# Patient Record
Sex: FEMALE | Race: OTHER | Employment: UNEMPLOYED | URBAN - METROPOLITAN AREA
[De-identification: names, ages, dates, MRNs, and addresses within clinical notes are randomized per-mention and may not be internally consistent; named-entity substitution may affect disease eponyms.]

---

## 2017-05-15 ENCOUNTER — OFFICE VISIT (OUTPATIENT)
Dept: ENDOCRINOLOGY | Age: 37
End: 2017-05-15

## 2017-05-15 VITALS
HEIGHT: 63 IN | DIASTOLIC BLOOD PRESSURE: 71 MMHG | BODY MASS INDEX: 22.39 KG/M2 | SYSTOLIC BLOOD PRESSURE: 124 MMHG | HEART RATE: 82 BPM | WEIGHT: 126.4 LBS

## 2017-05-15 DIAGNOSIS — E04.2 MULTINODULAR GOITER: ICD-10-CM

## 2017-05-15 DIAGNOSIS — E05.90 HYPERTHYROIDISM: Primary | ICD-10-CM

## 2017-05-15 RX ORDER — METOPROLOL SUCCINATE 25 MG/1
TABLET, EXTENDED RELEASE ORAL
Refills: 5 | COMMUNITY
Start: 2017-04-13 | End: 2017-08-24 | Stop reason: ALTCHOICE

## 2017-05-15 NOTE — MR AVS SNAPSHOT
Visit Information Date & Time Provider Department Dept. Phone Encounter #  
 5/15/2017  1:30 PM Mariluz Walden MD [facility] [phone] Upcoming Health Maintenance Date Due DTaP/Tdap/Td series (1 - Tdap) 8/8/2000 PAP AKA CERVICAL CYTOLOGY 8/8/2000 INFLUENZA AGE 9 TO ADULT 8/1/2017 Allergies as of 5/15/2017  Review Complete On: 5/15/2017 By: Mariluz Walden MD  
 No Known Allergies Current Immunizations  Never Reviewed No immunizations on file. Not reviewed this visit You Were Diagnosed With   
  
 Codes Comments Hyperthyroidism    -  Primary ICD-10-CM: E05.90 ICD-9-CM: 242.90 Multinodular goiter     ICD-10-CM: E04.2 ICD-9-CM: 661. 1 Vitals BP Pulse Height(growth percentile) Weight(growth percentile) BMI Smoking Status 124/71 82 5' 3\" (1.6 m) 126 lb 6.4 oz (57.3 kg) 22.39 kg/m2 Never Smoker Vitals History BMI and BSA Data Body Mass Index Body Surface Area  
 22.39 kg/m 2 1.6 m 2 Preferred Pharmacy Pharmacy Name Phone [facility] [address] [place] [address]  [facility] [place] [phone] Your Updated Medication List  
  
   
This list is accurate as of: 5/15/17  2:13 PM.  Always use your most recent med list.  
  
  
  
  
 metoprolol succinate 25 mg XL tablet Commonly known as:  TOPROL-XL  
TM 1 TA DI We Performed the Following MI COLLECTION VENOUS BLOOD,VENIPUNCTURE [id number] CPT(R)] MI HANDLG&/OR CONVEY OF SPEC FOR TR OFFICE TO LAB [65574 CPT(R)]   
 T3 TOTAL [79014 CPT(R)] T4, FREE [id number] CPT(R)] THYROID ANTIBODY PANEL [51916 CPT(R)] TSH 3RD GENERATION [19989 CPT(R)] TSH RECEPTOR AB [34261 CPT(R)] To-Do List   
 05/15/2017 Imaging:  US GUIDE FINE NDL ASP W IMAGE Patient Instructions Biopsia tiroidea con aguja gillian: Antes del procedimiento - [ Jessica Elaine Thyroid Biopsy: Before Your Procedure ] Qué es buck biopsia tiroidea por punción? Tee buck biopsia tiroidea, el médico utiliza buck aguja delgada para extraer buck pequeña muestra de tejido de la glándula tiroidea. Es posible que le estén haciendo la biopsia para determinar la causa de un bulto o crecimiento en la glándula tiroidea. La biopsia causa muy poco dolor. Rebeca glass médico puede necesitar introducirle la aguja en la glándula tiroidea más de [place]. [place] se hace para asegurarse de obtener suficiente líquido y tejido para la prueba. El médico luego examina la [place] de tejido bajo un microscopio para detectar cáncer, infección u otros problemas tiroideos. La biopsia se hace en un hospital, buck clínica o el consultorio de glass médico. Tee la prueba, usted se acostará boca arriba con buck almohada bajo los hombros. Tendrá la sosa inclinada hacia atrás y el sean extendido. Esta posición empuja la glándula tiroidea hacia tez. [place] facilita hacer la biopsia. Es posible que se le administre un medicamento para ayudarle a relajarse. Glass médico puede usar buck ecografía para guiar la colocación de la aguja. Es importante permanecer muy quieto tee la biopsia. No tosa, hable ni trague cuando la aguja esté en glass lugar. En algunos casos, la cirugía tiroidea puede ser necesaria si buck biopsia con aguja no proporciona un resultado frank. [place] se haría en un momento diferente. En esta cirugía, el médico ezequiel [place] de tejido a través de un heather (incisión) en la piel. La atención de seguimiento es buck parte clave de glass tratamiento y seguridad. Asegúrese de hacer y acudir a todas las citas, y llame a glass médico si está teniendo problemas. También es buck buena idea saber los resultados de jayleen exámenes y mantener buck lista de los medicamentos que ezequiel. Emil Poor antes del procedimiento? Los procedimientos pueden ser estresantes.  Esta información le ayudará a entender qué puede esperar. Y le ayudará a prepararse de manera corral para glass procedimiento. No necesita hacer nada antes de glass biopsia. Estará despierto tee la biopsia. Cómo prepararse para el procedimiento · Entienda exactamente qué procedimiento está planificado, junto con los [place], los beneficios y las otras [address] · Infórmeles a jayleen médicos sobre [employer], las vitaminas, los suplementos y los jesus herbarios que ezequiel. Algunos de [employer] aumentar el riesgo de sangrado o interactuar con la anestesia. · Si ezequiel medicamentos que previenen la formación de coágulos de [place], kyle warfarina (Coumadin), clopidogrel (Plavix) o aspirina, asegúrese de hablar con glass médico. Le dirá si debe dejar de ted estos medicamentos antes del procedimiento. Asegúrese de entender exactamente lo que glass médico quiere que shyanne. · Glass médico le dirá qué medicamentos ted o dejar de ted antes del procedimiento. Es posible que deba dejar de ted ciertos medicamentos buck semana o más antes del procedimiento, así que hable con glass médico tan pronto kyle pueda. · Informe a glass médico si tiene instrucciones médicas por anticipado. Estas pueden incluir un testamento vital y un poder legal permanente para la atención médica. Lleve consigo buck copia cuando vaya al hospital. Si no las tiene, sería conveniente prepararlas. Jae Chang [place], [place] médico y seres queridos sabrán jayleen deseos sobre la [facility]. Los médicos recomiendan que todas las personas preparen estos documentos antes de cualquier tipo de [place] o procedimiento. Javier Friend el día del procedimiento? · Siga en forma precisa las instrucciones sobre cuándo debe dejar de comer y beber. Si no lo hace, el procedimiento podría ser cancelado. Si glass médico le dijo que tome jayleen medicamentos el día del procedimiento, tómelos con un solo sorbo de agua. · Báñese o dúchese antes de registrarse para el procedimiento.  No use lociones, perfumes, desodorantes ni esmalte de uñas. · [place] todas las joyas y los \"piercings\". Si lleva lentes de contacto, quíteselos también. En el hospital o centro quirúrgico 
· Lleve un documento de identidad con foto. · El procedimiento durará entre 5 y 8 minutos. El regreso a glsas hogar · Es posible que tenga que pasar la noche en el hospital. 
· Asegúrese de que alguien lo lleve a glass hogar. La anestesia y los analgésicos (medicamentos para el dolor) hacen que no sea seguro que ted Antonino Cerda. · Recibirá instrucciones más específicas acerca de la recuperación del procedimiento. Cubrirán [employer] alimentación, el cuidado de las heridas, la atención de seguimiento, el manejo de vehículos y la vuelta a glass rutina normal. 

Cuándo debe llamar a glass médico? 
· Tiene preguntas o inquietudes. · No entiende cómo debe prepararse para el procedimiento. · Se enferma antes del procedimiento (por ejemplo, tiene fiebre, un resfriado o gripe). · Necesita reprogramar el procedimiento o cambió de opinión acerca de hacerse el procedimiento. Dónde puede encontrar más información en inglés? Alexi landa [email or web address]-[email or web address] Escriba H979 en la búsqueda para aprender más acerca de \"Biopsia tiroidea con aguja gillian: Antes del procedimiento - [ Fine-Needle Thyroid Biopsy: Before Your Procedure ]. \" 
Revisado: 28 julio, 2016 Versión del contenido: 11.2 © 1009-8947 [employer], [employer] Alert. Las instrucciones de cuidado fueron adaptadas bajo licencia por Good Help Connections (which disclaims liability or warranty for this information). Si usted tiene [place] afección médica o sobre estas instrucciones, siempre pregunte a glass profesional de jdaen. [facility] niega toda garantía o responsabilidad por glass uso de esta información. Hipertiroidismo: Instrucciones de cuidado - [ Hyperthyroidism: Care Instructions ] Instrucciones de cuidado El hipertiroidismo ocurre cuando la glándula tiroides produce demasiada hormona tiroidea. Paton acelera glass metabolismo, o sea, la [employer] glass cuerpo [place] la energía. Esta afección puede hacer que usted esté muy [place], baje de [place], y que tenga problemas de [facility], [facility] ojos y [facility] frecuencia cardíaca rápida. También puede causar bocio. El bocio es el agrandamiento de la glándula tiroides que puede verse en la parte delantera del sean. La enfermedad de [place] suele causar hipertiroidismo. En la enfermedad de [place], el sistema de defensa (inmunitario) del cuerpo ataca la glándula tiroides. Glass médico podría recetarle un medicamento betabloqueante para hacer glass pulso más lento y calmarle. Rebeca esto no es un tratamiento para el hipertiroidismo. Se le administra para la frecuencia cardíaca rápida. Glass médico podría también darle un medicamento antitiroideo. Lala medicamento controla el exceso de la hormona tiroidea. En algunos casos, los médicos recomiendan yodo radioactivo o [place] para extraer la tiroides. Después de cualquiera de esos tratamientos, es posible que necesite ted medicamentos para reemplazar glass hormona tiroidea tee el cori de glass davey. La atención de seguimiento es buck parte clave de glass tratamiento y seguridad. Asegúrese de hacer y acudir a todas las citas, y llame a glass médico si está teniendo problemas. También es buck buena idea saber los resultados de los exámenes y mantener buck lista de los medicamentos que ezequiel. Cómo puede cuidarse en el hogar? · Ocotillo jayleen medicamentos exactamente kyle le fueron recetados. Necesita ted [employer] tiroideo a la misma hora todos los días. Llame a glass médico si danny estar teniendo problemas con glass medicamento. · La enfermedad de [place] puede causar dolor en los ojos. Use lágrimas artificiales, gotas para los ojos y anteojos de sol para proteger jayleen ojos de la resequedad, el viento y el sol.  Por la noche, levante glass sosa con almohadas para prevenir que jayleen ojos se hinchen. En algunos casos, tapar jayleen párpados con cinta adhesiva tee la noche evitará que jayleen ojos estén resecos a la mañana. · Asegúrese de consumir suficiente calcio. Los alimentos ricos en calcio incluyen la [place], el yogur, el queso y los vegetales de hojas de color august oscuro. · Si usted necesita subir de [place], pregúntele a glass médico sobre dietas especiales. · No coma algas. Low [place] son ricas en yodo, lo que puede empeorar el hipertiroidismo. Las algas se usan comúnmente en el sushi y otras comidas [place]. Usted puede usar sal yodada y comer san y [address]. Trate de seguir buck dieta balanceada. · No use cafeína ni otros estimulantes. Éstos pueden empeorar jayleen síntomas, kyle los latidos del corazón rápidos, el nerviosismo y los problemas para concentrarse. · No fume. Fumar puede empeorar glass afección y podría causar problemas más graves en los ojos. Si necesita ayuda para dejar de fumar, hable con glass médico [employer] y medicamentos para dejar de fumar. Éstos pueden aumentar jayleen probabilidades de dejar el hábito para siempre. · Reduzca el estrés. Aprenda a usar la biorretroalimentación, la imaginería guiada, la meditación u otros métodos de relajación. · Use cremas o pomadas para la piel irritada. Pregúntele a glass médico qué tipo debe usar. · Infórmele a todos jayleen médicos acerca de glass afección médica. Necesitan saberlo porque algunos medicamentos contienen yodo. Cuándo debe pedir ayuda? Llame a glass médico ahora mismo o busque atención médica inmediata si: · Tiene síntomas de un nivel de tiroides [place] alto y repentino (tormenta tiroidea o crisis tirotóxica). Éstos incluyen: 
[place], vómito y Radha Jatin. ¨ Sudoración excesiva. ¨ Inquietud y confusión extremas. ¨ Fiebre juan. ¨ Pulso (latidos del corazón) acelerado. · Tiene cambios repentinos en la visión o dolor en los ojos.  
· Tiene fiebre o mucho dolor de garganta, y ezequiel medicamentos antitiroideos, kyle PTU o metimazol. Preste especial atención a los cambios en glass jaden y asegúrese de comunicarse con glass médico si: · Tiene dolor de garganta o problemas para tragar. · Tiene los ojos hinchados, enrojecidos o con comezón, o jayleen otros síntomas en los ojos empeoran o tiene nuevos problemas en la visión. · Tiene señales de un nivel tiroideo bajo (hipotiroidismo). Es posible que se sienta muy cansado, confuso o débil. Dónde puede encontrar más información en inglés? Eusebio Rife a [email or web address]-[email or web address] Yarely Fabián X356 en la búsqueda para aprender más acerca de \"Hipertiroidismo: Instrucciones de cuidado - [ Hyperthyroidism: Care Instructions ]. \" 
Revisado: 28 julio, 2016 Versión del contenido: 11.2 © 4556-4373 Healthwise, Incorporated. Las instrucciones de cuidado fueron adaptadas bajo licencia por Good Help Connections (which disclaims liability or warranty for this information). Si usted tiene [place] afección médica o sobre estas instrucciones, siempre pregunte a glass profesional de jaden. Healthwise, Incorporated niega toda garantía o responsabilidad por glass uso de esta información. Introducing [facility]! Yesenia Mendozaer introduces [facility] patient portal. Now you can access parts of your medical record, email your doctor's office, and request medication refills online. 1. In your internet browser, go to [email or web address] 2. Click on the First Time User? Click Here link in the Sign In box. You will see the New Member Sign Up page. 3. Enter your [facility] Access Code exactly as it appears below. You will not need to use this code after youve completed the sign-up process. If you do not sign up before the expiration date, you must request a new code. · [facility] Access Code: [email or web address] Expires: 8/13/2017  2:13 PM 
 
4.  Enter the last four digits of your Social Security Number (xxxx) and Date of Birth (mm/dd/yyyy) as indicated and click Submit. You will be taken to the next sign-up page. 5. Create a [facility] ID. This will be your [facility] login ID and cannot be changed, so think of one that is secure and easy to remember. 6. Create a [facility] password. You can change your password at any time. 7. Enter your Password Reset Question and Answer. This can be used at a later time if you forget your password. 8. Enter your e-mail address. You will receive e-mail notification when new information is available in [address] 9. Click Sign Up. You can now view and download portions of your medical record. 10. Click the Download Summary menu link to download a portable copy of your medical information. If you have questions, please visit the Frequently Asked Questions section of the [facility] website. Remember, [facility] is NOT to be used for urgent needs. For medical emergencies, dial 911. Now available from your iPhone and Android! Please provide this summary of care documentation to your next provider. Your primary care clinician is listed as Ester Samayoa. If you have any questions after today's visit, please call [phone].

## 2017-05-15 NOTE — PATIENT INSTRUCTIONS
Biopsia tiroidea con aguja gillian: Antes del procedimiento - [ Fine-Needle Thyroid Biopsy: Before Your Procedure ]  ¿Qué es buck biopsia tiroidea por punción? Tee buck biopsia tiroidea, el médico utiliza buck aguja delgada para extraer buck pequeña muestra de tejido de la glándula tiroidea. Es posible que le estén haciendo la biopsia para determinar la causa de un bulto o crecimiento en la glándula tiroidea. La biopsia causa muy poco dolor. Rebeca glass médico puede necesitar introducirle la aguja en la glándula tiroidea más de [place]. [place] se hace para asegurarse de obtener suficiente líquido y tejido para la prueba. El médico luego examina la [place] de tejido bajo un microscopio para detectar cáncer, infección u otros problemas tiroideos. La biopsia se hace en un hospital, buck clínica o el consultorio de glass médico. Tee la prueba, usted se acostará boca arriba con buck almohada bajo los hombros. Tendrá la sosa inclinada hacia atrás y el sean extendido. Esta posición empuja la glándula tiroidea hacia tez. [place] facilita hacer la biopsia. Es posible que se le administre un medicamento para ayudarle a relajarse. Glass médico puede usar buck ecografía para guiar la colocación de la aguja. Es importante permanecer muy quieto tee la biopsia. No tosa, hable ni trague cuando la aguja esté en glass lugar. En algunos casos, la cirugía tiroidea puede ser necesaria si buck biopsia con aguja no proporciona un resultado frank. [place] se haría en un momento diferente. En esta cirugía, el médico ezequiel [place] de tejido a través de un heather (incisión) en la piel. La atención de seguimiento es buck parte clave de glass tratamiento y seguridad. Asegúrese de hacer y acudir a todas las citas, y llame a glass médico si está teniendo problemas. También es buck buena idea saber los resultados de jayleen exámenes y mantener buck lista de los medicamentos que ezequiel. ¿Qué ocurre antes del procedimiento?   Los procedimientos pueden ser estresantes. Esta información le ayudará a entender qué puede esperar. Y le ayudará a prepararse de manera corral para glass procedimiento. No necesita hacer nada antes de glass biopsia. Estará despierto tee la biopsia. Cómo prepararse para el procedimiento  · [place] exactamente qué procedimiento está planificado, junto con los [place], los beneficios y las otras alternativas. · Infórmeles a jayleen médicos sobre [employer], las vitaminas, los suplementos y los jesus herbarios que ezequiel. Algunos de [employer] aumentar el riesgo de sangrado o interactuar con la anestesia. · Si ezequiel medicamentos que previenen la formación de coágulos de [place], kyle warfarina (Coumadin), clopidogrel (Plavix) o aspirina, asegúrese de hablar con glass médico. Le dirá si debe dejar de ted estos medicamentos antes del procedimiento. Asegúrese de entender exactamente lo que glass médico quiere que shyanne. · Glass médico le dirá qué medicamentos ted o dejar de ted antes del procedimiento. Es posible que deba dejar de ted ciertos medicamentos buck semana o más antes del procedimiento, así que hable con glass médico tan pronto kyle pueda. · Informe a glass médico si tiene instrucciones médicas por anticipado. Estas pueden incluir un testamento vital y un poder legal permanente para la atención médica. Lleve consigo buck copia cuando vaya al hospital. Si no las tiene, sería conveniente prepararlas. Shama Choi [place], [place] médico y seres queridos sabrán jayleen deseos sobre la [facility]. Los médicos recomiendan que todas las personas preparen estos documentos antes de cualquier tipo de [place] o procedimiento. ¿Qué ocurre el día del procedimiento? · Siga en forma precisa las instrucciones sobre cuándo debe dejar de comer y beber. Si no lo hace, el procedimiento podría ser cancelado. Si glass médico le dijo que tome jayleen medicamentos el día del procedimiento, tómelos con un solo sorbo de agua.   · Báñese o dúchese antes de registrarse para el procedimiento. No use lociones, perfumes, desodorantes ni esmalte de uñas. · [place] todas las joyas y los \"piercings\". Si lleva lentes de contacto, quíteselos también. En el hospital o centro quirúrgico  · Lleve un documento de identidad con foto. · El procedimiento durará entre 5 y 8 minutos. El regreso a glass hogar  · Es posible que tenga que pasar la noche en el hospital.  · Asegúrese de que alguien lo lleve a glass hogar. La anestesia y los analgésicos (medicamentos para el dolor) hacen que no sea seguro que ted Antonino Cerda. · Recibirá instrucciones más específicas acerca de la recuperación del procedimiento. [place] [employer] alimentación, el cuidado de las heridas, la atención de seguimiento, el manejo de vehículos y la vuelta a glass rutina normal.  ¿Cuándo debe llamar a glass médico?  · Tiene preguntas o inquietudes. · No entiende cómo debe prepararse para el procedimiento. · Se enferma antes del procedimiento (por ejemplo, tiene fiebre, un resfriado o gripe). · Necesita reprogramar el procedimiento o cambió de opinión acerca de hacerse el procedimiento. ¿Dónde puede encontrar más información en inglés? Joe Duncan a [email or web address] Escriba X790 en la búsqueda para aprender más acerca de \"Biopsia tiroidea con aguja gillian: Antes del procedimiento - [ Fine-Needle Thyroid Biopsy: Before Your Procedure ]. \"  Revisado: 28 julio, 2016  Versión del contenido: 11.2  © 0518-1520 [employer]. Las instrucciones de cuidado fueron adaptadas bajo licencia por [employer] (which disclaims liability or warranty for this information). Si usted tiene [place] afección médica o sobre estas instrucciones, siempre pregunte a glass profesional de jaden. [facility] niega toda garantía o responsabilidad por glass uso de esta información.        Hipertiroidismo: Instrucciones de cuidado - [ Hyperthyroidism: Care Instructions ]  Instrucciones de [address] hipertiroidismo ocurre cuando la glándula tiroides produce demasiada hormona tiroidea. [place] acelera glass metabolismo, o sea, la [employer] glass cuerpo [place] la energía. Esta afección puede hacer que usted esté muy [place], baje de [place], y que tenga problemas de sueño, [employer] ojos y [place] frecuencia cardíaca rápida. También puede causar bocio. El bocio es el agrandamiento de la glándula tiroides que puede verse en la parte delantera del sean. La enfermedad de [place] suele causar hipertiroidismo. En la enfermedad de [place], el sistema de defensa (inmunitario) del cuerpo ataca la glándula tiroides. Glass médico podría recetarle un medicamento betabloqueante para hacer glass pulso más lento y calmarle. Rebeca esto no es un tratamiento para el hipertiroidismo. Se le administra para la frecuencia cardíaca rápida. Glass médico podría también darle un medicamento antitiroideo. Lala medicamento controla el exceso de la hormona tiroidea. En algunos casos, los médicos recomiendan yodo radioactivo o [place] para extraer la tiroides. Después de cualquiera de esos tratamientos, es posible que necesite ted medicamentos para reemplazar glass hormona tiroidea tee el cori de glass davey. La atención de seguimiento es buck parte clave de glass tratamiento y seguridad. Asegúrese de hacer y acudir a todas las citas, y llame a glass médico si está teniendo problemas. También es buck buena idea saber los resultados de los exámenes y mantener buck lista de los medicamentos que ezequiel. ¿Cómo puede cuidarse en el hogar? · [place] jayleen medicamentos exactamente kyle le fueron recetados. Necesita ted [employer] tiroideo a la misma hora todos los días. Llame a glass médico si danny estar teniendo problemas con glass medicamento. · La enfermedad de [place] puede causar dolor en los ojos. Use lágrimas artificiales, gotas para los ojos y anteojos de sol para proteger jayleen ojos de la resequedad, el viento y el sol.  Por la noche, levante glass sosa con almohadas para prevenir que jayleen ojos se hinchen. En algunos casos, tapar jayleen párpados con cinta adhesiva tee la noche evitará que jayleen ojos estén resecos a la mañana. · Asegúrese de consumir suficiente calcio. Los alimentos ricos en calcio incluyen la [place], el yogur, el queso y los vegetales de hojas de color august oscuro. · Si usted necesita subir de [place], pregúntele a glass médico sobre dietas especiales. · No coma algas. [place] son ricas en yodo, lo que puede empeorar el hipertiroidismo. Las algas se usan comúnmente en el sushi y otras comidas [place]. Usted puede usar sal yodada y comer pan y [place]. Trate de seguir buck dieta balanceada. · No use cafeína ni otros estimulantes. Éstos pueden empeorar jayleen síntomas, kyle los latidos del corazón rápidos, el nerviosismo y los problemas para concentrarse. · No fume. Fumar puede empeorar glass afección y podría causar problemas más graves en los ojos. Si necesita ayuda para dejar de fumar, hable con glass médico [employer] y medicamentos para dejar de fumar. Éstos pueden aumentar jayleen probabilidades de dejar el hábito para siempre. · Reduzca el estrés. Aprenda a usar la biorretroalimentación, la imaginería guiada, la meditación u otros métodos de relajación. · Use cremas o pomadas para la piel irritada. Pregúntele a glass médico qué tipo debe usar. · Infórmele a todos jayleen médicos acerca de glass afección médica. Necesitan saberlo porque algunos medicamentos contienen yodo. ¿Cuándo debe pedir ayuda? Llame a glass médico ahora mismo o busque atención médica inmediata si:  · Tiene síntomas de un nivel de tiroides [place] alto y repentino (tormenta tiroidea o crisis tirotóxica). Éstos incluyen:  [employer], vómito y [place]. ¨ Sudoración excesiva. ¨ Inquietud y confusión extremas. ¨ Fiebre juan. ¨ Pulso (latidos del corazón) acelerado. · Tiene cambios repentinos en la visión o dolor en los ojos.   · Tiene fiebre o mucho dolor de garganta, y ezequiel medicamentos antitiroideos, kyle PTU o metimazol. Preste especial atención a los cambios en glass jaden y asegúrese de comunicarse con glass médico si:  · Tiene dolor de garganta o problemas para tragar. · Tiene los ojos hinchados, enrojecidos o con comezón, o jayleen otros síntomas en los ojos empeoran o tiene nuevos problemas en la visión. · Tiene señales de un nivel tiroideo bajo (hipotiroidismo). Es posible que se sienta muy cansado, confuso o débil. ¿Dónde puede encontrar más información en inglés? Melita Sharma a [email or web address] Suzanna Farley V698 en la búsqueda para aprender más acerca de \"Hipertiroidismo: Instrucciones de cuidado - [ Hyperthyroidism: Care Instructions ]. \"  Revisado: 28 julio, 2016  Versión del contenido: 11.2  © 3999-0275 [employer]wise, Incorporated. Las instrucciones de cuidado fueron adaptadas bajo licencia por [employer] (which disclaims liability or warranty for this information). Si usted tiene [place] afección médica o sobre estas instrucciones, siempre pregunte a glass profesional de jaden. [employer]wise, Incorporated niega toda garantía o responsabilidad por glass uso de esta información.

## 2017-05-15 NOTE — PROGRESS NOTES
Chief Complaint   Patient presents with    Thyroid Problem     pcp and pharmacy verified   Records from PCP reviewed  History of Present Illness: Lindsey Fox is a 39 y.o. female, who I was asked to see in consult by Dr. Jennifer Alicea for evaluation of hyperthyroidism and MNG. Pt notes that in 2015 in [facility] she was diagnosed with \"a thyroid issue\" she had a biopsy but was never treated for her \"thyroid problems\". She was having issues of fatigue, tired all the time, low mood, GI upset and hair loss. \"I had blood tests and was told I had a thyroid issue\". She had an ultrasound and biopsy, but there was no evidence of cancer. She brought the thyroid FNA report from [facility], the Left thyroid nodule was read as \"comatible with nodular Lymphocytic Thyroidits\"  The Right thyroid was \"unsatisfactory sample\". She notes that she when she was initially diagnosed she was having some dysphagia, but that has resolved. She is still having the issues of hair loss, and fatigue, she notes that \"I feel dizzy and unbalanced\", she also notes palpitatons and \"my eyes are bothering me\". She notes the dizziness will come and go, she notes it occurs every couple of weeks and then will resolve and then reoccur. She also notes issues of HAs. Prior to 2015 she never had previous thyroid issues. She has cousins on her mother's side with thyroid issues. No personal history of cancer, she thinks that two people on her father's side had cancer, stomach and \"an unknown cancer\". Pt was also sent for Thyroid US in April 2017 at [facility], the report shows a right thyroid nodule 9x5x8 mm and Left hypoechoic nodule measuring 1.9x1. 1x1.7 cm with internal septation. Will request images of thyroid US from [facility].     Pt only speaks Korean, the history was taken with the help of Tony Gonzales #[id number]    Past Medical History:   Diagnosis Date    Hypertension     Palpitations     Thyroid nodule History reviewed. No pertinent surgical history. Current Outpatient Prescriptions   Medication Sig    metoprolol succinate (TOPROL-XL) 25 mg XL tablet TM 1 TA DI     No current facility-administered medications for this visit. No Known Allergies  Family History   Problem Relation Age of Onset    Kidney Disease Mother      MARCUS    Hypertension Father     Diabetes Father     Kidney Disease Maternal Grandmother     Hypertension Maternal Grandmother     Cancer Maternal Grandfather      sto,acj    Hypertension Maternal Grandfather     No Known Problems Paternal Grandmother     No Known Problems Paternal Grandfather     No Known Problems Sister     No Known Problems Brother     Thyroid Disease Cousin      x4    Thyroid Cancer Neg Hx      Social History     Social History    Marital status: SINGLE     Spouse name: N/A    Number of children: N/A    Years of education: N/A     Occupational History    Not on file. Social History Main Topics    Smoking status: Never Smoker    Smokeless tobacco: Never Used    Alcohol use No    Drug use: No    Sexual activity: Not on file     Other Topics Concern    Not on file     Social History Narrative    No narrative on file     Review of Systems:  - Constitutional Symptoms: no fevers, chills, weight loss  - Eyes: + blurry vision  - Cardiovascular: no chest pain or palpitations  - Respiratory: no cough or shortness of breath  - Gastrointestinal: no dysphagia or abdominal pain  - Musculoskeletal: + weakness  - Integumentary: no rashes  - Neurological: no numbness, tingling, or headaches  - Psychiatric: + depression  - Endocrine: no heat or cold intolerance, no polyuria or polydipsia    Physical Examination:  Blood pressure 124/71, pulse 82, height 5' 3\" (1.6 m), weight 126 lb 6.4 oz (57.3 kg).   - General: pleasant, no distress, good eye contact  - HEENT: no exopthalmos, no periorbital edema, no scleral/conjunctival injection, EOMI, no lid lag or stare  - Neck: supple, no thyromegaly, masses, lymph nodes, or carotid bruits, no supraclavicular or dorsocervical fat pads  - Cardiovascular: regular, normal rate, normal S1 and S2, no murmurs/rubs/gallops   - Respiratory: clear to auscultation bilaterally  - Gastrointestinal: soft, nontender, nondistended, no masses, no hepatosplenomegaly  - Musculoskeletal: no proximal muscle weakness in upper or lower extremities  - Integumentary: no acanthosis nigricans, no abdominal striae, no rashes, no edema  - Neurological: reflexes 2+ at biceps, no tremor  - Psychiatric: normal mood and affect    Data Reviewed:   - TSH 0.382 December 2016  - thyroid ultrasound report (April 2017)  Right nodule 9.4x5. 3x8.1mm  Left lower nodule 1.9x1. 1x1.7cm hypoechoic with internal septation. Assessment/Plan:   1. Hyperthyroidism    2. Multinodular goiter    1) Pt has symptoms comptible with hypothyroidism, but she had low TSH in December 2016 and is on daily Metoprolol. Will check TSH, FT4, TT3, Trab, and Thyroid Ab panel to evaluate her thyroid function. She has no exophthalmos, thyromegaly, or lid lag. The FNA from 2015 suggested \"Lymphocytic Thyroiditis\". Pt was told her platelet count has been high since she was diagnosed with thyroid issues. I recommended she follow up with her PCP for further evaluation of her blood counts. 2) Will order a thyroid FNA of the left dominate thyroid nodule and the right thyroid nodule    Pt voices understanding and agreement with the plan. RTC 3 months      Patient Instructions        Biopsia tiroidea con aguja gillian: Antes del procedimiento - [ Fine-Needle Thyroid Biopsy: Before Your Procedure ]  ¿Qué es buck biopsia tiroidea por punción? Keira buck biopsia tiroidea, el médico utiliza buck aguja delgada para extraer buck pequeña muestra de tejido de la glándula tiroidea.  Es posible que le estén haciendo la biopsia para determinar la causa de un bulto o crecimiento en la glándula tiroidea. La biopsia causa muy poco dolor. Rebeca glass médico puede necesitar introducirle la aguja en la glándula tiroidea más de [place]. Carencro se hace para asegurarse de obtener suficiente líquido y tejido para la prueba. El médico luego examina la Juanjo de tejido bajo un microscopio para detectar cáncer, infección u otros problemas tiroideos. La biopsia se hace en un hospital, buck clínica o el consultorio de glass médico. Tee la prueba, usted se acostará boca arriba con buck almohada bajo los hombros. Tendrá la sosa inclinada hacia atrás y el sean extendido. Esta posición empuja la glándula tiroidea hacia tez. Carencro facilita hacer la biopsia. Es posible que se le administre un medicamento para ayudarle a relajarse. Glsas médico puede usar buck ecografía para guiar la colocación de la aguja. Es importante permanecer muy quieto tee la biopsia. No tosa, hable ni trague cuando la aguja esté en glass lugar. En algunos casos, la cirugía tiroidea puede ser necesaria si buck biopsia con aguja no proporciona un resultado frank. Carencro se haría en un momento diferente. En esta cirugía, el médico ezequiel [place] de tejido a través de un heather (incisión) en la piel. La atención de seguimiento es buck parte clave de glass tratamiento y seguridad. Asegúrese de hacer y acudir a todas las citas, y llame a glass médico si está teniendo problemas. También es buck buena idea saber los resultados de jayleen exámenes y mantener buck lista de los medicamentos que ezequiel. ¿Qué ocurre antes del procedimiento? Los procedimientos pueden ser estresantes. Esta información le ayudará a entender qué puede esperar. Y le ayudará a prepararse de manera corral para glass procedimiento. No necesita hacer nada antes de galss biopsia. Estará despierto tee la biopsia. Cómo prepararse para el procedimiento  · [place] exactamente qué procedimiento está planificado, junto con los [place], los beneficios y las otras alternativas.   · Infórmeles a jayleen médicos sobre [employer], las vitaminas, los suplementos y los jesus herbarios que ezequiel. Algunos de [employer] aumentar el riesgo de sangrado o interactuar con la anestesia. · Si ezequiel medicamentos que previenen la formación de coágulos de [place], kyle warfarina (Coumadin), clopidogrel (Plavix) o aspirina, asegúrese de hablar con glass médico. Le dirá si debe dejar de ted estos medicamentos antes del procedimiento. Asegúrese de entender exactamente lo que glass médico quiere que shyanne. · Glass médico le dirá qué medicamentos ted o dejar de ted antes del procedimiento. Es posible que deba dejar de ted ciertos medicamentos buck semana o más antes del procedimiento, así que hable con glass médico tan pronto kyle pueda. · Informe a glass médico si tiene instrucciones médicas por anticipado. Estas pueden incluir un testamento vital y un poder legal permanente para la atención médica. Lleve consigo buck copia cuando vaya al hospital. Si no las tiene, sería conveniente prepararlas. Nestor Strauss [place], [place] médico y seres queridos sabrán jayleen deseos sobre la 0 [facility]. Los médicos recomiendan que todas las personas preparen estos documentos antes de cualquier tipo de [place] o procedimiento. ¿Qué ocurre el día del procedimiento? · Siga en forma precisa las instrucciones sobre cuándo debe dejar de comer y beber. Si no lo hace, el procedimiento podría ser cancelado. Si glass médico le dijo que tome jayleen medicamentos el día del procedimiento, tómelos con un solo sorbo de agua. · Báñese o dúchese antes de registrarse para el procedimiento. No use lociones, perfumes, desodorantes ni esmalte de uñas. · [place] todas las joyas y los \"piercings\". Si lleva lentes de contacto, quíteselos también. En el hospital o centro quirúrgico  · Lleve un documento de identidad con foto. · El procedimiento durará entre 5 y 8 minutos.   El regreso a glass hogar  · Es posible que tenga que pasar la noche en el hospital.  · Asegúrese de que alguien lo lleve a glass hogar. La anestesia y los analgésicos (medicamentos para el dolor) hacen que no sea seguro que usted Antoinno Zaida. · Recibirá instrucciones más específicas acerca de la recuperación del procedimiento. [employer] alimentación, el cuidado de las heridas, la atención de seguimiento, el manejo de vehículos y la vuelta a glass rutina normal.  ¿Cuándo debe llamar a glass médico?  · Tiene preguntas o inquietudes. · No entiende cómo debe prepararse para el procedimiento. · Se enferma antes del procedimiento (por ejemplo, tiene fiebre, un resfriado o gripe). · Necesita reprogramar el procedimiento o cambió de opinión acerca de hacerse el procedimiento. ¿Dónde puede encontrar más información en inglés? Joe Duncan a http://jareth-femi.info/. Escriba E005 en la búsqueda para aprender más acerca de \"Biopsia tiroidea con aguja gillian: Antes del procedimiento - [ Fine-Needle Thyroid Biopsy: Before Your Procedure ]. \"  Revisado: 28 julio, 2016  Versión del contenido: 11.2  © 3741-8367 Healthwise, Incorporated. Las instrucciones de cuidado fueron adaptadas bajo licencia por [employer] (which disclaims liability or warranty for this information). Si usted tiene [facility] afección médica o sobre estas instrucciones, siempre pregunte a glass profesional de jaden. Healthwise, Incorporated niega toda garantía o responsabilidad por glass uso de esta información. Hipertiroidismo: Instrucciones de cuidado - [ Hyperthyroidism: Care Instructions ]  Instrucciones de cuidado  El hipertiroidismo ocurre cuando la glándula tiroides produce demasiada hormona tiroidea. [place] acelera glass metabolismo, o sea, la [employer] glass cuerpo [place] la energía. Esta afección puede hacer que usted esté muy [place], baje de [place], y que tenga problemas de sueño, [employer] ojos y [place] Eth frecuencia cardíaca rápida. También puede causar bocio.  El bocio es el agrandamiento de la glándula tiroides que puede verse en la parte delantera del sean. La enfermedad de [place] suele causar hipertiroidismo. En la enfermedad de [place], el sistema de defensa (inmunitario) del cuerpo ataca la glándula tiroides. Glass médico podría recetarle un medicamento betabloqueante para hacer glass pulso más lento y calmarle. Rebeca esto no es un tratamiento para el hipertiroidismo. Se le administra para la frecuencia cardíaca rápida. Glass médico podría también darle un medicamento antitiroideo. Lala medicamento controla el exceso de la hormona tiroidea. En algunos casos, los médicos recomiendan yodo radioactivo o [place] para extraer la tiroides. Después de cualquiera de esos tratamientos, es posible que necesite ted medicamentos para reemplazar glass hormona tiroidea tee el cori de glass davey. La atención de seguimiento es buck parte clave de glass tratamiento y seguridad. Asegúrese de hacer y acudir a todas las citas, y llame a glass médico si está teniendo problemas. También es buck buena idea saber los resultados de los exámenes y mantener buck lista de los medicamentos que ezequiel. ¿Cómo puede cuidarse en el hogar? · Ricardo jayleen medicamentos exactamente kyle le fueron recetados. Necesita ted [employer] tiroideo a la misma hora todos los días. Llame a glass médico si danny estar teniendo problemas con glass medicamento. · La enfermedad de [place] puede causar dolor en los ojos. Use lágrimas artificiales, gotas para los ojos y anteojos de sol para proteger jayleen ojos de la resequedad, el viento y el sol. Por la noche, levante glass sosa con almohadas para prevenir que jayleen ojos se hinchen. En algunos casos, tapar jayleen párpados con cinta adhesiva tee la noche evitará que jayleen ojos estén resecos a la mañana. · Asegúrese de consumir suficiente calcio. Los alimentos ricos en calcio incluyen la [place], el yogur, el queso y los vegetales de hojas de color august oscuro. · Si usted necesita subir de [place], pregúntele a glass médico sobre dietas especiales. · No coma algas.  Girish Montoya algas son ricas en yodo, lo que puede empeorar el hipertiroidismo. Las algas se usan comúnmente en el sushi y otras comidas [place]. Usted puede usar sal yodada y comer pan y [place]. Trate de seguir buck dieta balanceada. · No use cafeína ni otros estimulantes. Éstos pueden empeorar jayleen síntomas, kyle los latidos del corazón rápidos, el nerviosismo y los problemas para concentrarse. · No fume. Fumar puede empeorar glass afección y podría causar problemas más graves en los ojos. Si necesita ayuda para dejar de fumar, hable con glass médico [employer] y medicamentos para dejar de fumar. Éstos pueden aumentar jayleen probabilidades de dejar el hábito para siempre. · Reduzca el estrés. Aprenda a usar la biorretroalimentación, la imaginería guiada, la meditación u otros métodos de relajación. · Use cremas o pomadas para la piel irritada. Pregúntele a glass médico qué tipo debe usar. · Infórmele a todos jayleen médicos acerca de glass afección médica. Necesitan saberlo porque algunos medicamentos contienen yodo. ¿Cuándo debe pedir ayuda? Llame a glass médico ahora mismo o busque atención médica inmediata si:  · Tiene síntomas de un nivel de tiroides [place] alto y repentino (tormenta tiroidea o crisis tirotóxica). Éstos incluyen:  Og [place], vómito y Radha Jatin. ¨ Sudoración excesiva. ¨ Inquietud y confusión extremas. ¨ Fiebre juan. ¨ Pulso (latidos del corazón) acelerado. · Tiene cambios repentinos en la visión o dolor en los ojos. · Tiene fiebre o mucho dolor de garganta, y ezequiel medicamentos antitiroideos, kyle PTU o metimazol. Preste especial atención a los cambios en glass jaden y asegúrese de comunicarse con glass médico si:  · Tiene dolor de garganta o problemas para tragar. · Tiene los ojos hinchados, enrojecidos o con comezón, o jayleen otros síntomas en los ojos empeoran o tiene nuevos problemas en la visión. · Tiene señales de un nivel tiroideo bajo (hipotiroidismo).  Es posible que se sienta muy cansado, confuso o débil.  ¿Dónde puede encontrar más información en inglés? Juani Strauss a http://jareth-femi.info/. Herring Speaks C694 en la búsqueda para aprender más acerca de \"Hipertiroidismo: Instrucciones de cuidado - [ Hyperthyroidism: Care Instructions ]. \"  Revisado: 28 julio, 2016  Versión del contenido: 11.2  © 2606-5310 Healthwise, Incorporated. Las instrucciones de cuidado fueron adaptadas bajo licencia por Good [employer] (which disclaims liability or warranty for this information). Si usted tiene [place] afección médica o sobre estas instrucciones, siempre pregunte a glass profesional de jaden. Healthwise, Incorporated niega toda garantía o responsabilidad por glass uso de esta información. Follow-up Disposition:  Return in about 3 months (around 8/15/2017).     Copy sent to:  Dr. Vianney Soria

## 2017-05-16 LAB
T3 SERPL-MCNC: 129 NG/DL (ref 71–180)
T4 FREE SERPL-MCNC: 1.18 NG/DL (ref 0.82–1.77)
THYROGLOB AB SERPL-ACNC: <1 IU/ML (ref 0–0.9)
THYROPEROXIDASE AB SERPL-ACNC: 12 IU/ML (ref 0–34)
TSH RECEP AB SER-ACNC: 0.71 IU/L (ref 0–1.75)
TSH SERPL DL<=0.005 MIU/L-ACNC: 0.4 UIU/ML (ref 0.45–4.5)

## 2017-05-22 ENCOUNTER — TELEPHONE (OUTPATIENT)
Dept: ENDOCRINOLOGY | Age: 37
End: 2017-05-22

## 2017-05-22 NOTE — TELEPHONE ENCOUNTER
Using  #[id number], Ms Molina Shanta, I attempted to call Ms Cassandra Christy, but there was no answer and we left a message on her voice mail.

## 2017-05-23 LAB
THYROGLOB AB SERPL-ACNC: <1 IU/ML (ref 0–0.9)
THYROPEROXIDASE AB SERPL-ACNC: 7 IU/ML (ref 0–34)

## 2017-05-31 ENCOUNTER — HOSPITAL ENCOUNTER (OUTPATIENT)
Dept: ULTRASOUND IMAGING | Age: 37
Discharge: HOME | End: 2017-05-31
Attending: INTERNAL MEDICINE
Payer: MEDICAID

## 2017-05-31 DIAGNOSIS — E04.2 MULTINODULAR GOITER: ICD-10-CM

## 2017-05-31 PROCEDURE — 88173 CYTOPATH EVAL FNA REPORT: CPT | Performed by: INTERNAL MEDICINE

## 2017-05-31 PROCEDURE — 74011000250 HC RX REV CODE- 250: Performed by: RADIOLOGY

## 2017-05-31 PROCEDURE — 88172 CYTP DX EVAL FNA 1ST EA SITE: CPT | Performed by: INTERNAL MEDICINE

## 2017-05-31 PROCEDURE — 10022 US GUIDE FINE NDL ASP W IMAGE: CPT

## 2017-05-31 RX ORDER — LIDOCAINE HYDROCHLORIDE 10 MG/ML
10 INJECTION INFILTRATION; PERINEURAL
Status: ACTIVE | OUTPATIENT
Start: 2017-05-31 | End: 2017-06-01

## 2017-05-31 RX ADMIN — SODIUM BICARBONATE 1 ML: 0.2 INJECTION, SOLUTION INTRAVENOUS at 12:39

## 2017-06-02 ENCOUNTER — TELEPHONE (OUTPATIENT)
Dept: ENDOCRINOLOGY | Age: 37
End: 2017-06-02

## 2017-06-02 NOTE — TELEPHONE ENCOUNTER
Attempted to call pt to discuss lab and biopsy results, using the  services of Kali Rogers ([id number]). There was no answer and he left a message requesting a call back. Since there have been multiple attempts to contact her I will send a letter with the results of the labs and biopsy.

## 2017-08-03 ENCOUNTER — OFFICE VISIT (OUTPATIENT)
Dept: NEUROLOGY | Age: 37
End: 2017-08-03

## 2017-08-03 VITALS
DIASTOLIC BLOOD PRESSURE: 80 MMHG | RESPIRATION RATE: 16 BRPM | HEART RATE: 65 BPM | SYSTOLIC BLOOD PRESSURE: 124 MMHG | OXYGEN SATURATION: 99 %

## 2017-08-03 DIAGNOSIS — R55 NEAR SYNCOPE: ICD-10-CM

## 2017-08-03 DIAGNOSIS — R42 VERTIGO: Primary | ICD-10-CM

## 2017-08-03 RX ORDER — MECLIZINE HYDROCHLORIDE 25 MG/1
TABLET ORAL
COMMUNITY
End: 2017-08-31

## 2017-08-03 NOTE — LETTER
8/3/2017 Patient:  Katelynn Corbett YOB: 1980 Date of Visit: 8/3/2017 Dear Debbie Vang MD 
[address] [place] [id number] VIA Facsimile: [phone] 
 : I was requested by Debbie Vang MD to evaluate Ms. Katelynn Corbett  for Chief Complaint Patient presents with  Dizziness Arbutus Ring I am recommending the following:  
 
Diagnoses and all orders for this visit: 1. Vertigo -     MRI BRAIN W WO CONT; Future 2. Near syncope -     MRI BRAIN W WO CONT; Future 
-     REFERRAL TO CARDIOLOGY 
 
 
 
---------------------------------------------------------------------------------------------------------------------- Below is my encounter: Chief Complaint Patient presents with  Dizziness Referred by: Dr. Marina HARRIS Tommie Quinonez is a 79-year-old woman with a history of palpitations and thyroiditis here for various symptoms. She tells me in 2016 progressing to now she has been suffering from intermittent imbalance and vertiginous symptoms and near syncopal events. She tells me that when she stands sometimes she has palpitations she feels like she may pass out. The dizziness is present constantly but worse on movement. She is being evaluated by the ENT currently and will see them in follow-up soon. She denies any slurred speech or double vision. No unusual weakness or paresthesias. Occasional mild headaches but non-migrainous. Occasional nausea in the morning but not persistent.  [id number] utilized. Review of Systems Cardiovascular: Positive for palpitations. Gastrointestinal: Positive for nausea. Neurological: Positive for dizziness and headaches. Near syncope All other systems reviewed and are negative. Past Medical History:  
Diagnosis Date  Hypertension  Palpitations  Thyroid nodule Family History Problem Relation Age of Onset  Kidney Disease Mother MARCUS  Hypertension Father  Diabetes Father  Kidney Disease Maternal Grandmother  Hypertension Maternal Grandmother  Cancer Maternal Grandfather   
  sto,acj  Hypertension Maternal Grandfather  No Known Problems Paternal Grandmother  No Known Problems Paternal Grandfather  No Known Problems Sister  No Known Problems Brother  Thyroid Disease Cousin x4  Thyroid Cancer Neg Hx Social History Social History  Marital status: SINGLE Spouse name: N/A  
 Number of children: N/A  
 Years of education: N/A Occupational History  Not on file. Social History Main Topics  Smoking status: Never Smoker  Smokeless tobacco: Never Used  Alcohol use No  
 Drug use: No  
 Sexual activity: Not on file Other Topics Concern  Not on file Social History Narrative Current Outpatient Prescriptions Medication Sig  
 meclizine (ANTIVERT) 25 mg tablet Take  by mouth three (3) times daily as needed.  metoprolol succinate (TOPROL-XL) 25 mg XL tablet TM 1 TA DI No current facility-administered medications for this visit. No Known Allergies Neurologic Exam  
 
Mental Status Oriented to person, place, and time. Attention: normal.  
Speech: speech is normal  
Level of consciousness: alert Cranial Nerves Cranial nerves II through XII intact. Motor Exam  
Muscle bulk: normal 
Overall muscle tone: normal 
 
Strength Strength 5/5 throughout. Sensory Exam  
Light touch normal.  
 
Gait, Coordination, and Reflexes Gait Gait: normal 
 
Coordination Finger to nose coordination: normal 
 
Tremor Resting tremor: absent Physical Exam  
Constitutional: She is oriented to person, place, and time. She appears well-developed and well-nourished. Cardiovascular: Normal rate.    
Pulmonary/Chest: Effort normal.  
 Neurological: She is oriented to person, place, and time. She has normal strength. She has a normal Finger-Nose-Finger Test. Gait normal.  
Skin: Skin is warm and dry. Psychiatric: She has a normal mood and affect. Her speech is normal and behavior is normal.  
Vitals reviewed. Visit Vitals  /80  Pulse 65  Resp 16  SpO2 99% No labs to review Assessment and Plan Diagnoses and all orders for this visit: 1. Vertigo -     MRI BRAIN W WO CONT; Future 2. Near syncope -     MRI BRAIN W WO CONT; Future 
-     REFERRAL TO CARDIOLOGY 79-year-old woman presenting with vertiginous symptoms that seem to have both central and peripheral features. No clear peripheral findings on exam.  Agree with plan for MRI brain with and without contrast.  I believe primary care is also ordered an MRA which is appropriate. She will see ENT in follow-up. I am also referring her to cardiology given the palpitations and near syncopal events. Difficult to clearly identify a neurologic etiology at this time. Could be orthostatic in origin, arrhythmia, migraine, peripheral vertigo, etc. 
I would like to see her after the imaging is done. Thank you for giving me the opportunity to assist in the care of Ms. Nathaniel Pathak. If you have questions, please do not hesitate to contact me. Sincerely, [address] [facility], DO Neurologist 
Diplomate AGGIE

## 2017-08-03 NOTE — MR AVS SNAPSHOT
Visit Information Date & Time Provider Department Dept. Phone Encounter #  
 8/3/2017 10:30 AM Kimo Bryant, [facility] at [address] [id number] Follow-up Instructions Return call after MRI done. Your Appointments 8/23/2017  2:30 PM  
Follow Up with MD [facility] Appt Note: 3 month f/u   [facility] [phone] [id number] [address] [facility] Upcoming Health Maintenance Date Due DTaP/Tdap/Td series (1 - Tdap) 8/8/2000 PAP AKA CERVICAL CYTOLOGY 8/8/2000 INFLUENZA AGE 9 TO ADULT 8/1/2017 Allergies as of 8/3/2017  Review Complete On: 8/3/2017 By: Cirilo Mejia,  No Known Allergies Current Immunizations  Never Reviewed No immunizations on file. Not reviewed this visit You Were Diagnosed With   
  
 Codes Comments Vertigo    -  Primary ICD-10-CM: A36 ICD-9-CM: 780.4 Near syncope     ICD-10-CM: R55 
ICD-9-CM: 780. 2 Vitals BP Pulse Resp SpO2 Smoking Status 124/80 65 16 99% Never Smoker Preferred Pharmacy Pharmacy Name Phone [facility] [address] [facility]  [place] [phone] Your Updated Medication List  
  
   
This list is accurate as of: 8/3/17 10:34 AM.  Always use your most recent med list.  
  
  
  
  
 meclizine 25 mg tablet Commonly known as:  ANTIVERT Take  by mouth three (3) times daily as needed. metoprolol succinate 25 mg XL tablet Commonly known as:  TOPROL-XL  
TM 1 TA DI We Performed the Following REFERRAL TO CARDIOLOGY [OEC85 Custom] Comments:  
 History of palpitations and near syncopal events persisting currently Follow-up Instructions Return call after MRI done. To-Do List   
 08/03/2017 Imaging:  MRI BRAIN W WO CONT Referral Information Referral ID Referred By Referred To  
  
 [id number] Vip[address] SHIELDS [address] [facility], [address] Phone: [phone] Fax: [phone] Visits Status Start Date End Date 1 New Request 8/3/17 8/3/18 If your referral has a status of pending review or denied, additional information will be sent to support the outcome of this decision. Patient Instructions PRESCRIPTION REFILL POLICY [facility] Statement to Patients April 1, 2014 In an effort to ensure the large volume of patient prescription refills is processed in the most efficient and expeditious manner, we are asking our patients to assist us by calling your Pharmacy for all prescription refills, this will include also your  Mail Order Pharmacy. The pharmacy will contact our office electronically to continue the refill process. Please do not wait until the last minute to call your pharmacy. We need at least 48 hours (2days) to fill prescriptions. We also encourage you to call your pharmacy before going to  your prescription to make sure it is ready. With regard to controlled substance prescription refill requests (narcotic refills) that need to be picked up at our office, we ask your cooperation by providing us with at least 72 hours (3days) notice that you will need a refill. We will not refill narcotic prescription refill requests after 4:00pm on any weekday, Monday through Thursday, or after 2:00pm on Fridays, or on the weekends. We encourage everyone to explore another way of getting your prescription refill request processed using [employer], our patient web portal through our electronic medical record system.  [employer] is an efficient and effective way to communicate your medication request directly to the office and  downloadable as an sara on your smart phone . [employer] also features a review functionality that allows you to view your medication list as well as leave messages for your physician. Are you ready to get connected? If so please review the attatched instructions or speak to any of our staff to get you set up right away! Thank you so much for your cooperation. Should you have any questions please contact our Practice Administrator. The Physicians and Staff,  [facility] Thank you for choosing [facility] and [facility] for your  
 
care. You may receive a survey about your visit. We appreciate you taking time  
 
to complete this survey as we use your feedback to improve our services. We  
 
realize we are not perfect, but we strive to provide excellent care. MRI of the Head: About This Test 
What is it? MRI (magnetic resonance imaging) is a test that uses a magnetic field and pulses of radio wave energy to make pictures of the organs and structures inside the body. An MRI of the head can give your doctor information about your brain, eyes, ears, and nerves. When you have an MRI, you lie on a table and the table moves into the MRI machine. Why is this test done? An MRI of the head can help find problems such as tumors and infection. It also can check symptoms of a head injury or of diseases such as multiple sclerosis (MS) and stroke. How can you prepare for the test? 
Talk to your doctor about all your health conditions before the test. For example, tell your doctor if: 
· You are allergic to any medicines. · You are or might be pregnant. · You have a pacemaker, an artificial limb, any metal pins or metal parts in your body, metal heart valves, metal clips in your brain, metal implants in your ears, or any other implanted or prosthetic medical device. · You have an intrauterine device (IUD) in place. · You get nervous in confined spaces. You may need medicine to help you relax. · You wear a patch that contains medicine. · You have kidney disease. What happens before the test? 
· You will remove all metal objects, such as hearing aids, dentures, jewelry, watches, and hairpins. · You may need to take off some of your clothes. You will be given a gown to wear during the test. If you do leave some clothes on, make sure you take everything out of your pockets. · You may have contrast material (dye) put into your arm through a tube called an IV. Contrast material helps doctors see specific organs, blood vessels, and most tumors. What happens during the test? 
· You will lie on your back on a table that is part of the MRI scanner. Your head, chest, and arms may be held with straps to help you lie still. · The table will slide into the space that contains the magnet. A device called a coil may be placed over or wrapped around your head. · Inside the scanner you will hear a fan and feel air moving. You may hear tapping, thumping, or snapping noises. You may be given earplugs or headphones to reduce the noise. · You will be asked to hold still during the scan. You may be asked to hold your breath for short periods. · You may be alone in the scanning room, but a technologist will be watching you through a window and talking with you during the test. 
What else should you know about the test? 
· An MRI does not hurt. · If a dye is used, you may feel a quick sting or pinch and some coolness when the IV is started. The dye may give you a metallic taste in your mouth. Some people feel sick to their stomach or get a headache. · If you breastfeed and are concerned about whether the dye used in this test is safe, talk to your doctor. Most experts believe that very little dye passes into breast milk and even less is passed on to the baby.  But if you prefer, you can store some of your breast milk ahead of time and use it for a day or two after the test. 
· You may feel warmth in the area being examined. This is normal. 
How long does the test take? · The test usually takes 30 to 60 minutes but can take as long as 2 hours. What happens after the test? 
· You will probably be able to go home right away, depending on the reason for the test. 
· You can go back to your usual activities right away. Follow-up care is a key part of your treatment and safety. Be sure to make and go to all appointments, and call your doctor if you are having problems. It's also a good idea to keep a list of the medicines you take. Ask your doctor when you can expect to have your test results. Where can you learn more? Go to http://jareth-femi.info/. Enter B140 in the search box to learn more about \"MRI of the Head: About This Test.\" Current as of: October 14, 2016 Content Version: 11.3 © 3867-5091 Healthwise, Inc[employer]. Care instructions adapted under license by [employer] (which disclaims liability or warranty for this information). If you have questions about a medical condition or this instruction, always ask your healthcare professional. [address] any warranty or liability for your use of this information. Introducing [facility]! [facility] introduces [employer] patient portal. Now you can access parts of your medical record, email your doctor's office, and request medication refills online. 1. In your internet browser, go to [email or web address] 2. Click on the First Time User? Click Here link in the Sign In box. You will see the New Member Sign Up page. 3. Enter your [employer] Access Code exactly as it appears below. You will not need to use this code after youve completed the sign-up process. If you do not sign up before the expiration date, you must request a new code. · Pops Access Code: [email or web address] Expires: 8/13/2017  2:13 PM 
 
4. Enter the last four digits of your Social Security Number (xxxx) and Date of Birth (mm/dd/yyyy) as indicated and click Submit. You will be taken to the next sign-up page. 5. Create a Pops ID. This will be your Pops login ID and cannot be changed, so think of one that is secure and easy to remember. 6. Create a Pops password. You can change your password at any time. 7. Enter your Password Reset Question and Answer. This can be used at a later time if you forget your password. 8. Enter your e-mail address. You will receive e-mail notification when new information is available in [address] 9. Click Sign Up. You can now view and download portions of your medical record. 10. Click the Download Summary menu link to download a portable copy of your medical information. If you have questions, please visit the Frequently Asked Questions section of the Pops website. Remember, Pops is NOT to be used for urgent needs. For medical emergencies, dial 911. Now available from your iPhone and Android! Please provide this summary of care documentation to your next provider. Your primary care clinician is listed as Abigail Don. If you have any questions after today's visit, please call [phone].

## 2017-08-03 NOTE — PATIENT INSTRUCTIONS
[facility]   Statement to Patients  April 1, 2014      In an effort to ensure the large volume of patient prescription refills is processed in the most efficient and expeditious manner, we are asking our patients to assist us by calling your Pharmacy for all prescription refills, this will include also your  Mail Order Pharmacy. The pharmacy will contact our office electronically to continue the refill process. Please do not wait until the last minute to call your pharmacy. We need at least 48 hours (2days) to fill prescriptions. We also encourage you to call your pharmacy before going to  your prescription to make sure it is ready. With regard to controlled substance prescription refill requests (narcotic refills) that need to be picked up at our office, we ask your cooperation by providing us with at least 72 hours (3days) notice that you will need a refill. We will not refill narcotic prescription refill requests after 4:00pm on any weekday, Monday through Thursday, or after 2:00pm on Fridays, or on the weekends. We encourage everyone to explore another way of getting your prescription refill request processed using "[employer] our patient web portal through our electronic medical record system. "[employer] is an efficient and effective way to communicate your medication request directly to the office and  downloadable as an sara on your smart phone . "[employer] also features a review functionality that allows you to view your medication list as well as leave messages for your physician. Are you ready to get connected? If so please review the attatched instructions or speak to any of our staff to get you set up right away! Thank you so much for your cooperation. Should you have any questions please contact our Practice Administrator.     The Physicians and Staff,  [facility]     Thank you for choosing [facility] for your     care. You may receive a survey about your visit. We appreciate you taking time     to complete this survey as we use your feedback to improve our services. We     realize we are not perfect, but we strive to provide excellent care. MRI of the Head: About This Test  What is it? MRI (magnetic resonance imaging) is a test that uses a magnetic field and pulses of radio wave energy to make pictures of the organs and structures inside the body. An MRI of the head can give your doctor information about your brain, eyes, ears, and nerves. When you have an MRI, you lie on a table and the table moves into the MRI machine. Why is this test done? An MRI of the head can help find problems such as tumors and infection. It also can check symptoms of a head injury or of diseases such as multiple sclerosis (MS) and stroke. How can you prepare for the test?  Talk to your doctor about all your health conditions before the test. For example, tell your doctor if:  · You are allergic to any medicines. · You are or might be pregnant. · You have a pacemaker, an artificial limb, any metal pins or metal parts in your body, metal heart valves, metal clips in your brain, metal implants in your ears, or any other implanted or prosthetic medical device. · You have an intrauterine device (IUD) in place. · You get nervous in confined spaces. You may need medicine to help you relax. · You wear a patch that contains medicine. · You have kidney disease. What happens before the test?  · You will remove all metal objects, such as hearing aids, dentures, jewelry, watches, and hairpins. · You may need to take off some of your clothes. You will be given a gown to wear during the test. If you do leave some clothes on, make sure you take everything out of your pockets. · You may have contrast material (dye) put into your arm through a tube called an IV.  Contrast material helps doctors see specific organs, blood vessels, and most tumors. What happens during the test?  · You will lie on your back on a table that is part of the MRI scanner. Your head, chest, and arms may be held with straps to help you lie still. · The table will slide into the space that contains the magnet. A device called a coil may be placed over or wrapped around your head. · Inside the scanner you will hear a fan and feel air moving. You may hear tapping, thumping, or snapping noises. You may be given earplugs or headphones to reduce the noise. · You will be asked to hold still during the scan. You may be asked to hold your breath for short periods. · You may be alone in the scanning room, but a technologist will be watching you through a window and talking with you during the test.  What else should you know about the test?  · An MRI does not hurt. · If a dye is used, you may feel a quick sting or pinch and some coolness when the IV is started. The dye may give you a metallic taste in your mouth. Some people feel sick to their stomach or get a headache. · If you breastfeed and are concerned about whether the dye used in this test is safe, talk to your doctor. Most experts believe that very little dye passes into breast milk and even less is passed on to the baby. But if you prefer, you can store some of your breast milk ahead of time and use it for a day or two after the test.  · You may feel warmth in the area being examined. This is normal.  How long does the test take? · The test usually takes 30 to 60 minutes but can take as long as 2 hours. What happens after the test?  · You will probably be able to go home right away, depending on the reason for the test.  · You can go back to your usual activities right away. Follow-up care is a key part of your treatment and safety. Be sure to make and go to all appointments, and call your doctor if you are having problems. It's also a good idea to keep a list of the medicines you take.  Ask your doctor when you can expect to have your test results. Where can you learn more? Go to http://jareth-femi.info/. Enter C102 in the search box to learn more about \"MRI of the Head: About This Test.\"  Current as of: October 14, 2016  Content Version: 11.3  © 0271-4185 [employer]. Care instructions adapted under license by [employer] (which disclaims liability or warranty for this information). If you have questions about a medical condition or this instruction, always ask your healthcare professional. [address] any warranty or liability for your use of this information.

## 2017-08-03 NOTE — COMMUNICATION BODY
Chief Complaint   Patient presents with    Dizziness       Referred by: Dr. Jef HARRIS    Tala Lowe is a 34-year-old woman with a history of palpitations and thyroiditis here for various symptoms. She tells me in 2016 progressing to now she has been suffering from intermittent imbalance and vertiginous symptoms and near syncopal events. She tells me that when she stands sometimes she has palpitations she feels like she may pass out. The dizziness is present constantly but worse on movement. She is being evaluated by the ENT currently and will see them in follow-up soon. She denies any slurred speech or double vision. No unusual weakness or paresthesias. Occasional mild headaches but non-migrainous. Occasional nausea in the morning but not persistent.  [id number] utilized. Review of Systems   Cardiovascular: Positive for palpitations. Gastrointestinal: Positive for nausea. Neurological: Positive for dizziness and headaches. Near syncope   All other systems reviewed and are negative. Past Medical History:   Diagnosis Date    Hypertension     Palpitations     Thyroid nodule      Family History   Problem Relation Age of Onset    Kidney Disease Mother      MARCUS    Hypertension Father     Diabetes Father     Kidney Disease Maternal Grandmother     Hypertension Maternal Grandmother     Cancer Maternal Grandfather      sto,acj    Hypertension Maternal Grandfather     No Known Problems Paternal Grandmother     No Known Problems Paternal Grandfather     No Known Problems Sister     No Known Problems Brother     Thyroid Disease Cousin      x4    Thyroid Cancer Neg Hx      Social History     Social History    Marital status: SINGLE     Spouse name: N/A    Number of children: N/A    Years of education: N/A     Occupational History    Not on file.      Social History Main Topics    Smoking status: Never Smoker    Smokeless tobacco: Never Used   [facility] Alcohol use No    Drug use: No    Sexual activity: Not on file     Other Topics Concern    Not on file     Social History Narrative     Current Outpatient Prescriptions   Medication Sig    meclizine (ANTIVERT) 25 mg tablet Take  by mouth three (3) times daily as needed.  metoprolol succinate (TOPROL-XL) 25 mg XL tablet TM 1 TA DI     No current facility-administered medications for this visit. No Known Allergies      Neurologic Exam     Mental Status   Oriented to person, place, and time. Attention: normal.   Speech: speech is normal   Level of consciousness: alert    Cranial Nerves   Cranial nerves II through XII intact. Motor Exam   Muscle bulk: normal  Overall muscle tone: normal    Strength   Strength 5/5 throughout. Sensory Exam   Light touch normal.     Gait, Coordination, and Reflexes     Gait  Gait: normal    Coordination   Finger to nose coordination: normal    Tremor   Resting tremor: absent    Physical Exam   Constitutional: She is oriented to person, place, and time. She appears well-developed and well-nourished. Cardiovascular: Normal rate. Pulmonary/Chest: Effort normal.   Neurological: She is oriented to person, place, and time. She has normal strength. She has a normal Finger-Nose-Finger Test. Gait normal.   Skin: Skin is warm and dry. Psychiatric: She has a normal mood and affect. Her speech is normal and behavior is normal.   Vitals reviewed. Visit Vitals    /80    Pulse 65    Resp 16    SpO2 99%     No labs to review      Assessment and Plan Diagnoses and all orders for this visit:    1. Vertigo  -     MRI BRAIN W WO CONT; Future    2. Near syncope  -     MRI BRAIN W WO CONT; Future  -     REFERRAL TO CARDIOLOGY      58-year-old woman presenting with vertiginous symptoms that seem to have both central and peripheral features.   No clear peripheral findings on exam.  Agree with plan for MRI brain with and without contrast.  I believe primary care is also ordered an MRA which is appropriate. She will see ENT in follow-up. I am also referring her to cardiology given the palpitations and near syncopal events. Difficult to clearly identify a neurologic etiology at this time. Could be orthostatic in origin, arrhythmia, migraine, peripheral vertigo, etc.  I would like to see her after the imaging is done. I reviewed and decided to continue the current medications. A notice of this visit/encounter being completed has been sent electronically to the patient's PCP and/or referring provider.      [address] [place] [address], [facility] [address]  Diplomate ABPN

## 2017-08-03 NOTE — PROGRESS NOTES
Chief Complaint   Patient presents with    Dizziness       Referred by: Dr. Rupal HARRIS    Cherelle Ennis is a 42-year-old woman with a history of palpitations and thyroiditis here for various symptoms. She tells me in 2016 progressing to now she has been suffering from intermittent imbalance and vertiginous symptoms and near syncopal events. She tells me that when she stands sometimes she has palpitations she feels like she may pass out. The dizziness is present constantly but worse on movement. She is being evaluated by the ENT currently and will see them in follow-up soon. She denies any slurred speech or double vision. No unusual weakness or paresthesias. Occasional mild headaches but non-migrainous. Occasional nausea in the morning but not persistent.  [id number] utilized. Review of Systems   Cardiovascular: Positive for palpitations. Gastrointestinal: Positive for nausea. Neurological: Positive for dizziness and headaches. Near syncope   All other systems reviewed and are negative. Past Medical History:   Diagnosis Date    Hypertension     Palpitations     Thyroid nodule      Family History   Problem Relation Age of Onset    Kidney Disease Mother      MARCUS    Hypertension Father     Diabetes Father     Kidney Disease Maternal Grandmother     Hypertension Maternal Grandmother     Cancer Maternal Grandfather      sto,acj    Hypertension Maternal Grandfather     No Known Problems Paternal Grandmother     No Known Problems Paternal Grandfather     No Known Problems Sister     No Known Problems Brother     Thyroid Disease Cousin      x4    Thyroid Cancer Neg Hx      Social History     Social History    Marital status: SINGLE     Spouse name: N/A    Number of children: N/A    Years of education: N/A     Occupational History    Not on file.      Social History Main Topics    Smoking status: Never Smoker    Smokeless tobacco: Never Used   Ann Marie Telles Alcohol use No    Drug use: No    Sexual activity: Not on file     Other Topics Concern    Not on file     Social History Narrative     Current Outpatient Prescriptions   Medication Sig    meclizine (ANTIVERT) 25 mg tablet Take  by mouth three (3) times daily as needed.  metoprolol succinate (TOPROL-XL) 25 mg XL tablet TM 1 TA DI     No current facility-administered medications for this visit. No Known Allergies      Neurologic Exam     Mental Status   Oriented to person, place, and time. Attention: normal.   Speech: speech is normal   Level of consciousness: alert    Cranial Nerves   Cranial nerves II through XII intact. Motor Exam   Muscle bulk: normal  Overall muscle tone: normal    Strength   Strength 5/5 throughout. Sensory Exam   Light touch normal.     Gait, Coordination, and Reflexes     Gait  Gait: normal    Coordination   Finger to nose coordination: normal    Tremor   Resting tremor: absent    Physical Exam   Constitutional: She is oriented to person, place, and time. She appears well-developed and well-nourished. Cardiovascular: Normal rate. Pulmonary/Chest: Effort normal.   Neurological: She is oriented to person, place, and time. She has normal strength. She has a normal Finger-Nose-Finger Test. Gait normal.   Skin: Skin is warm and dry. Psychiatric: She has a normal mood and affect. Her speech is normal and behavior is normal.   Vitals reviewed. Visit Vitals    /80    Pulse 65    Resp 16    SpO2 99%     No labs to review      Assessment and Plan Diagnoses and all orders for this visit:    1. Vertigo  -     MRI BRAIN W WO CONT; Future    2. Near syncope  -     MRI BRAIN W WO CONT; Future  -     REFERRAL TO CARDIOLOGY      54-year-old woman presenting with vertiginous symptoms that seem to have both central and peripheral features.   No clear peripheral findings on exam.  Agree with plan for MRI brain with and without contrast.  I believe primary care is also ordered an MRA which is appropriate. She will see ENT in follow-up. I am also referring her to cardiology given the palpitations and near syncopal events. Difficult to clearly identify a neurologic etiology at this time. Could be orthostatic in origin, arrhythmia, migraine, peripheral vertigo, etc.  I would like to see her after the imaging is done. I reviewed and decided to continue the current medications. A notice of this visit/encounter being completed has been sent electronically to the patient's PCP and/or referring provider.      [address] [place] [address], [facility] [address]  Diplomate ABPN

## 2017-08-19 ENCOUNTER — HOSPITAL ENCOUNTER (OUTPATIENT)
Dept: MRI IMAGING | Age: 37
Discharge: HOME | End: 2017-08-19
Attending: INTERNAL MEDICINE
Payer: MEDICAID

## 2017-08-19 DIAGNOSIS — R55 NEAR SYNCOPE: ICD-10-CM

## 2017-08-19 DIAGNOSIS — R42 VERTIGO: ICD-10-CM

## 2017-08-19 DIAGNOSIS — R42 DIZZINESS AND GIDDINESS: ICD-10-CM

## 2017-08-19 PROCEDURE — 70547 MR ANGIOGRAPHY NECK W/O DYE: CPT

## 2017-08-19 PROCEDURE — 72141 MRI NECK SPINE W/O DYE: CPT

## 2017-08-19 PROCEDURE — 70544 MR ANGIOGRAPHY HEAD W/O DYE: CPT

## 2017-08-19 PROCEDURE — 70551 MRI BRAIN STEM W/O DYE: CPT

## 2017-08-21 ENCOUNTER — HOSPITAL ENCOUNTER (EMERGENCY)
Age: 37
Discharge: HOME | End: 2017-08-21
Attending: STUDENT IN AN ORGANIZED HEALTH CARE EDUCATION/TRAINING PROGRAM
Payer: MEDICAID

## 2017-08-21 VITALS
OXYGEN SATURATION: 100 % | TEMPERATURE: 98.1 F | BODY MASS INDEX: 22.72 KG/M2 | HEART RATE: 70 BPM | DIASTOLIC BLOOD PRESSURE: 57 MMHG | SYSTOLIC BLOOD PRESSURE: 116 MMHG | WEIGHT: 128.25 LBS | HEIGHT: 63 IN | RESPIRATION RATE: 19 BRPM

## 2017-08-21 DIAGNOSIS — R42 DIZZINESS: Primary | ICD-10-CM

## 2017-08-21 LAB
ALBUMIN SERPL-MCNC: 4 G/DL (ref 3.5–5)
ALBUMIN/GLOB SERPL: 1 {RATIO} (ref 1.1–2.2)
ALP SERPL-CCNC: 58 U/L (ref 45–117)
ALT SERPL-CCNC: 15 U/L (ref 12–78)
ANION GAP SERPL CALC-SCNC: 7 MMOL/L (ref 5–15)
AST SERPL-CCNC: 13 U/L (ref 15–37)
BASOPHILS # BLD: 0 K/UL (ref 0–0.1)
BASOPHILS NFR BLD: 0 % (ref 0–1)
BILIRUB SERPL-MCNC: 0.2 MG/DL (ref 0.2–1)
BUN SERPL-MCNC: 14 MG/DL (ref 6–20)
BUN/CREAT SERPL: 20 (ref 12–20)
CALCIUM SERPL-MCNC: 9.3 MG/DL (ref 8.5–10.1)
CHLORIDE SERPL-SCNC: 103 MMOL/L (ref 97–108)
CO2 SERPL-SCNC: 30 MMOL/L (ref 21–32)
CREAT SERPL-MCNC: 0.7 MG/DL (ref 0.55–1.02)
EOSINOPHIL # BLD: 0.3 K/UL (ref 0–0.4)
EOSINOPHIL NFR BLD: 4 % (ref 0–7)
ERYTHROCYTE [DISTWIDTH] IN BLOOD BY AUTOMATED COUNT: 13.2 % (ref 11.5–14.5)
GLOBULIN SER CALC-MCNC: 4.2 G/DL (ref 2–4)
GLUCOSE SERPL-MCNC: 94 MG/DL (ref 65–100)
HCT VFR BLD AUTO: 40.8 % (ref 35–47)
HGB BLD-MCNC: 14.1 G/DL (ref 11.5–16)
LYMPHOCYTES # BLD: 2.9 K/UL (ref 0.8–3.5)
LYMPHOCYTES NFR BLD: 42 % (ref 12–49)
MCH RBC QN AUTO: 29.6 PG (ref 26–34)
MCHC RBC AUTO-ENTMCNC: 34.6 G/DL (ref 30–36.5)
MCV RBC AUTO: 85.5 FL (ref 80–99)
MONOCYTES # BLD: 0.5 K/UL (ref 0–1)
MONOCYTES NFR BLD: 8 % (ref 5–13)
NEUTS SEG # BLD: 3.2 K/UL (ref 1.8–8)
NEUTS SEG NFR BLD: 46 % (ref 32–75)
PLATELET # BLD AUTO: 419 K/UL (ref 150–400)
POTASSIUM SERPL-SCNC: 3.5 MMOL/L (ref 3.5–5.1)
PROT SERPL-MCNC: 8.2 G/DL (ref 6.4–8.2)
RBC # BLD AUTO: 4.77 M/UL (ref 3.8–5.2)
SODIUM SERPL-SCNC: 140 MMOL/L (ref 136–145)
WBC # BLD AUTO: 6.9 K/UL (ref 3.6–11)

## 2017-08-21 PROCEDURE — 36415 COLL VENOUS BLD VENIPUNCTURE: CPT | Performed by: STUDENT IN AN ORGANIZED HEALTH CARE EDUCATION/TRAINING PROGRAM

## 2017-08-21 PROCEDURE — 99285 EMERGENCY DEPT VISIT HI MDM: CPT

## 2017-08-21 PROCEDURE — 93005 ELECTROCARDIOGRAM TRACING: CPT

## 2017-08-21 PROCEDURE — 80053 COMPREHEN METABOLIC PANEL: CPT | Performed by: STUDENT IN AN ORGANIZED HEALTH CARE EDUCATION/TRAINING PROGRAM

## 2017-08-21 PROCEDURE — 85025 COMPLETE CBC W/AUTO DIFF WBC: CPT | Performed by: STUDENT IN AN ORGANIZED HEALTH CARE EDUCATION/TRAINING PROGRAM

## 2017-08-22 LAB
ATRIAL RATE: 70 BPM
CALCULATED P AXIS, ECG09: 42 DEGREES
CALCULATED R AXIS, ECG10: 32 DEGREES
CALCULATED T AXIS, ECG11: 26 DEGREES
DIAGNOSIS, 93000: NORMAL
P-R INTERVAL, ECG05: 154 MS
Q-T INTERVAL, ECG07: 378 MS
QRS DURATION, ECG06: 82 MS
QTC CALCULATION (BEZET), ECG08: 408 MS
VENTRICULAR RATE, ECG03: 70 BPM

## 2017-08-22 NOTE — DISCHARGE INSTRUCTIONS
We hope that we have addressed all of your medical concerns. The examination and treatment you received in the Emergency Department were for an emergent problem and were not intended as complete care. It is important that you follow up with your healthcare provider(s) for ongoing care. If your symptoms worsen or do not improve as expected, and you are unable to reach your usual health care provider(s), you should return to the Emergency Department. Today's healthcare is undergoing tremendous change, and patient satisfaction surveys are one of the many tools to assess the quality of medical care. You may receive a survey from the [employer] regarding your experience in the Emergency Department. I hope that your experience has been completely positive, particularly the medical care that I provided. As such, please participate in the survey; anything less than excellent does not meet my expectations or intentions. [facility] and [address] [place] [address] participate in nationally recognized quality of care measures. If your blood pressure is greater than 120/80, as reported below, we urge that you seek medical care to address the potential of high blood pressure, commonly known as hypertension. Hypertension can be hereditary or can be caused by certain medical conditions, pain, stress, or \"white coat syndrome. \"       Please make an appointment with your health care provider(s) for follow up of your Emergency Department visit. VITALS:   Patient Vitals for the past 8 hrs:   Temp Pulse Resp BP SpO2   08/21/17 1919 - 70 14 - -   08/21/17 1918 - - - 115/66 -   08/21/17 1832 98.5 °F (36.9 °C) 79 16 130/85 100 %          Thank you for allowing us to provide you with medical care today. We realize that you have many choices for your emergency care needs. Please choose us in the future for any continued health care needs. Lisa Manuel Trevin Garcia, [address]   Office: [phone]            Recent Results (from the past 24 hour(s))   EKG, 12 LEAD, INITIAL    Collection Time: 08/21/17  7:15 PM   Result Value Ref Range    Ventricular Rate 70 BPM    Atrial Rate 70 BPM    P-R Interval 154 ms    QRS Duration 82 ms    Q-T Interval 378 ms    QTC Calculation (Bezet) 408 ms    Calculated P Axis 42 degrees    Calculated R Axis 32 degrees    Calculated T Axis 26 degrees    Diagnosis Normal sinus rhythm  No previous ECGs available      CBC WITH AUTOMATED DIFF    Collection Time: 08/21/17  7:18 PM   Result Value Ref Range    WBC 6.9 3.6 - 11.0 K/uL    RBC 4.77 3.80 - 5.20 M/uL    HGB 14.1 11.5 - 16.0 g/dL    HCT 40.8 35.0 - 47.0 %    MCV 85.5 80.0 - 99.0 FL    MCH 29.6 26.0 - 34.0 PG    MCHC 34.6 30.0 - 36.5 g/dL    RDW 13.2 11.5 - 14.5 %    PLATELET 184 (H) 389 - 400 K/uL    NEUTROPHILS 46 32 - 75 %    LYMPHOCYTES 42 12 - 49 %    MONOCYTES 8 5 - 13 %    EOSINOPHILS 4 0 - 7 %    BASOPHILS 0 0 - 1 %    ABS. NEUTROPHILS 3.2 1.8 - 8.0 K/UL    ABS. LYMPHOCYTES 2.9 0.8 - 3.5 K/UL    ABS. MONOCYTES 0.5 0.0 - 1.0 K/UL    ABS. EOSINOPHILS 0.3 0.0 - 0.4 K/UL    ABS. BASOPHILS 0.0 0.0 - 0.1 K/UL   METABOLIC PANEL, COMPREHENSIVE    Collection Time: 08/21/17  7:18 PM   Result Value Ref Range    Sodium 140 136 - 145 mmol/L    Potassium 3.5 3.5 - 5.1 mmol/L    Chloride 103 97 - 108 mmol/L    CO2 30 21 - 32 mmol/L    Anion gap 7 5 - 15 mmol/L    Glucose 94 65 - 100 mg/dL    BUN 14 6 - 20 MG/DL    Creatinine 0.70 0.55 - 1.02 MG/DL    BUN/Creatinine ratio 20 12 - 20      GFR est AA >60 >60 ml/min/1.73m2    GFR est non-AA >60 >60 ml/min/1.73m2    Calcium 9.3 8.5 - 10.1 MG/DL    Bilirubin, total 0.2 0.2 - 1.0 MG/DL    ALT (SGPT) 15 12 - 78 U/L    AST (SGOT) 13 (L) 15 - 37 U/L    Alk.  phosphatase 58 45 - 117 U/L    Protein, total 8.2 6.4 - 8.2 g/dL    Albumin 4.0 3.5 - 5.0 g/dL    Globulin 4.2 (H) 2.0 - 4.0 g/dL    A-G Ratio 1.0 (L) 1.1 - 2.2 No results found. Mareos: Instrucciones de cuidado - [ Dizziness: Care Instructions ]  Instrucciones de cuidado  Los mareos son [place] sensación de inestabilidad o confusión en la sosa. Son distintos al vértigo, buck sensación de que la habitación gira o de que usted se mueve o . También es distinto del aturdimiento, que es la sensación de que está a punto de desmayarse. Puede resultar difícil conocer la causa de los [place]. Algunas personas se sienten mareadas cuando tienen migrañas. A veces, los episodios gripales pueden hacer que se sienta mareado. Algunas afecciones médicas, kyle los problemas cardíacos o la presión arterial juan, pueden hacer que se sienta mareado. Muchos medicamentos pueden causar mareos, kyle los que se usan para la presión arterial juan, el dolor o la ansiedad. Si es un medicamento el que está causando los síntomas, glass médico podría recomendarle que lo cambie o deje de tomarlo. Si es un problema cardíaco, podría necesitar medicamentos para ayudar a que glass corazón funcione mejor. Si no hay razón aparente para los síntomas, glass médico podría sugerir vigilar y esperar tee un tiempo para marija si los mareos desaparecen por sí solos. La atención de seguimiento es buck parte clave de glass tratamiento y seguridad. Asegúrese de hacer y acudir a todas las citas, y llame a glass médico si está teniendo problemas. También es buck buena idea saber los resultados de jayleen exámenes y mantener buck lista de los medicamentos que ezequiel. ¿Cómo puede cuidarse en el hogar? · Si glass médico le recomienda o receta medicamentos, tómelos exactamente según las indicaciones. Llame a glass médico si danny estar teniendo un problema con glass medicamento. · No conduzca mientras se sienta mareado. · Trate de prevenir las caídas. Algunas medidas que puede ted son:  Sameera Blake Usar tapetes antideslizantes, agregar agarraderas cerca de la kasey y usar luces nocturnas.   ¨ Ordenar glass casa de diamond manera que en los senderos no haya nada con lo que se pueda tropezar. ¨ Avisarles a familiares y [facility] que se ha estado sintiendo [place] les servirá para saber cómo ayudarle. ¿Cuándo debe pedir ayuda? Llame al 911 en cualquier momento que considere que necesita atención de [place]. Por ejemplo, llame si:  · Se desmayó (perdió el conocimiento). · Tiene mareos junto con síntomas de un ataque cardíaco. Estos pueden incluir:  ¨ Dolor de pecho, o presión o buck sensación extraña en el pecho. ¨ Sudoración. ¨ Falta de aire. ¨ Náuseas o vómito. ¨ Dolor, presión, o buck sensación extraña en la espalda, el sean, la mandíbula o el abdomen superior, o en all o ambos hombros o brazos. ¨ Aturdimiento o debilidad repentina. ¨ Un latido cardíaco rápido o irregular. · Tiene síntomas de un ataque cerebral. Estos pueden incluir:  ¨ Entumecimiento, hormigueo, debilidad o parálisis repentinos en la chris, el brazo o la pierna, sobre todo si ocurre en un solo lado del cuerpo. ¨ Cambios súbitos en la vista. ¨ Problemas repentinos para hablar. ¨ Confusión súbita o dificultad repentina para comprender frases sencillas. ¨ Problemas repentinos para caminar o mantener el equilibrio. ¨ Un dolor de sosa intenso y repentino, distinto a los sahara de sosa anteriores. Llame a glass médico ahora mismo o busque atención médica inmediata si:  · Se siente mareado y [place], dolor de [place] o zumbido [place] oídos. · Tiene nuevas náuseas y vómito o [address] · Loli mareos no desaparecen o regresan. Preste especial atención a los cambios en glass jaden y asegúrese de comunicarse con glass médico si:  · No mejora kyle se esperaba. ¿Dónde puede encontrar más información en inglés? Jose Luis Sinks a http://jareth-femi.info/. Yisel Glez [id number] en la búsqueda para aprender más acerca de \"Mareos: Instrucciones de cuidado - [ Dizziness: Care Instructions ]. \"  Revisado: 20 marzo, 2017  Versión del contenido: 11.3  © 6603-3378 [employer].  Dony Villegas instrucciones de cuidado fueron adaptadas bajo licencia por [employer] (which disclaims liability or warranty for this information). Si usted tiene [place] afección médica o sobre estas instrucciones, siempre pregunte a glass profesional de jaden. [facility], [employer]orporated niega toda garantía o responsabilidad por glass uso de esta información. Aturdimiento o desmayo: Instrucciones de cuidado - [ Lang Dykes or Faintness: Care Instructions ]  Instrucciones de cuidado  El aturdimiento es la sensación de que está a punto de desmayarse o de \"perder el conocimiento\". No se siente kyle si usted o lo que le rodea se [place]. Es distinto del vértigo, que es la sensación de que usted o las cosas que le rodean dan vueltas o se inclinan. El aturdimiento suele desaparecer o mejorar cuando se acuesta. Si el aturdimiento empeora, esto puede conducir a un desvanecimiento. Es común sentirse aturdido de [employer]. Por lo general, el aturdimiento no se debe a un problema grave. A menudo es causado por buck disminución de corta duración de la presión arterial y el flujo de charles hacia la sosa que se produce al ponerse de pie con demasiada rapidez cuando está acostado o sentado. La atención de seguimiento es buck parte clave de glass tratamiento y seguridad. Asegúrese de hacer y acudir a todas las citas, y llame a glass médico si está teniendo problemas. También es buck buena idea saber los resultados de jayleen exámenes y mantener buck lista de los medicamentos que ezequiel. ¿Cómo puede cuidarse en el hogar? · Acuéstese tee 1 o 2 minutos cuando se sienta aturdido. Después de acostarse, siéntese lentamente y permanezca sentado de 1 a 2 minutos antes de ponerse de pie lentamente. · Evite los movimientos, las posturas o las actividades que le hayan producido aturdimiento en el pasado. · Descanse mucho, en especial si está resfriado o tiene gripe, ya que estas pueden causar aturdimiento.   · Asegúrese de beber abundante líquido, en especial si tiene fiebre o si ha estado sudando. · No conduzca ni se ponga a sí mismo o ponga a otros en peligro mientras se sienta aturdido. ¿Cuándo debe pedir ayuda? Llame al 911 en cualquier momento que considere que necesita atención de [place]. Por ejemplo, llame si:  · Tiene síntomas de un ataque cerebral. Estos pueden incluir:  ¨ Entumecimiento, hormigueo, debilidad o parálisis repentinos en la chris, el brazo o la pierna, sobre todo si ocurre en un solo lado del cuerpo. ¨ Cambios súbitos en la vista. ¨ Problemas repentinos para hablar. ¨ Confusión súbita o dificultad repentina para comprender frases sencillas. ¨ Problemas repentinos para caminar o mantener el equilibrio. ¨ Un dolor de sosa intenso y repentino, distinto a los sahara de sosa anteriores. · Tiene síntomas de un ataque al corazón. Estos podrían incluir:  ¨ Dolor o presión en el pecho, o buck sensación extraña en el pecho. ¨ Sudoración. ¨ Falta de aire. ¨ Náuseas o vómito. ¨ Dolor, presión o buck sensación extraña en la espalda, el sean, la mandíbula, la parte superior del abdomen, o en all o ambos hombros o brazos. ¨ Aturdimiento o debilidad repentina. ¨ Latidos cardíacos rápidos o irregulares. Cuando llame al 911, es posible que le digan que mastique 1 aspirina para adultos o 2 a 4 aspirinas de dosis baja. Espere a la ambulancia. No trate de conducir usted mismo un automóvil. Preste especial atención a los cambios en glass jaden y asegúrese de comunicarse con glass médico si:  · El aturdimiento [place] o no mejora con los cuidados en el hogar. ¿Dónde puede encontrar más información en inglés? Gregor Corral a http://jareth-femi.info/. [place] N145 en la búsqueda para aprender más acerca de \"Aturdimiento o desmayo: Instrucciones de cuidado - [ Donice Hire or Faintness: Care Instructions ]. \"  Revisado: 20 marzo, 2017  Versión del contenido: 11.3  © 0173-1688 [employer].  Sedrick Sampson instrucciones de cuidado fueron adaptadas bajo licencia por [facility] [employer] (which disclaims liability or warranty for this information). Si usted tiene [place] afección médica o sobre estas instrucciones, siempre pregunte a glass profesional de jaden. [facility], [employer]orporated niega toda garantía o responsabilidad por glass uso de esta información.

## 2017-08-22 NOTE — ED NOTES
Patient given discharge instructions and verbalized understanding; PIV discontinued. Patient ambulatory out of department with steady gait accompanied by male .

## 2017-08-22 NOTE — ED PROVIDER NOTES
HPI Comments: 44 y.o. female with past medical history significant for HTN and multiple thyroid nodules who presents to the ED with chief complaint of dizziness. Patient reports episode of dizziness and near syncope with onset ~1 month ago; reports her episodes of symptoms became so frequent that she had to quit her job. Patient also reports SOB with recent onset. Patient reports being seen by a neurologist, Dr. Stanley Orellana DO, for her symptoms; reports undergoing an MRI of the brain ~2-3 days ago and being referred to a cardiologist. Patient reports going on a ~2-3 hour car ride recently; denies going on an airplane trip recently. Patient reports a history of HTN, depression, and thyroid disease. Patient reports being on Metoprolol regularly. Patient denies chest pain, leg pain, and leg swelling. There are no other acute medical concerns at this time. PCP: Romina Thrasher MD  Neurologist: Stanley Orellana DO    Note written by Courtney Gilliam, as dictated by Chica Ellis MD 8:01 PM     The history is provided by the patient. The history is limited by a language barrier. A  was used. Past Medical History:   Diagnosis Date    Hypertension     Multiple thyroid nodules        History reviewed. No pertinent surgical history. History reviewed. No pertinent family history. Social History     Social History    Marital status: SINGLE     Spouse name: N/A    Number of children: N/A    Years of education: N/A     Occupational History    Not on file. Social History Main Topics    Smoking status: Never Smoker    Smokeless tobacco: Never Used    Alcohol use No    Drug use: Not on file    Sexual activity: Not on file     Other Topics Concern    Not on file     Social History Narrative    No narrative on file         ALLERGIES: Review of patient's allergies indicates no known allergies.     Review of Systems   Constitutional: Negative for activity change, diaphoresis, fatigue and fever. HENT: Negative for congestion and sore throat. Eyes: Negative for photophobia and visual disturbance. Respiratory: Positive for shortness of breath. Negative for chest tightness. Cardiovascular: Negative for chest pain, palpitations and leg swelling. Gastrointestinal: Negative for abdominal pain, blood in stool, constipation, diarrhea, nausea and vomiting. Genitourinary: Negative for difficulty urinating, dysuria, flank pain, frequency and hematuria. Musculoskeletal: Negative for back pain. Neurological: Positive for dizziness. Negative for syncope, numbness and headaches. All other systems reviewed and are negative. Vitals:    08/21/17 1832 08/21/17 1918 08/21/17 1919   BP: 130/85 115/66    Pulse: 79  70   Resp: 16  14   Temp: 98.5 °F (36.9 °C)     SpO2: 100%     Weight: 58.2 kg (128 lb 4 oz)     Height: 5' 3\" (1.6 m)              Physical Exam   Constitutional: She is oriented to person, place, and time. She appears well-developed and well-nourished. No distress. HENT:   Head: Normocephalic and atraumatic. Nose: Nose normal.   Mouth/Throat: Oropharynx is clear and moist. No oropharyngeal exudate. Eyes: Conjunctivae and EOM are normal. Right eye exhibits no discharge. Left eye exhibits no discharge. No scleral icterus. Neck: Normal range of motion. Neck supple. No JVD present. No tracheal deviation present. No thyromegaly present. Cardiovascular: Normal rate, regular rhythm, normal heart sounds and intact distal pulses. Exam reveals no gallop and no friction rub. No murmur heard. Pulmonary/Chest: Effort normal and breath sounds normal. No stridor. No respiratory distress. She has no wheezes. She has no rales. She exhibits no tenderness. Abdominal: Bowel sounds are normal. She exhibits no distension and no mass. There is no tenderness. There is no rebound. Musculoskeletal: Normal range of motion. She exhibits no edema or tenderness. Lymphadenopathy:     She has no cervical adenopathy. Neurological: She is alert and oriented to person, place, and time. No cranial nerve deficit. Coordination normal.   Skin: Skin is warm and dry. No rash noted. She is not diaphoretic. No erythema. No pallor. Psychiatric: She has a normal mood and affect. Her behavior is normal. Judgment and thought content normal.   Note written by Courtney Lawton, as dictated by Daina Luis MD 8:09 PM      MDM  Number of Diagnoses or Management Options  Dizziness:      Amount and/or Complexity of Data Reviewed  Clinical lab tests: ordered and reviewed  Review and summarize past medical records: yes    Risk of Complications, Morbidity, and/or Mortality  Presenting problems: moderate  Diagnostic procedures: moderate  Management options: moderate    Patient Progress  Patient progress: stable    ED Course       Procedures    PROGRESS NOTE:  9:38 PM  Discussed lab results with the patient; they were negative. Will discharge home. 8:27 AM  The patient has been reevaluated. The patient is ready for discharge. The patient's signs, symptoms, diagnosis, and discharge instructions have been discussed and the patient/ family has conveyed their understanding. The patient is to follow up as recommended or return to the ED should their symptoms worsen. Plan has been discussed and the patient is in agreement. LABORATORY TESTS:  No results found for this or any previous visit (from the past 12 hour(s)). IMAGING RESULTS:  No orders to display     No results found. MEDICATIONS GIVEN:  Medications - No data to display    IMPRESSION:  1. Dizziness        PLAN:  1. There are no discharge medications for this patient.     2.   Follow-up Information     Follow up With Details Comments Dexter Osborne MD  If symptoms worsen [address] [address]  [phone]      [facility] EMERGENCY DEP  If symptoms worsen [address] [facility]  [phone]            Return to ED for new or worsening symptoms       Denton Bobby MD

## 2017-08-22 NOTE — ED NOTES
Assumed care of patient from [facility]. Patient resting on stretcher, NAD noted at this time. Dell Enrique MD at bedside to evaluate patient at this time. Bed low and locked, call bell in reach, family at the bedside. Will continue to monitor.

## 2017-08-23 ENCOUNTER — OFFICE VISIT (OUTPATIENT)
Dept: ENDOCRINOLOGY | Age: 37
End: 2017-08-23

## 2017-08-23 VITALS
BODY MASS INDEX: 22.29 KG/M2 | DIASTOLIC BLOOD PRESSURE: 63 MMHG | HEART RATE: 70 BPM | HEIGHT: 63 IN | WEIGHT: 125.8 LBS | SYSTOLIC BLOOD PRESSURE: 114 MMHG

## 2017-08-23 DIAGNOSIS — E05.90 SUBCLINICAL HYPERTHYROIDISM: Primary | ICD-10-CM

## 2017-08-23 DIAGNOSIS — E04.2 MULTINODULAR GOITER: ICD-10-CM

## 2017-08-23 DIAGNOSIS — R42 DIZZINESS: ICD-10-CM

## 2017-08-23 DIAGNOSIS — R13.10 DYSPHAGIA, UNSPECIFIED TYPE: ICD-10-CM

## 2017-08-23 RX ORDER — BIOTIN 1000 MCG
TABLET,CHEWABLE ORAL
COMMUNITY
End: 2019-12-22

## 2017-08-23 RX ORDER — GLUCOSAMINE SULFATE 1500 MG
POWDER IN PACKET (EA) ORAL DAILY
Status: ON HOLD | COMMUNITY
End: 2017-10-27

## 2017-08-23 NOTE — PROGRESS NOTES
Chief Complaint   Patient presents with    Thyroid Problem     pcp and pharmacy verified   Records since last visit reviewed. History of Present Illness: Tati Young is a 44 y.o. female here for follow up of hyperthyroidism and MNG. Pt notes that in 2015 in [facility] she was diagnosed with \"a thyroid issue\" she had a biopsy but was never treated for her \"thyroid problems\". She was having issues of fatigue, tired all the time, low mood, GI upset and hair loss. \"I had blood tests and was told I had a thyroid issue\". She had an ultrasound and biopsy, but there was no evidence of cancer. At our initial visit in May 2017 I sent pt for repeat FNA of dominate nodule in Left thyroid lobe and it was read as benign. I checked her TFTs and her TSH was 0.398 with FT4 of 1.18, TT3 129, there TRAb, TPO and TgAb were not elevated. Since her T3/T4 were normal and she had no other evidence of hyperthyroidism, her low TSH technically fits with subclinical hyperthyroidism, which it was felt did not meet criteria for anti-thyroid therapy. Since our last visit she saw her PCP for dizziness. She had MRI/MRA off of which was normal.  She notes she continues to have issues of dizziness \"constantly, I feel like I am nauseous and will pass out\". She was referred to a cardiologist and neurologist. She has a follow up with the neurologist pending soon and has the appointment with cardiology tomorrow. She notes that she will sometimes still have problems \"making the food go down\". She has never seen GI or had a swallow study. She will occasionally have palpitations, which occur when she has the dizziness. She denies CP, SOB. Pt was also sent for Thyroid US in April 2017 at [facility], the report shows a right thyroid nodule 9x5x8 mm and Left hypoechoic nodule measuring 1.9x1. 1x1.7 cm with internal septation.     Pt only speaks Sao Tomean, the history was taken with the help of [facility] #[id number]. Current Outpatient Prescriptions   Medication Sig    biotin 1,000 mcg chew Take  by mouth.  cholecalciferol (VITAMIN D3) 1,000 unit cap Take  by mouth daily.  metoprolol succinate (TOPROL-XL) 25 mg XL tablet TM 1 TA DI    meclizine (ANTIVERT) 25 mg tablet Take  by mouth three (3) times daily as needed. No current facility-administered medications for this visit. No Known Allergies  Review of Systems:  - Cardiovascular: no chest pain  - Neurological: no tremors  - Integumentary: skin is normal    Physical Examination:  Blood pressure 114/63, pulse 70, height 5' 3\" (1.6 m), weight 125 lb 12.8 oz (57.1 kg). - General: pleasant, no distress, good eye contact   - Neck: no thyromegaly or thyroid bruits  - Cardiovascular: regular, normal rate, nl s1 and s2, no m/r/g   - Integumentary: skin is normal, no edema  - Neurological: reflexes 2+ at biceps, no tremors  - Psychiatric: normal mood and affect    Data Reviewed:   - none new for review    Assessment/Plan:   1) Subclinical Hyperthyroidism > Pt is clinically euthyroid, will check her TFTs today to see if she has developed lillian hyperthyroidism. 2) Multinodular Goiter > The FNA of the dominate left thyroid nodule was benign. We can repeat the US in 6-12 months to look for evidence of change. 3) Dizziness > Will check metanephrine levels to see if that could be contributing to her dizziness    4) Dysphagia > Will order a modified barrium swallow to evaluate her dysphagia. Pt voices understanding and agreement with the plan. RTC 6 months      Follow-up Disposition:  Return in about 6 months (around 2/23/2018).     Copy sent to:  Dr. Dre Rockwell

## 2017-08-24 ENCOUNTER — OFFICE VISIT (OUTPATIENT)
Dept: CARDIOLOGY CLINIC | Age: 37
End: 2017-08-24

## 2017-08-24 VITALS
WEIGHT: 128 LBS | SYSTOLIC BLOOD PRESSURE: 120 MMHG | DIASTOLIC BLOOD PRESSURE: 70 MMHG | RESPIRATION RATE: 16 BRPM | OXYGEN SATURATION: 97 % | BODY MASS INDEX: 22.68 KG/M2 | HEIGHT: 63 IN | HEART RATE: 70 BPM

## 2017-08-24 DIAGNOSIS — R00.2 HEART PALPITATIONS: ICD-10-CM

## 2017-08-24 DIAGNOSIS — R42 DIZZINESS: ICD-10-CM

## 2017-08-24 DIAGNOSIS — R03.0 ELEVATED BP WITHOUT DIAGNOSIS OF HYPERTENSION: ICD-10-CM

## 2017-08-24 DIAGNOSIS — I49.9 IRREGULAR HEART RATE: Primary | ICD-10-CM

## 2017-08-24 DIAGNOSIS — R11.0 NAUSEA: ICD-10-CM

## 2017-08-24 NOTE — MR AVS SNAPSHOT
Visit Information Marcelinajourdan Briceño Personal Médico Departamento Teléfono del Dep. Número de visita 8/24/2017  3:40 PM Eliana Montero MD [facility] [address] [facility] [phone] [id number] Your Appointments 10/3/2017  3:20 PM  
[facility] Appt Note: f/u 8/24/2017 CW  
 [address] [address]  
[address]  
  
    
 2/14/2018 11:50 AM  
Follow Up with Tello Hammonds MD  
[facility]) Appt Note: f/u      thyroid           6 month  
 [address] [facility] [address] [phone] [facility] Upcoming Health Maintenance Date Due DTaP/Tdap/Td series (1 - Tdap) 8/8/2000 PAP AKA CERVICAL CYTOLOGY 8/8/2000 INFLUENZA AGE 9 TO ADULT 8/1/2017 [place]  Review Complete El: 8/24/2017 Por: Audrene Squibb, LPN A partir del:  8/24/2017 No Known Allergies Vacunas actuales Rollo Pupa No hay ninguna vacuna archivada. No revisadas esta visita You Were Diagnosed With   
  
 Wolcott Cable Irregular heart rate    -  Primary ICD-10-CM: I49.9 ICD-9-CM: 427.9 Partes vitales PS Pulso Resp [place] ( percentil de crecimiento) Peso (percentil de crecimiento) LMP (última vishal) 120/70 (BP 1 Location: Right arm, BP Patient Position: Standing) 70 16 5' 3\" (1.6 m) 128 lb (58.1 kg) 07/24/2017 SpO2 BMI (IMC) Estado obstétrico Estatus de tabaquísmo 97% 22.67 kg/m2 Having regular periods Never Smoker Historial de signos vitales BMI and BSA Data Body Mass Index Body Surface Area  
 22.67 kg/m 2 1.61 m 2 Preferred Pharmacy Pharmacy Name Phone [facility] [address] [facility] [address]  [place] [phone] Aggarwal lista de medicamentos actualizada Lista actualizada el: 8/24/17  4:25 PM.  Benjamín Cisneros use aggarwal lista de medicamentos más reciente. biotin 1,000 mcg Chew Take  by mouth.  
  
 meclizine 25 mg tablet También conocido kyle:  ANTIVERT Take  by mouth three (3) times daily as needed. VITAMIN D3 1,000 unit Cap Medicamento genérico:  cholecalciferol Take  by mouth daily. Hicimos lo siguiente AMB POC EKG ROUTINE W/ 12 LEADS, INTER & REP [03089 CPT(R)] Instrucciones para el Paciente Stop Toprol BP check in 1 week Introducing [facility] & HEALTH SERVICES! Abhiadean Saint introduces [employer] patient portal. Now you can access parts of your medical record, email your doctor's office, and request medication refills online. 1. In your internet browser, go to [email or web address] 2. Click on the First Time User? Click Here link in the Sign In box. You will see the New Member Sign Up page. 3. Enter your [employer] Access Code exactly as it appears below. You will not need to use this code after youve completed the sign-up process. If you do not sign up before the expiration date, you must request a new code. · [employer] Access Code: [email or web address] Expires: 11/12/2017 12:32 PM 
 
4. Enter the last four digits of your Social Security Number (xxxx) and Date of Birth (mm/dd/yyyy) as indicated and click Submit. You will be taken to the next sign-up page. 5. Create a The DelFin Projectt ID. This will be your [employer] login ID and cannot be changed, so think of one that is secure and easy to remember. 6. Create a [employer] password. You can change your password at any time. 7. Enter your Password Reset Question and Answer. This can be used at a later time if you forget your password. 8. Enter your e-mail address. You will receive e-mail notification when new information is available in [address] 9. Click Sign Up. You can now view and download portions of your medical record. 10. Click the Download Summary menu link to download a portable copy of your medical information. If you have questions, please visit the Frequently Asked Questions section of the [employer] website. Remember, [employer] is NOT to be used for urgent needs. For medical emergencies, dial 911. Now available from your iPhone and Android! Please provide this summary of care documentation to your next provider. Your primary care clinician is listed as Delmer Bundy. If you have any questions after today's visit, please call [phone].

## 2017-08-24 NOTE — PROGRESS NOTES
Chief Complaint   Patient presents with    Blurred Vision    Dizziness    Syncope    Shortness of Breath     Pt c/o the above symptoms X1-2 months however increasing within the last 2 weeks.

## 2017-08-24 NOTE — PROGRESS NOTES
Favian Crum     1980       Mattie Shields MD, [facility]  Date of Visit-8/24/2017   PCP is Judy Mak MD   [facility] [place]  [facility]  HPI:  Favian Crum is a 44 y.o. female     Referred from Neurology noting palpitations and vertigo. She reports persistent dizziness with accompanying nausea and intermittent palpitations for the last 2 months. She is on metoprolol for palpitations and elevated BP for the last year. She notes symptoms increase with standing. She also reports having difficulty sleeping and occasional bilateral hand numbness. She denies smoking. She is originally from [facility]. She does note history of asthma. She had normal echo back in [place]. Denies chest pain, edema, syncope or shortness of breath at rest, has no tachycardia, or sense of arrhythmia. Interpretor used #[id number]     EKG: sinus rhythm within normal limits    Assessment/Plan:       Symptoms of dizziness  -suspect may relate to betablocker  -may not really need this medication or any BP pill  -this is the first option  -I will stop metoprolol and in 1 week will return for BP check with nurse  -if symptoms resolve and BP okay then nothing further needs to be done    -if she remains symptomatic will get echo and 24 hour Holter monitor although I think she has had these before in [facility]  -if the BP remains up will pick a different BP pill    Future Appointments  Date Time Provider Danielle Anderson   9/6/2017 8:30 AM [facility] XR OP 2 [facility]. RAN'S    10/3/2017 3:20 PM [address] [place] [address]/ [place] [address]   2/14/2018 11:50 AM Douglas Bailey MD RDE [address]       Impression:   1. Irregular heart rate    2. Heart palpitations    3. Dizziness    4. Nausea    5. Elevated BP without diagnosis of hypertension         ROS-except as noted above. . A complete cardiac and respiratory are reviewed and negative except as above ; Resp-denies wheezing  or productive cough,. Const- No unusual weight loss or fever; Neuro-no recent seizure or CVA ; GI- No BRBPR, abdom pain, bloating ; - no  hematuria ROS- complete multisystem 11 ROS done and reviewed as pertinent     Past Medical History:   Diagnosis Date    Hypertension     Palpitations     Thyroid nodule       Social Hx= reports that she has never smoked. She has never used smokeless tobacco. She reports that she does not drink alcohol or use illicit drugs. family history includes Cancer in her maternal grandfather; Diabetes in her father; Hypertension in her father, maternal grandfather, and maternal grandmother; Kidney Disease in her maternal grandmother and mother; No Known Problems in her brother, paternal grandfather, paternal grandmother, and sister; Thyroid Disease in her cousin. There is no history of Thyroid Cancer. Exam and Labs:    Visit Vitals    /70 (BP 1 Location: Right arm, BP Patient Position: Standing)    Pulse 70    Resp 16    Ht 5' 3\" (1.6 m)    Wt 128 lb (58.1 kg)    LMP 07/24/2017    SpO2 97%    BMI 22.67 kg/m2      Constitutional:  NAD, comfortable  Head: NC,AT. Eyes: No scleral icterus. Neck:  Neck supple. No JVD present. Throat: moist mucous membranes. Chest: Effort normal & normal respiratory excursion . Neurological: alert, conversant and oriented . Skin: Skin is not cold. No obvious systemic rash noted. Not diaphoretic. No erythema. Psychiatric:  Grossly normal mood and affect. Behavior appears normal. Extremities:  no clubbing or cyanosis. Abdomen: non distended    Lungs:breath sounds normal. No stridor. distress, wheezes or  Rales. Heart:    normal rate, regular rhythm, normal S1, S2, no murmurs, rubs, clicks or gallops , PMI non displaced. Edema: Edema is none.      Wt Readings from Last 3 Encounters:   08/24/17 128 lb (58.1 kg)   08/23/17 125 lb 12.8 oz (57.1 kg)   05/15/17 126 lb 6.4 oz (57.3 kg)      BP Readings from Last 3 Encounters: 08/24/17 120/70   08/23/17 114/63   08/03/17 124/80        Current Outpatient Prescriptions   Medication Sig    biotin 1,000 mcg chew Take  by mouth.  cholecalciferol (VITAMIN D3) 1,000 unit cap Take  by mouth daily.  meclizine (ANTIVERT) 25 mg tablet Take  by mouth three (3) times daily as needed. No current facility-administered medications for this visit. Impression see above.     Written by Katharine Thayer, as dictated by Author MD Riley.

## 2017-08-25 LAB
METANEPH FREE SERPL-MCNC: 10 PG/ML (ref 0–62)
NORMETANEPHRINE SERPL-MCNC: 37 PG/ML (ref 0–145)
T3 SERPL-MCNC: 111 NG/DL (ref 71–180)
T4 FREE SERPL-MCNC: 1.18 NG/DL (ref 0.82–1.77)
TSH SERPL DL<=0.005 MIU/L-ACNC: 0.43 UIU/ML (ref 0.45–4.5)

## 2017-08-28 NOTE — PROGRESS NOTES
Her TFTs look ok and metanephrine levels were not elevated. No endocrine cause for her lightheadedness found. Pt mailed letter explaining the results.

## 2017-08-30 ENCOUNTER — OFFICE VISIT (OUTPATIENT)
Dept: CARDIOLOGY CLINIC | Age: 37
End: 2017-08-30

## 2017-08-30 VITALS — SYSTOLIC BLOOD PRESSURE: 110 MMHG | HEART RATE: 64 BPM | DIASTOLIC BLOOD PRESSURE: 80 MMHG

## 2017-08-30 DIAGNOSIS — G90.A POTS (POSTURAL ORTHOSTATIC TACHYCARDIA SYNDROME): Primary | ICD-10-CM

## 2017-08-30 NOTE — MR AVS SNAPSHOT
Visit Information Date & Time Provider Department Dept. Phone Encounter #  
 8/30/2017 10:40 AM Pauline Lobo MD [facility] [phone] [id number] Your Appointments 8/30/2017 10:40 AM  
ESTABLISHED PATIENT with Pauline Lobo MD  
[facility] ([address]) Appt Note: 1 Week follow up [address] [facility] [address] [place] [address]  
[address] [facility] [address]  
  
    
 10/3/2017  3:20 PM  
Follow Up with DO Abby De [facility] [address] [facility]) Appt Note: f/u 8/24/2017 CW  
 [address] [address]  
[address]  
  
    
 2/14/2018 11:50 AM  
Follow Up with Leyda Mackenzie MD  
[facility] [address] [place] [address]) Appt Note: f/u      thyroid           6 month  
 [address] [facility] [address] [phone] [address] [facility] Upcoming Health Maintenance Date Due DTaP/Tdap/Td series (1 - Tdap) 8/8/2000 PAP AKA CERVICAL CYTOLOGY 8/8/2000 INFLUENZA AGE 9 TO ADULT 8/1/2017 Allergies as of 8/30/2017  Review Complete On: 8/24/2017 By: Guillermo Hawkins LPN No Known Allergies Current Immunizations  Never Reviewed No immunizations on file. Not reviewed this visit Vitals LMP OB Status Smoking Status 07/24/2017 Having regular periods Never Smoker Your Updated Medication List  
  
   
This list is accurate as of: 8/30/17  7:13 AM.  Always use your most recent med list.  
  
  
  
  
 biotin 1,000 mcg Chew Take  by mouth.  
  
 meclizine 25 mg tablet Commonly known as:  ANTIVERT Take  by mouth three (3) times daily as needed. VITAMIN D3 1,000 unit Cap Generic drug:  cholecalciferol Take  by mouth daily. To-Do List   
 2017 8:30 AM  
  Appointment with Opal Zurita MD; SPEECH THERAPY RESOURCE [facility]; [facility] XR OP 2 at [address] ([phone]) Patient needs to register 30 minutes prior to exam.  1.  Adults: For Adults NPO is NOT Required 2.   to 6 months:  Nothing to eat or drink for 3 hours prior to the study. 3.  6 months to 1 year:  Nothing to eat or drink for 4 hours prior to the study. 4.  1 year to 4 years:  Nothing to eat or drink after midnight except for routine medications, if early morning study.  Otherwise, nothing to eat or drink 4 hours prior to study. 5.  5 years to 12 years:  Nothing to eat or drink after midnight except for routine medications, if early morning study.  Otherwise, nothing to eat or drink 6 hours prior to study.  6.  13 years to 18 years:  Nothing to eat or drink after midnight except for routing medications, if early morning study.  Otherwise, nothing to eat or drink 8 hours prior to study.  7.  You must bring a LIST or BAG of all current medication you are taking with you to your appointment. Introducing [facility] & HEALTH SERVICES! Uriah Angeles introduces [employer] patient portal. Now you can access parts of your medical record, email your doctor's office, and request medication refills online. 1. In your internet browser, go to [email or web address] 2. Click on the First Time User? Click Here link in the Sign In box. You will see the New Member Sign Up page. 3. Enter your [employer] Access Code exactly as it appears below. You will not need to use this code after youve completed the sign-up process. If you do not sign up before the expiration date, you must request a new code. · [employer] Access Code: [email or web address] Expires: 2017 12:32 PM 
 
4.  Enter the last four digits of your Social Security Number (xxxx) and Date of Birth (mm/dd/yyyy) as indicated and click Submit. You will be taken to the next sign-up page. 5. Create a [employer] ID. This will be your [employer] login ID and cannot be changed, so think of one that is secure and easy to remember. 6. Create a [employer] password. You can change your password at any time. 7. Enter your Password Reset Question and Answer. This can be used at a later time if you forget your password. 8. Enter your e-mail address. You will receive e-mail notification when new information is available in [address] 9. Click Sign Up. You can now view and download portions of your medical record. 10. Click the Download Summary menu link to download a portable copy of your medical information. If you have questions, please visit the Frequently Asked Questions section of the [employer] website. Remember, [employer] is NOT to be used for urgent needs. For medical emergencies, dial 911. Now available from your iPhone and Android! Please provide this summary of care documentation to your next provider. Your primary care clinician is listed as July Guo. If you have any questions after today's visit, please call [phone].

## 2017-08-30 NOTE — PROGRESS NOTES
Patient complain  of shortness of breath,palpitation and dizziness. Insist to see Jose Miguel Tolentino tomorrow.

## 2017-08-31 ENCOUNTER — OFFICE VISIT (OUTPATIENT)
Dept: CARDIOLOGY CLINIC | Age: 37
End: 2017-08-31

## 2017-08-31 VITALS
DIASTOLIC BLOOD PRESSURE: 80 MMHG | BODY MASS INDEX: 22.86 KG/M2 | HEIGHT: 63 IN | OXYGEN SATURATION: 98 % | WEIGHT: 129 LBS | HEART RATE: 70 BPM | SYSTOLIC BLOOD PRESSURE: 120 MMHG | RESPIRATION RATE: 16 BRPM

## 2017-08-31 DIAGNOSIS — R53.83 FATIGUE, UNSPECIFIED TYPE: ICD-10-CM

## 2017-08-31 DIAGNOSIS — Z78.9 BETA-BLOCKER INTOLERANCE: ICD-10-CM

## 2017-08-31 DIAGNOSIS — R00.2 PALPITATIONS: Primary | ICD-10-CM

## 2017-08-31 DIAGNOSIS — E05.90 SUBCLINICAL HYPERTHYROIDISM: ICD-10-CM

## 2017-08-31 RX ORDER — FLECAINIDE ACETATE 50 MG/1
50 TABLET ORAL 2 TIMES DAILY
Qty: 60 TAB | Refills: 6 | Status: SHIPPED | OUTPATIENT
Start: 2017-08-31 | End: 2017-10-02

## 2017-08-31 NOTE — PROGRESS NOTES
Tati Young     1980       Mattie Buck MD, [facility]  Date of Visit-8/31/2017   PCP is Brenda Polanco MD   [facility] [place]  [facility]  HPI:  Tati Young is a 44 y.o. female     Seen 1 week ago noting fatigue. We suggested she stop her betablocker. She was to get a nurse BP check today but called with symptoms and asked to be seen. Her vitals including BP are normal today. She states that her dizziness from before has improved significantly but is still mildly present. She now complains of intermittent palpitations that have increased in frequency and seem to occur when she rises from standing position or does activities such as cleaning or walking upstairs. She states her thyroid numbers have been normal. She also notes occasional SOB. Denies chest pain, edema, syncope   Interpretor used: #[id number] [employer]    EKG: normal sinus rhythm normal axis and intervals   Assessment/Plan:       Palpitations   -related to exertion  -I wonder if they may be related to thyroid  -it seemed only a little dose of metoprolol controlled this but led to dizziness  -I explained that we would try to find medication that would control palpitations but not give her that sense of being dizzy  -she is young and unlikely to have any CAD so will start flecainide 50 mg BID  -will check echo in 2 weeks and get 48 hour Holter today       Future Appointments  Date Time Provider Danielle Andersno   9/19/2017 1:00 PM [facility] XR IP 1 [facility]   9/22/2017 11:50 AM Nu Landis MD RDE [place] [facility]   10/3/2017 3:20 PM Griselda Maria, DO SENA GOODMAN SCHED       Impression:   1. Palpitations    2. Subclinical hyperthyroidism    3. Fatigue, unspecified type    4. Beta-blocker intolerance         ROS-except as noted above. . A complete cardiac and respiratory are reviewed and negative except as above ; Resp-denies wheezing  or productive cough,. Const- No unusual weight loss or fever; Neuro-no recent seizure or CVA ; GI- No BRBPR, abdom pain, bloating ; - no  hematuria   see supplement sheet, initialed and to be scanned by staff  Past Medical History:   Diagnosis Date    Hypertension     Palpitations     Thyroid nodule       Social Hx= reports that she has never smoked. She has never used smokeless tobacco. She reports that she does not drink alcohol or use illicit drugs. Exam and Labs:    Visit Vitals    /80 (BP 1 Location: Left arm, BP Patient Position: Sitting)    Pulse 70    Resp 16    Ht 5' 3\" (1.6 m)    Wt 129 lb (58.5 kg)    LMP 07/24/2017    SpO2 98%    BMI 22.85 kg/m2      Constitutional:  NAD, comfortable  Head: NC,AT. Eyes: No scleral icterus. Neck:  Neck supple. No JVD present. Throat: moist mucous membranes. Chest: Effort normal & normal respiratory excursion . Neurological: alert, conversant and oriented . Skin: Skin is not cold. No obvious systemic rash noted. Not diaphoretic. No erythema. Psychiatric:  Grossly normal mood and affect. Behavior appears normal. Extremities:  no clubbing or cyanosis. Abdomen: non distended    Lungs:breath sounds normal. No stridor. distress, wheezes or  Rales. Heart:    normal rate, regular rhythm, normal S1, S2, no murmurs, rubs, clicks or gallops , PMI non displaced. Edema: Edema is none. Wt Readings from Last 3 Encounters:   08/31/17 129 lb (58.5 kg)   08/24/17 128 lb (58.1 kg)   08/23/17 125 lb 12.8 oz (57.1 kg)      BP Readings from Last 3 Encounters:   08/31/17 120/80   08/30/17 110/80   08/24/17 120/70        Current Outpatient Prescriptions   Medication Sig    biotin 1,000 mcg chew Take  by mouth.  cholecalciferol (VITAMIN D3) 1,000 unit cap Take  by mouth daily. No current facility-administered medications for this visit. Impression see above.     Written by Yaquelin Ibarra, as dictated by Magy Lyons MD.

## 2017-08-31 NOTE — MR AVS SNAPSHOT
Visit Information Date & Time Provider Department Dept. Phone Encounter #  
 8/31/2017  8:40 AM Joanna Tijerina MD [facility] Kassie Matta [phone] [id number] Your Appointments 10/3/2017  3:20 PM  
Follow Up with DO Sarah Bonilla [facility] at [facility] [address] [facility]) Appt Note: f/u 8/24/2017 CW  
 [address] [facility] 207 [address] [facility] [address]  
[address]  
  
    
 2/14/2018 11:50 AM  
Follow Up with Christopher London MD  
[address] [facility] Diabetes and Endocrinology [address] [facility]) Appt Note: f/u      thyroid           6 month  
 [address] [facility] [address] [phone] 570 [facility] Upcoming Health Maintenance Date Due DTaP/Tdap/Td series (1 - Tdap) 8/8/2000 PAP AKA CERVICAL CYTOLOGY 8/8/2000 INFLUENZA AGE 9 TO ADULT 8/1/2017 Allergies as of 8/31/2017  Review Complete On: 8/31/2017 By: Sierra Walter No Known Allergies Current Immunizations  Never Reviewed No immunizations on file. Not reviewed this visit You Were Diagnosed With   
  
 Codes Comments Palpitations    -  Primary ICD-10-CM: R00.2 ICD-9-CM: 785.1 Vitals BP Pulse Resp Height(growth percentile) Weight(growth percentile) LMP  
 120/80 (BP 1 Location: Left arm, BP Patient Position: Sitting) 70 16 5' 3\" (1.6 m) 129 lb (58.5 kg) 07/24/2017 SpO2 BMI OB Status Smoking Status 98% 22.85 kg/m2 Having regular periods Never Smoker Vitals History BMI and BSA Data Body Mass Index Body Surface Area  
 22.85 kg/m 2 1.61 m 2 Preferred Pharmacy Pharmacy Name Phone Jammie Bocan[facility] [address] [employer] [facility] S[facility]  [place] [phone] Your Updated Medication List  
  
 This list is accurate as of: 17  9:22 AM.  Always use your most recent med list.  
  
  
  
  
 biotin 1,000 mcg Estuardo Most Take  by mouth. VITAMIN D3 1,000 unit Cap Generic drug:  cholecalciferol Take  by mouth daily. We Performed the Following AMB POC EKG ROUTINE  LEADS, INTER & REP [32983 CPT(R)] To-Do List   
 2017 8:30 AM  
  Appointment with Dawna Ortega MD; SPEECH THERAPY RESOURCE [facility]; [facility] XR OP 2 at [address] ([phone]) Patient needs to register 30 minutes prior to exam.  1.  Adults: For Adults NPO is NOT Required 2.   to 6 months:  Nothing to eat or drink for 3 hours prior to the study. 3.  6 months to 1 year:  Nothing to eat or drink for 4 hours prior to the study. 4.  1 year to 4 years:  Nothing to eat or drink after midnight except for routine medications, if early morning study.  Otherwise, nothing to eat or drink 4 hours prior to study. 5.  5 years to 12 years:  Nothing to eat or drink after midnight except for routine medications, if early morning study.  Otherwise, nothing to eat or drink 6 hours prior to study.  6.  13 years to 18 years:  Nothing to eat or drink after midnight except for routing medications, if early morning study.  Otherwise, nothing to eat or drink 8 hours prior to study.  7.  You must bring a LIST or BAG of all current medication you are taking with you to your appointment. Introducing [facility] SERVICES! [facility] introduces KSKT patient portal. Now you can access parts of your medical record, email your doctor's office, and request medication refills online. 1. In your internet browser, go to [email or web address] 2. Click on the First Time User? Click Here link in the Sign In box. You will see the New Member Sign Up page. 3. Enter your KSKT Access Code exactly as it appears below.  You will not need to use this code after youve completed the sign-up process. If you do not sign up before the expiration date, you must request a new code. · Metaboli Access Code: [email or web address] Expires: 11/12/2017 12:32 PM 
 
4. Enter the last four digits of your Social Security Number (xxxx) and Date of Birth (mm/dd/yyyy) as indicated and click Submit. You will be taken to the next sign-up page. 5. Create a Metaboli ID. This will be your Metaboli login ID and cannot be changed, so think of one that is secure and easy to remember. 6. Create a Metaboli password. You can change your password at any time. 7. Enter your Password Reset Question and Answer. This can be used at a later time if you forget your password. 8. Enter your e-mail address. You will receive e-mail notification when new information is available in [address] 9. Click Sign Up. You can now view and download portions of your medical record. 10. Click the Download Summary menu link to download a portable copy of your medical information. If you have questions, please visit the Frequently Asked Questions section of the Metaboli website. Remember, Metaboli is NOT to be used for urgent needs. For medical emergencies, dial 911. Now available from your iPhone and Android! Please provide this summary of care documentation to your next provider. Your primary care clinician is listed as Torrie Serrano. If you have any questions after today's visit, please call [phone].

## 2017-09-13 ENCOUNTER — CLINICAL SUPPORT (OUTPATIENT)
Dept: CARDIOLOGY CLINIC | Age: 37
End: 2017-09-13

## 2017-09-13 DIAGNOSIS — R00.2 PALPITATIONS: Primary | ICD-10-CM

## 2017-09-13 DIAGNOSIS — G43.109 MIGRAINE WITH VERTIGO: ICD-10-CM

## 2017-09-13 DIAGNOSIS — R42 DIZZY: ICD-10-CM

## 2017-09-17 PROBLEM — Z78.9 BETA-BLOCKER INTOLERANCE: Status: ACTIVE | Noted: 2017-09-17

## 2017-09-21 NOTE — PROGRESS NOTES
Echo normal  Keep fu with loop results to be called once available  How is she doing on the flecainide  If she is taking it then needs EKG and OV in 2-3 weeks

## 2017-09-22 ENCOUNTER — TELEPHONE (OUTPATIENT)
Dept: CARDIOLOGY CLINIC | Age: 37
End: 2017-09-22

## 2017-09-22 ENCOUNTER — OFFICE VISIT (OUTPATIENT)
Dept: ENDOCRINOLOGY | Age: 37
End: 2017-09-22

## 2017-09-22 VITALS
HEART RATE: 77 BPM | HEIGHT: 63 IN | DIASTOLIC BLOOD PRESSURE: 72 MMHG | SYSTOLIC BLOOD PRESSURE: 125 MMHG | WEIGHT: 128.8 LBS | BODY MASS INDEX: 22.82 KG/M2

## 2017-09-22 DIAGNOSIS — R13.10 DYSPHAGIA, UNSPECIFIED TYPE: ICD-10-CM

## 2017-09-22 DIAGNOSIS — E05.90 SUBCLINICAL HYPERTHYROIDISM: Primary | ICD-10-CM

## 2017-09-22 DIAGNOSIS — E04.2 MULTINODULAR GOITER: ICD-10-CM

## 2017-09-22 NOTE — MR AVS SNAPSHOT
Visit Information Date & Time Provider Department Dept. Phone Encounter #  
 9/22/2017 11:50 AM Karen Ernst Williamortiz 346 Diabetes and Endocrinology [id number] Follow-up Instructions Return in about 6 months (around 3/22/2018). Your Appointments 10/3/2017  3:20 PM  
Follow Up with [address] [place] [address] Neurology Clinic at [place] [address] [facility]) Appt Note: f/u 8/24/2017 [address] [address]  
[phone]  
  
   
 [address]  [address] Upcoming Health Maintenance Date Due DTaP/Tdap/Td series (1 - Tdap) 8/8/2000 PAP AKA CERVICAL CYTOLOGY 8/8/2000 INFLUENZA AGE 9 TO ADULT 8/1/2017 Allergies as of 9/22/2017  Review Complete On: 9/22/2017 By: Hugo Morales LPN No Known Allergies Current Immunizations  Never Reviewed No immunizations on file. Not reviewed this visit You Were Diagnosed With   
  
 Codes Comments Subclinical hyperthyroidism    -  Primary ICD-10-CM: E05.90 ICD-9-CM: 242.90 Multinodular goiter     ICD-10-CM: E04.2 ICD-9-CM: 002. 1 Dysphagia, unspecified type     ICD-10-CM: R13.10 ICD-9-CM: 787.20 Vitals BP Pulse Height(growth percentile) Weight(growth percentile) LMP BMI  
 125/72 77 5' 3\" (1.6 m) 128 lb 12.8 oz (58.4 kg) 07/24/2017 22.82 kg/m2 OB Status Smoking Status Having regular periods Never Smoker BMI and BSA Data Body Mass Index Body Surface Area  
 22.82 kg/m 2 1.61 m 2 Preferred Pharmacy Pharmacy Name Phone Jammie [address] [employer] [address] Mancia  [place] [phone] Your Updated Medication List  
  
   
This list is accurate as of: 9/22/17 12:22 PM.  Always use your most recent med list.  
  
  
  
  
 biotin 1,000 mcg Estuardo Most Take  by mouth. flecainide 50 mg tablet Commonly known as:  TAMBOCOR Take 1 Tab by mouth two (2) times a day. VITAMIN D3 1,000 unit Cap Generic drug:  cholecalciferol Take  by mouth daily. Follow-up Instructions Return in about 6 months (around 3/22/2018). To-Do List   
 03/12/2018 Imaging:  US THYROID/PARATHYROID/SOFT TISS Introducing [facility] & HEALTH SERVICES! [employer] introduces Vriti Inf[employer] patient portal. Now you can access parts of your medical record, email your doctor's office, and request medication refills online. 1. In your internet browser, go to [email or web address] Core [email or web address]/[employer] 2. Click on the First Time User? Click Here link in the Sign In box. You will see the New Member Sign Up page. 3. Enter your Vriti [employer] Access Code exactly as it appears below. You will not need to use this code after youve completed the sign-up process. If you do not sign up before the expiration date, you must request a new code. · Vriti [employer]ocom Access Code: [email or web address] Expires: 10/30/2017  2:30 PM 
 
4. Enter the last four digits of your Social Security Number (xxxx) and Date of Birth (mm/dd/yyyy) as indicated and click Submit. You will be taken to the next sign-up page. 5. Create a Vriti Infocom ID. This will be your Vriti Infocom login ID and cannot be changed, so think of one that is secure and easy to remember. 6. Create a Vriti Infocom password. You can change your password at any time. 7. Enter your Password Reset Question and Answer. This can be used at a later time if you forget your password. 8. Enter your e-mail address. You will receive e-mail notification when new information is available in [address] 9. Click Sign Up. You can now view and download portions of your medical record. 10. Click the Download Summary menu link to download a portable copy of your medical information.  
 
If you have questions, please visit the Frequently Asked Questions section of the [employer]. Remember, SocialWirehart is NOT to be used for urgent needs. For medical emergencies, dial 911. Now available from your iPhone and Android! Please provide this summary of care documentation to your next provider. Your primary care clinician is listed as David Chase. If you have any questions after today's visit, please call [phone].

## 2017-09-22 NOTE — TELEPHONE ENCOUNTER
----- Message from Guera Moon MD sent at 9/21/2017  7:25 PM EDT -----  Echo normal  Keep fu with loop results to be called once available  How is she doing on the flecainide  If she is taking it then needs EKG and OV in 2-3 weeks      Patient states she will call back later with a . She does not speak English very well.    2 pt identifiers used

## 2017-09-22 NOTE — PROGRESS NOTES
Chief Complaint   Patient presents with    Thyroid Problem     pcp and pharmacy verified   Records since last visit reviewed. History of Present Illness: Silvana Chapman is a 44 y.o. female here for follow up of Subclinical hyperthyroidism and MNG. Pt notes that in 2015 in [facility] she was diagnosed with \"a thyroid issue\" she had a biopsy but was never treated for her \"thyroid problems\". She was having issues of fatigue, tired all the time, low mood, GI upset and hair loss. \"I had blood tests and was told I had a thyroid issue\". She had an ultrasound and biopsy, but there was no evidence of cancer. At our initial visit in May 2017 I sent pt for repeat FNA of dominate nodule in Left thyroid lobe and it was read as benign. I checked her TFTs and her TSH was 0.398 with FT4 of 1.18, TT3 129, there TRAb, TPO and TgAb were not elevated. Since her T3/T4 were normal and she had no other evidence of hyperthyroidism, her low TSH technically fits with subclinical hyperthyroidism, which it was felt did not meet criteria for anti-thyroid therapy. At our last visit in August 2017 she was reporting issues of dizziness. She saw her PCP for dizziness who ordered an MRI/MRA which was normal.  She was referred to neurology and cardiology. She saw Dr. Louise Loco of Cardiology in September. He checked an Echo, which was normal. He is planning for a Holter monitor, and based on the results he plans to start Flecaninde. Pt notes the dizziness is less frequent and less severe. She notes that when she stopped the Metoprolol the dizziness improved. She saw Dr. Justin Barkley of neurology and repeat MRI did not show evidence of MS and was normal. She has follow up on 10/3/17. She is scheduled for the swallow study next week to evaluate her dysphagia. I rechecked her TFTs her TSH was 0.433 with FT4 of 1.18 and TT3 of 111.  I checked metanephrine levels as well, which were normal.    She will occasionally have palpitations, if she stands up too quickly or if she is very active. She denies CP, SOB. Pt was also sent for Thyroid US in April 2017 at [facility], the report shows a right thyroid nodule 9x5x8 mm and Left hypoechoic nodule measuring 1.9x1. 1x1.7 cm with internal septation. In May 2017 I sent her for FNA which came back benign. Pt only speaks Amharic, the history was taken with the help of Renard Bone #[id number]. Current Outpatient Prescriptions   Medication Sig    biotin 1,000 mcg chew Take  by mouth.  cholecalciferol (VITAMIN D3) 1,000 unit cap Take  by mouth daily.  flecainide (TAMBOCOR) 50 mg tablet Take 1 Tab by mouth two (2) times a day. No current facility-administered medications for this visit. No Known Allergies  Review of Systems:  - Cardiovascular: no chest pain  - Neurological: no tremors  - Integumentary: skin is normal    Physical Examination:  Blood pressure 125/72, pulse 77, height 5' 3\" (1.6 m), weight 128 lb 12.8 oz (58.4 kg), last menstrual period 07/24/2017.  - General: pleasant, no distress, good eye contact   - Neck: no thyromegaly or thyroid bruits  - Cardiovascular: regular, normal rate, nl s1 and s2, no m/r/g   - Integumentary: skin is normal, no edema  - Neurological: reflexes 2+ at biceps, no tremors  - Psychiatric: normal mood and affect    Data Reviewed:   - none new for review    Assessment/Plan:   1) Subclinical Hyperthyroidism > Pt is clinically euthyroid, her TFTs were ok in August 2017. 2) Multinodular Goiter > The FNA of the dominate left thyroid nodule was benign. Will repeat the thyroid US before our next visit. 3) Dysphagia > Will order a modified barrium swallow to evaluate her dysphagia. Pt voices understanding and agreement with the plan. RTC 6 months    We spent 40 minutes of face to face time together and > 50% of the time was spent in counseling on the above issues.          Follow-up Disposition:  Return in about 6 months (around 3/22/2018).     Copy sent to:  Dr. Amy Tavares

## 2017-09-25 NOTE — TELEPHONE ENCOUNTER
Verified patient with two types of identifiers. Contacted patient with [facility]  and notified of results. She is not currently taking the flecainide and has not felt any different from being off of the metoprolol and does not think this was the cause of her issues. She wants to know what to do at this point.   Will check with MD and contact back with  per her request.

## 2017-09-26 ENCOUNTER — TELEPHONE (OUTPATIENT)
Dept: CARDIOLOGY CLINIC | Age: 37
End: 2017-09-26

## 2017-09-26 NOTE — TELEPHONE ENCOUNTER
2 identifiers. I spoke to patient. She is complaining of dizziness,palpitations and SOB when she stands up. This started 1 week ago. Some time ago all her medications were stopped. She felt fine being off the meds until 1 week ago. Made an appointment for patient to be seen on Monday. She states that she has not fainted/passed out. Recommended she go to the ER if her symptoms get worse or if she passes out. She voiced understanding. It was very hard to communicate with the patient because her [facility] is very poor.

## 2017-10-02 ENCOUNTER — OFFICE VISIT (OUTPATIENT)
Dept: CARDIOLOGY CLINIC | Age: 37
End: 2017-10-02

## 2017-10-02 VITALS
DIASTOLIC BLOOD PRESSURE: 70 MMHG | HEIGHT: 63 IN | WEIGHT: 129.4 LBS | BODY MASS INDEX: 22.93 KG/M2 | SYSTOLIC BLOOD PRESSURE: 118 MMHG | HEART RATE: 82 BPM

## 2017-10-02 DIAGNOSIS — R06.09 DOE (DYSPNEA ON EXERTION): ICD-10-CM

## 2017-10-02 DIAGNOSIS — Z78.9 BETA-BLOCKER INTOLERANCE: ICD-10-CM

## 2017-10-02 DIAGNOSIS — E05.90 SUBCLINICAL HYPERTHYROIDISM: ICD-10-CM

## 2017-10-02 DIAGNOSIS — R00.0 TACHYCARDIA: Primary | ICD-10-CM

## 2017-10-02 RX ORDER — DILTIAZEM HYDROCHLORIDE 120 MG/1
120 CAPSULE, COATED, EXTENDED RELEASE ORAL DAILY
Qty: 30 CAP | Refills: 6 | Status: SHIPPED | OUTPATIENT
Start: 2017-10-02 | End: 2017-10-09

## 2017-10-02 NOTE — PROGRESS NOTES
Per Dr Loreta Shaw discontinued all medications not taken. Gave copies of echo and holter to patient per her request.     Faxed order to sleep center at fax number [phone]. Fax confirmation received.

## 2017-10-02 NOTE — MR AVS SNAPSHOT
Visit Information Date & Time Provider Department Dept. Phone Encounter #  
 10/2/2017  9:00 AM Emily Floyd MD [facility] Judy Kelley [phone] [id number] Your Appointments 10/3/2017  3:20 PM  
Follow Up with 812 m DO Mary Anne Elliott Neurology Clinic at [address] [place] [address]) Appt Note: f/u 8/24/2017 CW  
 620 [facility] 207 [facility] [address] [place] [address]  
[address]  
  
    
 3/23/2018 11:50 AM  
Follow Up with Kole Gauthier MD  
[facility] Diabetes and Endocrinology [address] [place] [address]) Appt Note: 6 month f/u  
 305 [facility] [address] [phone] 570 [facility] Upcoming Health Maintenance Date Due DTaP/Tdap/Td series (1 - Tdap) 8/8/2000 PAP AKA CERVICAL CYTOLOGY 8/8/2000 INFLUENZA AGE 9 TO ADULT 8/1/2017 Allergies as of 10/2/2017  Review Complete On: 10/2/2017 By: Johanna Moore RN No Known Allergies Current Immunizations  Never Reviewed No immunizations on file. Not reviewed this visit You Were Diagnosed With   
  
 Codes Comments Tachycardia    -  Primary ICD-10-CM: R00.0 ICD-9-CM: 461. 0 Vitals BP Pulse Height(growth percentile) Weight(growth percentile) BMI OB Status 118/70 (BP 1 Location: Left arm, BP Patient Position: Sitting) 82 5' 3\" (1.6 m) 129 lb 6.4 oz (58.7 kg) 22.92 kg/m2 Having regular periods Smoking Status Never Smoker Vitals History BMI and BSA Data Body Mass Index Body Surface Area  
 22.92 kg/m 2 1.62 m 2 Preferred Pharmacy Pharmacy Name Phone Jammie Bocanegra [address] [employer]  [place] [phone] Your Updated Medication List  
  
   
 This list is accurate as of: 10/2/17  9:37 AM.  Always use your most recent med list.  
  
  
  
  
 biotin 1,000 mcg Henrique Fix Take  by mouth. dilTIAZem  mg ER capsule Commonly known as:  CARDIZEM CD Take 1 Cap by mouth daily. VITAMIN D3 1,000 unit Cap Generic drug:  cholecalciferol Take  by mouth daily. Prescriptions Sent to Pharmacy Refills  
 dilTIAZem CD (CARDIZEM CD) 120 mg ER capsule 6 Sig: Take 1 Cap by mouth daily. Class: Normal  
 Pharmacy: [facility] [address] #: [phone] Route: Oral  
  
We Performed the Following REFERRAL TO SLEEP STUDIES [REF99 Custom] Referral Information Referral ID Referred By Referred To  
  
 [id number] Delmi Leblanc Not Available Visits Status Start Date End Date 1 New Request 10/2/17 10/2/18 If your referral has a status of pending review or denied, additional information will be sent to support the outcome of this decision. Patient Instructions Start Diltiazem 120mg every day You will be referred for a sleep study. You will need to follow up in clinic with Dr. Ana Jacob in 4 months. Introducing [facility] & HEALTH SERVICES! Mert Bailey introduces [employer] patient portal. Now you can access parts of your medical record, email your doctor's office, and request medication refills online. 1. In your internet browser, go to [email or web address]://[email or web address]. Filip [email or web address]/[employer] 2. Click on the First Time User? Click Here link in the Sign In box. You will see the New Member Sign Up page. 3. Enter your [employer] Access Code exactly as it appears below. You will not need to use this code after youve completed the sign-up process. If you do not sign up before the expiration date, you must request a new code. · [employer] Access Code: [email or web address] Expires: 10/30/2017  2:30 PM 
 
 4. Enter the last four digits of your Social Security Number (xxxx) and Date of Birth (mm/dd/yyyy) as indicated and click Submit. You will be taken to the next sign-up page. 5. Create a CTERA Networks ID. This will be your [employer] login ID and cannot be changed, so think of one that is secure and easy to remember. 6. Create a [employer] password. You can change your password at any time. 7. Enter your Password Reset Question and Answer. This can be used at a later time if you forget your password. 8. Enter your e-mail address. You will receive e-mail notification when new information is available in [address] 9. Click Sign Up. You can now view and download portions of your medical record. 10. Click the Download Summary menu link to download a portable copy of your medical information. If you have questions, please visit the Frequently Asked Questions section of the [employer] website. Remember, [employer] is NOT to be used for urgent needs. For medical emergencies, dial 911. Now available from your iPhone and Android! Please provide this summary of care documentation to your next provider. Your primary care clinician is listed as Jaime Mtz. If you have any questions after today's visit, please call [phone].

## 2017-10-02 NOTE — PATIENT INSTRUCTIONS
Start Diltiazem 120mg every day     You will be referred for a sleep study. You will need to follow up in clinic with Dr. Arora  in 4 months.

## 2017-10-06 ENCOUNTER — TELEPHONE (OUTPATIENT)
Dept: CARDIOLOGY CLINIC | Age: 37
End: 2017-10-06

## 2017-10-06 NOTE — TELEPHONE ENCOUNTER
Please call patient regarding diltiazem. She stated that she is experiencing headaches and her bp is low. She would like to discuss dosage, she can be reached at [phone].  Thank you

## 2017-10-09 NOTE — TELEPHONE ENCOUNTER
Verified patient with two types of identifiers. Called to see how she is doing and her BP 90/69. She feels a little better. Told to keep appt for tomorrow to see Dr Rosy Davis. Patient verbalizes understanding. And will call with any questions or concerns.

## 2017-10-10 ENCOUNTER — OFFICE VISIT (OUTPATIENT)
Dept: CARDIOLOGY CLINIC | Age: 37
End: 2017-10-10

## 2017-10-10 VITALS
BODY MASS INDEX: 22.75 KG/M2 | DIASTOLIC BLOOD PRESSURE: 78 MMHG | SYSTOLIC BLOOD PRESSURE: 110 MMHG | HEART RATE: 74 BPM | WEIGHT: 128.4 LBS | OXYGEN SATURATION: 99 %

## 2017-10-10 DIAGNOSIS — R42 DIZZY: Primary | ICD-10-CM

## 2017-10-10 DIAGNOSIS — Z78.9 BETA-BLOCKER INTOLERANCE: ICD-10-CM

## 2017-10-10 DIAGNOSIS — R00.0 RAPID RESTING HEART RATE: ICD-10-CM

## 2017-10-10 DIAGNOSIS — Z01.812 PRE-PROCEDURE LAB EXAM: ICD-10-CM

## 2017-10-10 NOTE — MR AVS SNAPSHOT
Visit Information Yeimy Tran y Hortencia Personal Médico Departamento Teléfono del Dep. Número de visita 10/10/2017  4:40 PM Hassel Collet, MD [facility] [phone] Your Appointments 10/31/2017  3:20 PM  
Follow Up with [address] [place], [place] [address]) Appt Note: f/u 8/24/2017 CW; f/u 8/24/2017 CW r/s $0CP yd 10/03/17 [address] [place] [id number]  
[phone]  
  
   
 [address] [address]  
  
    
 11/30/2017  8:20 AM  
ESTABLISHED PATIENT with Hassel Collet, MD  
[facility] ([facility]) Appt Note: 2 week f/u tilt table [address] [place] [address]  
[address] [facility] [address]  
  
    
 12/26/2017 11:40 AM  
New Patient with Khloe Fish MD  
3240 [facility] ([facility]) Appt Note: NP refd by Dr. Helen Schuler for tachycardia_ [facility] [address] PHONE NEEDED)  
 217 [facility] 25 [facility] [address] [id number] [phone]  
  
   
 [address] [place] [address] [address] [place] [phone]  
  
    
 2/5/2018 11:20 AM  
ESTABLISHED PATIENT with Helen Schuler MD  
[facility] ([facility] SCHEDULING) Appt Note: 4 month f/u  
 [address] [facility] [address]  
[address] 1000 [facility]  
  
    
 3/23/2018 11:50 AM  
Follow Up with Yvon Burch MD  
[facility]) Appt Note: 6 month f/u  
 [address] [address] 570 [facility] Upcoming Health Maintenance Date Due DTaP/Tdap/Td series (1 - Tdap) 8/8/2000 PAP AKA CERVICAL CYTOLOGY 8/8/2000 INFLUENZA AGE 9 TO ADULT 8/1/2017 Alergias  Review Complete El: 10/10/2017 Por: Jaimee Contreras MD  
 [facility] del:  10/10/2017 No Known Allergies Vacunas actuales [place] Jailene No hay ninguna vacuna archivada. No revisadas esta visita You Were Diagnosed With   
  
 Alexa Galan Dizzy    -  Primary ICD-10-CM: S79 ICD-9-CM: 780.4 Beta-blocker intolerance     ICD-10-CM: Z78.9 ICD-9-CM: 995.27 Rapid resting heart rate     ICD-10-CM: R00.0 ICD-9-CM: 785.0 Pre-procedure lab exam     ICD-10-CM: X50.931 ICD-9-CM: V72.63 Partes vitales PS Pulso Peso (percentil de crecimiento) SpO2 BMI ([facility]) Estado obstétrico  
 110/78 (BP 1 Location: Right arm, BP Patient Position: Sitting) 74 128 lb 6.4 oz (58.2 kg) 99% 22.75 kg/m2 Having regular periods Estatus de tabaquísmo Never Smoker Historial de signos vitales BMI and BSA Data Body Mass Index Body Surface Area 22.75 kg/m 2 1.61 m 2 [facility] Name Phone [address] [facility] [address] [place]  [place] [phone] Aggarwal lista de medicamentos actualizada Lista actualizada el: 10/10/17  5:01 PM.  Steven Nguyen use aggarwal lista de medicamentos más reciente. biotin 1,000 mcg Chew Take  by mouth. VITAMIN D3 1,000 unit Cap Medicamento genérico:  cholecalciferol Take  by mouth daily. Hicimos lo siguiente CBC WITH AUTOMATED DIFF [80701 CPT(R)] METABOLIC PANEL, BASIC [62967 CPT(R)] Instrucciones para el Paciente Your Tilt Table procedure has been scheduled for 11/17/17 at 11:00 am, at [facility]. 
 
Please report to Admitting Department by 9:00 am, or 2 hours prior to your scheduled procedure. Please bring a list of your current medications and medication bottles, if able, to the hospital on this day.   You will be unable to drive after your procedure so please make sure to bring someone with you to your procedure. You will need to have nothing to eat or drink after midnight, the night prior to your procedure. You may have small sips of water, if needed, to take with your medication. You will need labs drawn prior to your procedure. Please go to [facility] to have this done no sooner than 10/17/17 and no later than 11/10/17. You should not stop your medication. After your procedure, you will need to follow up with Dr. Cornelious Felty. Your follow-up appointment has been scheduled for 11/30/17 at 8:20 am.  
 
 
 
 
 
 
 
  
Introducing [facility] HE[facility] SERVICES! Rajendra Harrison introduce portal paciente MyChart . Ahora se puede acceder a partes de glass expediente médico, enviar por correo electrónico la oficina de glass médico y solicitar renovaciones de medicamentos en línea. En glass navegador de Internet , Magan Due a [email or web address] Shyanne clic en el usuario por Deepak Baijoyce? Paul Kroner clic aquí en la sesión Pamalee Halo. Verá la página de registro [place]. Ingrese glass código de [employer] diamond y kyle aparece a continuación. Usted no tendrá que [employer] código después de jackie completado el proceso de registro . Si usted no se inscribe antes de la fecha de caducidad , debe solicitar un nuevo código. · MyChart Código de acceso : [id number] Expires: 10/30/2017  2:30 PM 
 
Ingresa los últimos cuatro dígitos de glass Número de Seguro Social ( xxxx ) y fecha de nacimiento ( dd / mm / aaaa ) kyle se indica y shyanne clic en Enviar. Usted será llevado a la siguiente página de registro . Crear un ID MyChart . Esta será glass ID de inicio de sesión de MyChart y no puede ser [place] , por lo que pensar en buck que es Chente Sari y fácil de recordar . Crear buck contraseña MyChart . Usted puede cambiar glass contraseña en cualquier momento . Ingrese glass Password Reset de preguntas y Espino .  Rapids se puede utilizar en un momento posterior si usted olvida glass contraseña. Introduzca glass dirección de correo electrónico . Dock [place] recibirá buck notificación por correo electrónico cuando la nueva información está disponible en MyChart . Cassie de guzmanic en Registrarse. Irl Vale marija y descargar porciones de glass expediente médico. 
Alisson clic en el enlace de descarga del menú Resumen para descargar buck copia portátil de glass información médica . Si tiene [employer] , por favor visite la sección de preguntas frecuentes del sitio web MyChart . Recuerde, VTX Technologyhart NO es que se utilizará para las necesidades urgentes. Para emergencias médicas , llame al 911 . Ahora disponible en glass iPhone y Android ! Por favor proporcione omari resumen de la documentación de cuidado a glass próximo proveedor. Your primary care clinician is listed as Benito Torres. If you have any questions after today's visit, please call [phone].

## 2017-10-10 NOTE — PATIENT INSTRUCTIONS
Your Tilt Table procedure has been scheduled for 11/17/17 at 11:00 am, at [facility].    Please report to Admitting Department by 9:00 am, or 2 hours prior to your scheduled procedure. Please bring a list of your current medications and medication bottles, if able, to the hospital on this day. You will be unable to drive after your procedure so please make sure to bring someone with you to your procedure. You will need to have nothing to eat or drink after midnight, the night prior to your procedure. You may have small sips of water, if needed, to take with your medication. You will need labs drawn prior to your procedure. Please go to [facility] to have this done no sooner than 10/17/17 and no later than 11/10/17. You should not stop your medication. After your procedure, you will need to follow up with Dr. Minor Gonzales.  Your follow-up appointment has been scheduled for 11/30/17 at 8:20 am.

## 2017-10-10 NOTE — PROGRESS NOTES
Cardiac Electrophysiology Office Consultation Note     Subjective:      Silvia Kelley is a 44 y.o. patient who is seen for evaluation of Palpitations, dizziness when she stands up  She gets shortness of breaths with this  She could not take beta blocker due to fatigue  cardizem bottomed out BP  She has stopped it  Echo was normal.  ECG normal  holter sinus tach at times to 143 bpm, PVCs and PAC       Patient Active Problem List   Diagnosis Code    Subclinical hyperthyroidism E05.90    Multinodular goiter E04.2    Beta-blocker intolerance Z78.9     Current Outpatient Prescriptions   Medication Sig Dispense Refill    biotin 1,000 mcg chew Take  by mouth.  cholecalciferol (VITAMIN D3) 1,000 unit cap Take  by mouth daily. No Known Allergies  Past Medical History:   Diagnosis Date    Hypertension     Multiple thyroid nodules     Palpitations     Thyroid nodule      No past surgical history   Family History   Problem Relation Age of Onset    Kidney Disease Mother      MARCUS    Hypertension Father     Diabetes Father     Kidney Disease Maternal Grandmother     Hypertension Maternal Grandmother     Cancer Maternal Grandfather      sto,acj    Hypertension Maternal Grandfather     No Known Problems Paternal Grandmother     No Known Problems Paternal Grandfather     No Known Problems Sister     No Known Problems Brother     Thyroid Disease Cousin      x4    Thyroid Cancer Neg Hx      Social History   Substance Use Topics    Smoking status: Never Smoker    Smokeless tobacco: Never Used    Alcohol use No        Review of Systems:   Constitutional: Negative for fever, chills, weight loss, malaise/fatigue. HEENT: Negative for nosebleeds, vision changes. Respiratory: Negative for cough, hemoptysis  Cardiovascular: Negative for chest pain, + palpitations, no orthopnea, claudication, leg swelling, syncope, and PND.    Gastrointestinal: Negative for nausea, vomiting, diarrhea, blood in stool and melena. Genitourinary: Negative for dysuria, and hematuria. Musculoskeletal: Negative for myalgias, arthralgia. Skin: Negative for rash. Heme: Does not bleed or bruise easily. Neurological: Negative for speech change and focal weakness     Objective:     Visit Vitals    /78 (BP 1 Location: Right arm, BP Patient Position: Sitting)    Pulse 74    Wt 128 lb 6.4 oz (58.2 kg)    SpO2 99%    BMI 22.75 kg/m2      Physical Exam:   Constitutional: well-developed and well-nourished. No respiratory distress. Head: Normocephalic and atraumatic. Eyes: Pupils are equal, round  ENT: hearing normal  Neck: supple. No JVD present. Cardiovascular: Normal rate, regular rhythm. Exam reveals no gallop and no friction rub. No murmur heard. Pulmonary/Chest: Effort normal and breath sounds normal. No wheezes. Abdominal: Soft, no tenderness. Musculoskeletal: no edema. Neurological: alert,oriented. Skin: Skin is warm and dry  Psychiatric: normal mood and affect. Behavior is normal. Judgment and thought content normal.         Assessment/Plan:       ICD-10-CM ICD-9-CM    1. Dizzy R42 780.4    2. Beta-blocker intolerance Z78.9 995.27    3. Rapid resting heart rate R00.0 785.0      reviewed diet, exercise and weight control  reviewed medications and side effects in detail   We discussed tilt table test and based on this BP and HR change and rhythm I will then discuss with her management  I have used 's service and she agrees to proceed    Thank you for involving me in this patient's care and please call with further concerns or questions. Frank Castillo M.D.   Electrophysiology/Cardiology  [facility] [place]  [address] , [address]  [address]  [phone]

## 2017-10-20 ENCOUNTER — TELEPHONE (OUTPATIENT)
Dept: CARDIOLOGY CLINIC | Age: 37
End: 2017-10-20

## 2017-10-20 RX ORDER — SODIUM CHLORIDE 0.9 % (FLUSH) 0.9 %
5-10 SYRINGE (ML) INJECTION AS NEEDED
Status: CANCELLED | OUTPATIENT
Start: 2017-10-20

## 2017-10-20 RX ORDER — SODIUM CHLORIDE 0.9 % (FLUSH) 0.9 %
5-10 SYRINGE (ML) INJECTION EVERY 8 HOURS
Status: CANCELLED | OUTPATIENT
Start: 2017-10-20

## 2017-10-20 NOTE — TELEPHONE ENCOUNTER
Verified patient with two types of identifiers. Moved patient's procedure up to next Friday 10/27/17 at 2:30 pm. Notified patient to arrive at the hospital at 12:00pm. Patient verbalized understanding and will call with any other questions.

## 2017-10-20 NOTE — TELEPHONE ENCOUNTER
Patient is having tilt table test in November and stated she is having increasing dizziness and feels \"very out if it\" Please call her at [phone].  Thank you

## 2017-10-20 NOTE — TELEPHONE ENCOUNTER
Verified patient with two types of identifiers. Patient states that her symptoms are getting worse. Requesting a sooner date for tilt table. Will notify MD/NP and look for a sooner date for procedure.

## 2017-10-25 ENCOUNTER — TELEPHONE (OUTPATIENT)
Dept: CARDIOLOGY CLINIC | Age: 37
End: 2017-10-25

## 2017-10-25 NOTE — TELEPHONE ENCOUNTER
Verified patient with two types of identifiers. Patient calling to clarify time of her procedure on Friday. Notified patient procedure is scheduled for 2:30 pm but patient needs to arrive at hospital at 12:30 pm. Patient verbalized understanding and will call with any other questions.

## 2017-10-27 ENCOUNTER — HOSPITAL ENCOUNTER (OUTPATIENT)
Dept: CARDIAC CATH/INVASIVE PROCEDURES | Age: 37
Discharge: HOME | End: 2017-10-27
Attending: INTERNAL MEDICINE | Admitting: INTERNAL MEDICINE
Payer: MEDICAID

## 2017-10-27 VITALS
DIASTOLIC BLOOD PRESSURE: 41 MMHG | WEIGHT: 128 LBS | OXYGEN SATURATION: 100 % | TEMPERATURE: 98.5 F | HEIGHT: 63 IN | RESPIRATION RATE: 19 BRPM | BODY MASS INDEX: 22.68 KG/M2 | SYSTOLIC BLOOD PRESSURE: 129 MMHG | HEART RATE: 93 BPM

## 2017-10-27 PROBLEM — R42 DIZZINESS: Status: ACTIVE | Noted: 2017-10-27

## 2017-10-27 PROBLEM — G90.A POTS (POSTURAL ORTHOSTATIC TACHYCARDIA SYNDROME): Status: ACTIVE | Noted: 2017-10-27

## 2017-10-27 PROBLEM — R00.2 HEART PALPITATIONS: Status: ACTIVE | Noted: 2017-10-27

## 2017-10-27 PROBLEM — Z78.9 BETA-BLOCKER INTOLERANCE: Status: RESOLVED | Noted: 2017-09-17 | Resolved: 2017-10-27

## 2017-10-27 LAB
BASOPHILS # BLD AUTO: 0 X10E3/UL (ref 0–0.2)
BASOPHILS NFR BLD AUTO: 0 %
BUN SERPL-MCNC: 11 MG/DL (ref 6–20)
BUN/CREAT SERPL: 17 (ref 9–23)
CALCIUM SERPL-MCNC: 9.9 MG/DL (ref 8.7–10.2)
CHLORIDE SERPL-SCNC: 102 MMOL/L (ref 96–106)
CO2 SERPL-SCNC: 27 MMOL/L (ref 18–29)
CREAT SERPL-MCNC: 0.64 MG/DL (ref 0.57–1)
EOSINOPHIL # BLD AUTO: 0.2 X10E3/UL (ref 0–0.4)
EOSINOPHIL NFR BLD AUTO: 3 %
ERYTHROCYTE [DISTWIDTH] IN BLOOD BY AUTOMATED COUNT: 14 % (ref 12.3–15.4)
GFR SERPLBLD CREATININE-BSD FMLA CKD-EPI: 114 ML/MIN/1.73
GFR SERPLBLD CREATININE-BSD FMLA CKD-EPI: 131 ML/MIN/1.73
GLUCOSE SERPL-MCNC: 79 MG/DL (ref 65–99)
HCG UR QL: NEGATIVE
HCT VFR BLD AUTO: 42 % (ref 34–46.6)
HGB BLD-MCNC: 14.1 G/DL (ref 11.1–15.9)
IMM GRANULOCYTES # BLD: 0 X10E3/UL (ref 0–0.1)
IMM GRANULOCYTES NFR BLD: 0 %
LYMPHOCYTES # BLD AUTO: 2.3 X10E3/UL (ref 0.7–3.1)
LYMPHOCYTES NFR BLD AUTO: 35 %
MCH RBC QN AUTO: 29 PG (ref 26.6–33)
MCHC RBC AUTO-ENTMCNC: 33.6 G/DL (ref 31.5–35.7)
MCV RBC AUTO: 86 FL (ref 79–97)
MONOCYTES # BLD AUTO: 0.4 X10E3/UL (ref 0.1–0.9)
MONOCYTES NFR BLD AUTO: 6 %
NEUTROPHILS # BLD AUTO: 3.5 X10E3/UL (ref 1.4–7)
NEUTROPHILS NFR BLD AUTO: 56 %
PLATELET # BLD AUTO: 472 X10E3/UL (ref 150–379)
POTASSIUM SERPL-SCNC: 4.2 MMOL/L (ref 3.5–5.2)
RBC # BLD AUTO: 4.86 X10E6/UL (ref 3.77–5.28)
SODIUM SERPL-SCNC: 143 MMOL/L (ref 134–144)
WBC # BLD AUTO: 6.4 X10E3/UL (ref 3.4–10.8)

## 2017-10-27 PROCEDURE — 93660 TILT TABLE EVALUATION: CPT

## 2017-10-27 PROCEDURE — 81025 URINE PREGNANCY TEST: CPT | Performed by: NURSE PRACTITIONER

## 2017-10-27 RX ORDER — ATROPINE SULFATE 0.1 MG/ML
1 INJECTION INTRAVENOUS AS NEEDED
Status: DISCONTINUED | OUTPATIENT
Start: 2017-10-27 | End: 2017-10-27

## 2017-10-27 RX ORDER — PROPRANOLOL HYDROCHLORIDE 60 MG/1
60 CAPSULE, EXTENDED RELEASE ORAL
Qty: 30 CAP | Refills: 1 | Status: SHIPPED | OUTPATIENT
Start: 2017-10-27 | End: 2018-01-25 | Stop reason: SDUPTHER

## 2017-10-27 RX ORDER — SODIUM CHLORIDE 0.9 % (FLUSH) 0.9 %
5-10 SYRINGE (ML) INJECTION EVERY 8 HOURS
Status: DISCONTINUED | OUTPATIENT
Start: 2017-10-27 | End: 2017-10-27

## 2017-10-27 RX ORDER — NITROGLYCERIN 0.4 MG/1
0.4 TABLET SUBLINGUAL AS NEEDED
Status: DISCONTINUED | OUTPATIENT
Start: 2017-10-27 | End: 2017-10-27 | Stop reason: HOSPADM

## 2017-10-27 RX ORDER — SODIUM CHLORIDE 0.9 % (FLUSH) 0.9 %
5-10 SYRINGE (ML) INJECTION AS NEEDED
Status: DISCONTINUED | OUTPATIENT
Start: 2017-10-27 | End: 2017-10-27

## 2017-10-27 NOTE — INTERVAL H&P NOTE
H&P Update:  Stephani Bermudez was seen and examined. History and physical has been reviewed. The patient has been examined.  There have been no significant clinical changes since the completion of the originally dated History and Physical.    Signed By: Mirna Kovacs MD     October 27, 2017 3:08 PM

## 2017-10-27 NOTE — PROCEDURES
DATE of PROCEDURE: 10/27/2017  Head-up tilt-table test      HISTORY:  This is a 44 y.o. woman with a history of dizziness and palpitation  She usually feels these when being upright. The patient was explained the risks and benefits of the head-up tilt-table test and she agreed to proceed. PROCEDURE IN DETAIL:  After informed written consent was obtained, the patient was brought to the Electrophysiology Suite where she was laid on the tilt table. The patient was strapped down for her safety and she had the baseline blood pressure of 121/65, heart rate of 88 beats per minute. The patient was then tilted up to 70 degrees. During the baseline tilt test her heart rate abruptly increased to 138 beats per minute and blood pressure was higher to 137/71 and she felt immediately dizzy, wanting to faint. At the end, her blood pressure was 165/70 and heart rate was 96 beats per minute. Specimen removed: NONE    ASSESSMENT AND PLAN:  The patient had postural orthostatic tachycardia syndrome.   She did not tolerate metoprolol nor cardizem before  Will try different beta blocker such as inderal

## 2017-10-27 NOTE — PROGRESS NOTES
Cardiac Cath Lab Procedure Area Arrival Note:    Eli Edgar arrived to Cardiac Cath Lab, Procedure Area. Patient identifiers verified with NAME and DATE OF BIRTH. Procedure verified with patient. Consent forms verified. Allergies verified. Patient informed of procedure and plan of care. Questions answered with review. Patient voiced understanding of procedure and plan of care. Patient on cardiac monitor, non-invasive blood pressure, SPO2 monitor. On room air. IV of normal saline on pump at 10 ml/hr. Patient status doing well with some problems : dizziness. Patient is A&Ox 4. Patient reports feeling dizzy. Patient medicated during procedure with orders obtained and verified by Dr. Darin Rae. Refer to patients Cardiac Cath Lab PROCEDURE REPORT for vital signs, assessment, status, and response during procedure, printed at end of case. Printed report on chart or scanned into chart.

## 2017-10-27 NOTE — DISCHARGE INSTRUCTIONS
Future Appointments  Date Time Provider [place]   10/31/2017 3:20  El Earl, DO [address]   11/9/2017 2:40 PM Mirna Kovacs  [address]   12/26/2017 11:40 AM [facility] Recinos [facility]   2/5/2018 11:20 AM Hamzah Oliveira  [address]   3/23/2018 11:50 AM Padma Machado MD [address] [facility]     Take inderal LA at bedtime once a day       Aprenda sobre el síndrome de taquicardia ortostática postural - [ Learning About Postural Orthostatic Tachycardia Syndrome (POTS) ]  ¿Qué es el POTS? El síndrome de taquicardia ortostática postural (POTS, por jayleen siglas en inglés) es buck frecuencia cardíaca rápida (taquicardia) que comienza después de que usted se pone de pie. Doffing puede pasar de repente hasta 10 minutos después de pararse. ¿Qué pasa cuando tiene POTS? Con el POTS, el cuerpo no controla la presión arterial ni la frecuencia cardíaca kyle debiera después de que usted se pone de pie. De modo que, por un tiempo breve, es posible que no reciba suficiente charles en el cerebro. Las personas con fatiga y [employer] significativos pueden tener dificultades para hacer jayleen actividades diarias. Rebeca hay tratamiento que puede ayudar. ¿Cuáles son los síntomas? El POTS puede hacer que se sienta mareado y aturdido. [place] vez se desmaye. Es posible que [place] se sienta cansado. Maribel borroso y sentirse ansioso también son síntomas. Y quizá tenga problemas para mantenerse concentrado. Los síntomas pueden variar de moderados a jacek. Algunas cosas pueden empeorar jayleen síntomas. Entre ellas se [employer], comer, el ejercicio, ducharse, estar sentado por mucho tiempo y cambios en el ciclo menstrual.  Al notar los síntomas, sentarse o recostarse podría ayudarle a sentirse mejor. ¿Cuál es la causa? El POTS puede seguir a buck enfermedad viral, buck operación, el [place], reposo en cama o un trauma importante.  Los entendidos no entienden qué lo causa, rebeca los diferentes sistemas del cuerpo parecen estar desequilibrados. ¿Cómo se diagnostica el POTS? Para saber cuál es la causa de jayleen síntomas, glass médico diamond vez:  · Le pregunte sobre jayleen síntomas, incluyendo cuándo y cómo empezaron. · Le revise cómo le cambian la presión arterial y la frecuencia cardíaca cuando usted pasa de estar recostado a estar sentado y luego se para. · Le shyanne buck prueba de ruvalcaba inclinada. La prueba [place] buck ruvalcaba especial que lo inclina lentamente a buck posición erguida. Revisa cómo responde glass cuerpo cuando usted cambia de posición. ¿Cómo se trata el POTS? Colabore con glass médico para encontrar la combinación correcta de tratamientos. Estos tratamientos pueden incluir:  · Tess los medicamentos recetados por glass médico. Para algunas personas, tess medicamentos que normalmente se usan para la presión arterial juan puede ayudar. Tess medicamentos que [employer] líquidos corporales en equilibrio también puede ser Adrian. · Cuidado personal diario. Estas prácticas pueden desempeñar un papel clave para ayudar al cuerpo a recuperar el equilibrio. [facility] Para muchas personas, tener bajo el líquido corporal es parte de lo que [employer] síntomas del POTS. ¨ Ingiera la cantidad de sal que le indique el médico. La michael ayuda a mantener alto el nivel de líquido del cuerpo. ¨ Pruebe un programa especial de ejercicios. Es posible que glass médico le dé un programa de ejercicios específico. Usted comienza de a poco y lentamente, especialmente si la fatiga es un problema. Aumente el ejercicio de a poco. Al principio, solo hace ejercicio cuando esté reclinado. Después de [employer], usted comienza a añadir ejercicios en posición erguida. ¨ Lleve un registro de jayleen síntomas y de lo que los Jilliana y lo que los ASAD. ¿Cuándo debe pedir ayuda? Llame al 911 en cualquier momento que considere que puede necesitar atención de [place].  Por ejemplo, llame si:  · Se desmayó (perdió el conocimiento), y se siente diferente de glass episodio típico o no se recupera con la misma rapidez que en [employer]. Llame a glass médico ahora mismo o busque atención médica inmediata si:  · Loli síntomas están empeorando. Por ejemplo, usted [employer] o aturdido. Preste especial atención a los cambios en glass jaden y asegúrese de comunicarse con glass médico si:  · No mejora kyle se esperaba. La atención de seguimiento es buck parte clave de glass tratamiento y seguridad. Asegúrese de hacer y acudir a todas las citas, y llame a glass médico si está teniendo problemas. También es buck buena idea saber los resultados de los exámenes y mantener buck lista de los medicamentos que ezequiel. ¿Dónde puede encontrar más información en inglés? Radha Presto a http://jareth-femi.info/. Chuck Hendrix N587 en la búsqueda para aprender más acerca de \"Aprenda sobre el síndrome de taquicardia ortostática postural - [ Learning About Postural Orthostatic Tachycardia Syndrome (POTS) ]. \"  Revisado: 21 septiembre, 2016  Versión del contenido: 11.4  © 7874-2709 Healthwise, Incorporated. Las instrucciones de cuidado fueron adaptadas bajo licencia por [employer] (which disclaims liability or warranty for this information). Si usted tiene [place] afección médica o sobre estas instrucciones, siempre pregunte a glass profesional de jaden. Healthwise, Incorporated niega toda garantía o responsabilidad por glass uso de esta información.

## 2017-10-27 NOTE — PROGRESS NOTES
1550: I have reviewed discharge instructions with the patient. The patient verbalized understanding. Nadia Form ambulated @ 9805 (time) approximately 100 feet. Patient tolerated ambulation without adverse advents.

## 2017-10-27 NOTE — PROGRESS NOTES
Cardiac Cath Lab Recovery Arrival Note:      Es Montalvo arrived to Cardiac Cath Lab, Recovery Area. Staff introduced to patient. Patient identifiers verified with NAME and DATE OF BIRTH. Procedure verified with patient. Consent forms reviewed and signed by patient or authorized representative and verified. Allergies verified. Patient informed of procedure and plan of care. Questions answered with review. Patient prepped for procedure, per orders from physician, prior to arrival.    Patient on cardiac monitor, non-invasive blood pressure, SPO2 monitor. Patient is A&Ox 4. Patient reports dizziness and nauseated. Patient in stretcher, in low position, with side rails up, call bell within reach, patient instructed to call of assistance as needed. Patient prep in: [facility] Recovery Area, Bed# 7. Family in: waiting room. Prep by: ALDEN Nick

## 2017-10-27 NOTE — IP AVS SNAPSHOT
[facility] [facility] 74 
[phone] Patient: Love Allen MRN: [id number] Virl Hurst My Medications TAKE these medications as instructed Instructions Each Dose to Equal  
 Morning Noon Evening Bedtime  
 biotin 1,000 mcg Chew Your last dose was: Your next dose is: Take  by mouth.  
     
   
   
   
  
 propranolol LA 60 mg SR capsule Commonly known as:  INDERAL LA Your last dose was: Your next dose is: Take 1 Cap by mouth nightly. Indications: POTS  
 60 mg Where to Get Your Medications These medications were sent to [address] [facility] [place] [address] AT [address], [address] [facility] Hours:  24-hours Phone:  [phone]  
  propranolol LA 60 mg SR capsule

## 2017-10-27 NOTE — H&P (VIEW-ONLY)
Cardiac Electrophysiology Office Consultation Note     Subjective:      Hallie Bautista is a 44 y.o. patient who is seen for evaluation of Palpitations, dizziness when she stands up  She gets shortness of breaths with this  She could not take beta blocker due to fatigue  cardizem bottomed out BP  She has stopped it  Echo was normal.  ECG normal  holter sinus tach at times to 143 bpm, PVCs and PAC       Patient Active Problem List   Diagnosis Code    Subclinical hyperthyroidism E05.90    Multinodular goiter E04.2    Beta-blocker intolerance Z78.9     Current Outpatient Prescriptions   Medication Sig Dispense Refill    biotin 1,000 mcg chew Take  by mouth.  cholecalciferol (VITAMIN D3) 1,000 unit cap Take  by mouth daily. No Known Allergies  Past Medical History:   Diagnosis Date    Hypertension     Multiple thyroid nodules     Palpitations     Thyroid nodule      No past surgical history   Family History   Problem Relation Age of Onset    Kidney Disease Mother      MARCUS    Hypertension Father     Diabetes Father     Kidney Disease Maternal Grandmother     Hypertension Maternal Grandmother     Cancer Maternal Grandfather      sto,acj    Hypertension Maternal Grandfather     No Known Problems Paternal Grandmother     No Known Problems Paternal Grandfather     No Known Problems Sister     No Known Problems Brother     Thyroid Disease Cousin      x4    Thyroid Cancer Neg Hx      Social History   Substance Use Topics    Smoking status: Never Smoker    Smokeless tobacco: Never Used    Alcohol use No        Review of Systems:   Constitutional: Negative for fever, chills, weight loss, malaise/fatigue. HEENT: Negative for nosebleeds, vision changes. Respiratory: Negative for cough, hemoptysis  Cardiovascular: Negative for chest pain, + palpitations, no orthopnea, claudication, leg swelling, syncope, and PND.    Gastrointestinal: Negative for nausea, vomiting, diarrhea, blood in stool and melena. Genitourinary: Negative for dysuria, and hematuria. Musculoskeletal: Negative for myalgias, arthralgia. Skin: Negative for rash. Heme: Does not bleed or bruise easily. Neurological: Negative for speech change and focal weakness     Objective:     Visit Vitals    /78 (BP 1 Location: Right arm, BP Patient Position: Sitting)    Pulse 74    Wt 128 lb 6.4 oz (58.2 kg)    SpO2 99%    BMI 22.75 kg/m2      Physical Exam:   Constitutional: well-developed and well-nourished. No respiratory distress. Head: Normocephalic and atraumatic. Eyes: Pupils are equal, round  ENT: hearing normal  Neck: supple. No JVD present. Cardiovascular: Normal rate, regular rhythm. Exam reveals no gallop and no friction rub. No murmur heard. Pulmonary/Chest: Effort normal and breath sounds normal. No wheezes. Abdominal: Soft, no tenderness. Musculoskeletal: no edema. Neurological: alert,oriented. Skin: Skin is warm and dry  Psychiatric: normal mood and affect. Behavior is normal. Judgment and thought content normal.         Assessment/Plan:       ICD-10-CM ICD-9-CM    1. Dizzy R42 780.4    2. Beta-blocker intolerance Z78.9 995.27    3. Rapid resting heart rate R00.0 785.0      reviewed diet, exercise and weight control  reviewed medications and side effects in detail   We discussed tilt table test and based on this BP and HR change and rhythm I will then discuss with her management  I have used 's service and she agrees to proceed    Thank you for involving me in this patient's care and please call with further concerns or questions. Han Martinez M.D.   Electrophysiology/Cardiology  [facility]  [address], [place] [address], [address]  [facility], [address]                             [address] [phone]

## 2017-10-27 NOTE — IP AVS SNAPSHOT
2700 [facility] [address] [place] [address] 
[phone] Patient: Matthew Huston MRN: [id number] Smith Campbell About your hospitalization You were admitted on:  October 27, 2017 You last received care in the:  [address], [facility]/s Dr RONALDO BETANCOURT You were discharged on:  October 27, 2017 Why you were hospitalized Your primary diagnosis was:  Pots (Postural Orthostatic Tachycardia Syndrome) Your diagnoses also included:  Dizziness, Heart Palpitations Things You Need To Do (next 8 weeks) Tuesday Oct 31, 2017 Follow Up with Sharyn Mcfarland DO at  3:20 PM  
Where: [facility] at [address] [place] [address]) Thursday Nov 09, 2017 ESTABLISHED PATIENT with Brice Bennett MD at  2:40 PM  
Where:  [address] ([facility]) Discharge Orders None A check zaire indicates which time of day the medication should be taken. My Medications TAKE these medications as instructed Instructions Each Dose to Equal  
 Morning Noon Evening Bedtime  
 biotin 1,000 mcg Chew Your last dose was: Your next dose is: Take  by mouth.  
     
   
   
   
  
 propranolol LA 60 mg SR capsule Commonly known as:  INDERAL LA Your last dose was: Your next dose is: Take 1 Cap by mouth nightly. Indications: POTS  
 60 mg Where to Get Your Medications These medications were sent to 18[address] [facility] [address] [place] [address] AT [address] [facility]  [address] [place] [address] [address], [address] [place] [address] Hours:  24-hours Phone:  [phone]  
  propranolol LA 60 mg SR capsule Discharge Instructions Future Appointments Date Time Provider Danielle Anderson 10/31/2017 3:20 PM DO KARTHIK Arechiga/ Ursula Lucas  
 11/9/2017 2:40 PM Prince Eckert  [address]  
12/26/2017 11:40 AM Erika Alcantara MD [facility]  
2/5/2018 11:20 AM Rafa Lebron  [address]  
3/23/2018 11:50 AM Rashi More MD RDE KENTRELL [facility] Take inderal LA at bedtime once a day Aprenda sobre el síndrome de taquicardia ortostática postural - [ Learning About Postural Orthostatic Tachycardia Syndrome (POTS) ] Qué es el POTS? El síndrome de taquicardia ortostática postural (POTS, por jayleen siglas en inglés) es buck frecuencia cardíaca rápida (taquicardia) que comienza después de que usted se pone de pie. [place] puede pasar de repente hasta 10 minutos después de pararse. Qué pasa cuando tiene POTS? Con el POTS, el cuerpo no controla la presión arterial ni la frecuencia cardíaca kyle debiera después de que usted se pone de pie. De modo que, por un tiempo breve, es posible que no reciba suficiente charles en el cerebro. Las personas con fatiga y [employer] significativos pueden tener dificultades para hacer jayleen actividades diarias. Rebeca hay tratamiento que puede ayudar. Cuáles son los síntomas? El POTS puede hacer que se sienta mareado y aturdido. [place] vez se desmaye. Es posible que [place] se sienta cansado. Maribel borroso y sentirse ansioso también son síntomas. Y quizá tenga problemas para mantenerse concentrado. Los síntomas pueden variar de moderados a jacek. Algunas cosas pueden empeorar jayleen síntomas. Entre ellas se [employer], comer, el ejercicio, ducharse, estar sentado por mucho tiempo y cambios en el ciclo menstrual. 
Al notar los síntomas, sentarse o recostarse podría ayudarle a sentirse mejor. Cuál es la causa? El POTS puede seguir a buck enfermedad viral, buck operación, el [place], reposo en cama o un trauma importante. Los entendidos no entienden qué lo causa, rebeca los diferentes sistemas del cuerpo parecen estar desequilibrados. Cómo se diagnostica el POTS? Para saber cuál es la causa de jayleen síntomas, glass médico diamond vez: · Le pregunte sobre jayleen síntomas, incluyendo cuándo y cómo Alex. · Le revise cómo le cambian la presión arterial y la frecuencia cardíaca cuando usted pasa de estar recostado a estar sentado y luego se para. · Le shyanne buck prueba de ruvalcaba inclinada. La prueba [place] buck ruvalcaba especial que lo inclina lentamente a buck posición erguida. Revisa cómo responde glass cuerpo cuando usted cambia de posición. Cómo se trata el POTS? Colabore con glass médico para encontrar la combinación correcta de tratamientos. Estos tratamientos pueden incluir: · Tess los medicamentos recetados por glass médico. Para algunas personas, tess medicamentos que normalmente se usan para la presión arterial juan puede ayudar. Tess medicamentos que [employer] líquidos corporales en equilibrio también puede ser [place]. · Cuidado personal diario. Estas prácticas pueden desempeñar un papel clave para ayudar al cuerpo a recuperar el equilibrio. [facility] Para muchas personas, tener bajo el líquido corporal es parte de lo que [employer] síntomas del POTS. ¨ Ingiera la cantidad de sal que le indique el médico. La michael ayuda a mantener alto el nivel de líquido del cuerpo. ¨ Pruebe un programa especial de ejercicios. Es posible que glass médico le dé un programa de ejercicios específico. Usted comienza de a poco y lentamente, especialmente si la fatiga es un problema. Aumente el ejercicio de a poco. Al principio, solo hace ejercicio cuando esté reclinado. Después de [employer], usted comienza a añadir ejercicios en posición erguida. ¨ Lleve un registro de jayleen síntomas y de lo que los [place] y lo que los [employer]. Cuándo debe pedir ayuda? Llame al 911 en cualquier momento que considere que puede necesitar atención de [place].  Por ejemplo, llame si: 
· Se desmayó (perdió el conocimiento), y se siente diferente de glass episodio típico o no se recupera con la misma rapidez que en [employer]. Llame a glass médico ahora mismo o busque atención médica inmediata si: 
· Loli síntomas están empeorando. Por ejemplo, usted [employer] o aturdido. Preste especial atención a los cambios en glass jaden y asegúrese de comunicarse con glass médico si: 
· No mejora kyle se esperaba. La atención de seguimiento es buck parte clave de glass tratamiento y seguridad. Asegúrese de hacer y acudir a todas las citas, y llame a glass médico si está teniendo problemas. También es buck buena idea saber los resultados de los exámenes y mantener buck lista de los medicamentos que ezequiel. Dónde puede encontrar más información en inglés? Johann Don a http://jareth-femi.info/. Litzy June A962 en la búsqueda para aprender más acerca de \"Aprenda sobre el síndrome de taquicardia ortostática postural - [ Learning About Postural Orthostatic Tachycardia Syndrome (POTS) ]. \" 
Revisado: 21 septiembre, 2016 Versión del contenido: 11.4 © 0540-0592 Healthwise, Incorporated. Las instrucciones de cuidado fueron adaptadas bajo licencia por Good Help Connections (which disclaims liability or warranty for this information). Si usted tiene [place] afección médica o sobre estas instrucciones, siempre pregunte a glass profesional de jaden. Healthwise, Incorporated niega toda garantía o responsabilidad por glass uso de esta información. Introducing [facility]! [facility] introduce portal paciente MyChart . Ahora se puede acceder a partes de glass expediente médico, enviar por correo electrónico la oficina de glass médico y solicitar renovaciones de medicamentos en línea. En glass navegador de Internet , Murielne Xavier a [email or web address]. com/[email or web address] Medicalt Alisson fiordaliza en el usuario por Napolean Copier? Mindy mancera aquí en la sesión Sharpe Clint. Verá la página de registro [place]. Ingrese glass código de [facility] diamond y kyle aparece a continuación. Usted no tendrá que [employer] código después de jackie completado el proceso de registro . Si usted no se inscribe antes de la fecha de caducidad , debe solicitar un nuevo código. · MyChart Código de acceso : [id number] Expires: 10/30/2017  2:30 PM 
 
Ingresa los últimos cuatro dígitos de glass Número de Seguro Social ( xxxx ) y fecha de nacimiento ( dd / mm / aaaa ) kyle se indica y alisson clic en Enviar. Usted será llevado a la siguiente página de registro . Crear un ID MyChart . Esta será glass ID de inicio de sesión de MyChart y no puede ser [place] , por lo que pensar en buck que es Tacy Click y fácil de recordar . Crear buck contraseña MyChart . Usted puede cambiar glass contraseña en cualquier momento . Ingrese glass Password Reset de preguntas y [place] . [place] se puede utilizar en un momento posterior si usted olvida glass contraseña. Introduzca glass dirección de correo electrónico . Jonathon Smith recibirá buck notificación por correo electrónico cuando la nueva información está disponible en MyChart . Noelle de guzmanic en Registrarse. Carlo Chacha marija y descargar porciones de glass expediente médico. 
Alisson clic en el enlace de descarga del menú Resumen para descargar buck copia portátil de glass información médica . Si tiene [employer] , por favor visite la sección de preguntas frecuentes del sitio web MyChart . Recuerde, MyChart NO es que se utilizará para las necesidades urgentes. Para emergencias médicas , llame al 911 . Ahora disponible en glass iPhone y Android ! Unresulted Labs-Please follow up with your PCP about these lab tests Order Current Status HCG URINE, QL In process Providers Seen During Your Hospitalization Provider Specialty Primary office phone Darby Yates MD Cardiology [phone] Your Primary Care Physician (PCP) Primary Care Physician Office Phone Office Fax Richie Trevino [phone] You are allergic to the following No active allergies Recent Documentation Height Weight Breastfeeding? BMI OB Status Smoking Status 1.6 m 58.1 kg No 22.67 kg/m2 Having regular periods Never Smoker Emergency Contacts Name Discharge Info Relation Home Work Mobile Owen Lockett  Spouse [3]   [phone] Owen Lockett DISCHARGE CAREGIVER [3] Friend [5] [phone] Patient Belongings The following personal items are in your possession at time of discharge: 
     Visual Aid: None Please provide this summary of care documentation to your next provider. Signatures-by signing, you are acknowledging that this After Visit Summary has been reviewed with you and you have received a copy. Patient Signature:  ____________________________________________________________ Date:  ____________________________________________________________  
  
[employer] Provider Signature:  ____________________________________________________________ Date:  ____________________________________________________________  
  
  
   
  
[address] 
[phone] Patient: Luther Rogers MRN: [id number] Lisandro Galdamez Sobre aggarwal hospitalización Le admitieron el:  October 27, 2017 Aggarwal tratamiento más reciente [place] Stathmos el:  [facility] CARDIAC CATH LAB Le dieron de juan el:  October 27, 2017 Por qué le ingresaron Aggarwal diagnosis primaria es:  Pots (Postural Orthostatic Tachycardia Syndrome) Aggarwal diagnosis también incluye:  Dizziness, Heart Palpitations Things You Need To Do (next 8 weeks) Tuesday Oct 31, 2017 Follow Up with Kristian Gallardo DO at  3:20 PM  
Where: [facility] at [address]) Thursday Nov 09, 2017 ESTABLISHED PATIENT with Valerie Valenzuela MD at  2:40 PM  
Where:  [address] ([address] [place] [address]) Discharge Orders [place] A check zaire indicates which time of day the medication should be taken. My Medications TAKE these medications as instructed Instructions Each Dose to Equal  
 Morning Noon Evening Bedtime  
 biotin 1,000 mcg Chew Your last dose was: Your next dose is: Take  by mouth.  
     
   
   
   
  
 propranolol LA 60 mg SR capsule También conocido kyle:  INDERAL LA Your last dose was: Your next dose is: Take 1 Cap by mouth nightly. Indications: POTS  
 60 mg  
    
   
   
   
  
  
  
Dónde puede recoger jayleen medicamentos Estos medicamentos fueron enviados a [facility] [address] - Rakan Munson, [address] [facility] [place] AT [address] [place] [address] [address], [address] [facility] [place]:  24-horas Teléfono:  [phone]  
  propranolol LA 60 mg SR capsule Instrucciones a mariel de juan Future Appointments Date Time Provider [place] 10/31/2017 3:20 PM [address] [place] [address], [facility]  
11/9/2017 2:40 PM Zohra Red  [address]   
12/26/2017 11:40 AM Diane Wheatley MD [place] [facility]  
2/5/2018 11:20 AM Marco Oliver  [address]   
3/23/2018 11:50 AM Yeyo Babb MD [address] Take inderal LA at bedtime once a day Aprenda sobre el síndrome de taquicardia ortostática postural - [ Learning About Postural Orthostatic Tachycardia Syndrome (POTS) ] Qué es el POTS? El síndrome de taquicardia ortostática postural (POTS, por jayleen siglas en inglés) es buck frecuencia cardíaca rápida (taquicardia) que comienza después de que usted se pone de pie. [place] puede pasar de repente hasta 10 minutos después de pararse. Qué pasa cuando tiene POTS? Con el POTS, el cuerpo no controla la presión arterial ni la frecuencia cardíaca kyle debiera después de que usted se pone de pie.  [employer], por un tiempo breve, es posible que no reciba suficiente charles en el cerebro. Las personas con fatiga y [employer] significativos pueden tener dificultades para hacer jayleen actividades diarias. Rebeca hay tratamiento que puede ayudar. Cuáles son los síntomas? El POTS puede hacer que se sienta mareado y aturdido. Diamond vez se desmaye. Es posible que [place] se sienta cansado. Maribel borroso y sentirse ansioso también son síntomas. Y quizá tenga problemas para mantenerse concentrado. Los síntomas pueden variar de moderados a jacek. Algunas cosas pueden empeorar jayleen síntomas. Entre ellas se [employer], comer, el ejercicio, ducharse, estar sentado por mucho tiempo y cambios en el ciclo menstrual. 
Al notar los síntomas, sentarse o recostarse podría ayudarle a sentirse mejor. Cuál es la causa? El POTS puede seguir a buck enfermedad viral, buck operación, el [place], reposo en cama o un trauma importante. Los entendidos no entienden qué lo causa, rebeca los diferentes sistemas del cuerpo parecen estar desequilibrados. Cómo se diagnostica el POTS? Para saber cuál es la causa de jayleen síntomas, glass médico diamond vez: · Le pregunte sobre jayleen síntomas, incluyendo cuándo y cómo Alex. · Le revise cómo le cambian la presión arterial y la frecuencia cardíaca cuando usted pasa de estar recostado a estar sentado y luego se para. · Le shyanne buck prueba de ruvalcaba inclinada. La prueba [place] buck ruvalcaba especial que lo inclina lentamente a buck posición erguida. Revisa cómo responde glass cuerpo cuando usted cambia de posición. Cómo se trata el POTS? Col[place]ore con glass médico para encontrar la combinación correcta de tratamientos. Estos tratamientos pueden incluir: · Tess los medicamentos recetados por glass médico. Para algunas personas, tess medicamentos que normalmente se usan para la presión arterial juan puede ayudar. Tess medicamentos que [employer] líquidos corporales en equilibrio también puede ser [place]. · Cuidado personal diario. Estas prácticas pueden desempeñar un papel clave para ayudar al cuerpo a recuperar el equilibrio. 400 [facility] Para muchas personas, tener bajo el líquido corporal es parte de lo que Lee [employer] síntomas del POTS. ¨ Ingiera la cantidad de sal que le indique el médico. La michael ayuda a mantener alto el nivel de líquido del cuerpo. ¨ Pruebe un programa especial de ejercicios. Es posible que glass médico le dé un programa de ejercicios específico. Usted comienza de a poco y lentamente, especialmente si la fatiga es un problema. Aumente el ejercicio de a poco. Al principio, solo hace ejercicio cuando esté reclinado. Después de [employer], usted comienza a añadir ejercicios en posición erguida. ¨ Lleve un registro de loil síntomas y de lo que los [place] y lo que los [facility]. Cuándo debe pedir ayuda? Llame al 911 en cualquier momento que considere que puede necesitar atención de [place]. Por ejemplo, llame si: 
· Se desmayó (perdió el conocimiento), y se siente diferente de glass episodio típico o no se recupera con la misma rapidez que en [employer]. Llame a glass médico ahora mismo o busque atención médica inmediata si: 
· Loli síntomas están empeorando. Por ejemplo, usted [employer] o aturdido. Preste especial atención a los cambios en glass jaden y asegúrese de comunicarse con glass médico si: 
· No mejora kyle se esperaba. La atención de seguimiento es buck parte clave de glass tratamiento y seguridad. Asegúrese de hacer y acudir a todas las citas, y llame a glass médico si está teniendo problemas. También es buck buena idea saber los resultados de los exámenes y mantener buck lista de los medicamentos que ezequiel. Dónde puede encontrar más información en inglés? Suzie Johnson a http://jareth-femi.info/.  
[place] K988 en la búsqueda para aprender más acerca de \"Aprenda sobre el síndrome de taquicardia ortostática postural - [ Learning About Postural Orthostatic Tachycardia Syndrome (POTS) ]. \" 
Revisado: 21 septiembre, 2016 Versión del contenido: 11.4 © 0378-8142 Healthwise, Incorporated. Las instrucciones de cuidado fueron adaptadas bajo licencia por Good Help Connections (which disclaims liability or warranty for this information). Si usted tiene [place] afección médica o sobre estas instrucciones, siempre pregunte a glass profesional de jaden. Healthwise, Incorporated niega toda garantía o responsabilidad por glass uso de esta información. Introducing [facility] [place] [facility]! [facility] introduce portal paciente MyChart . Ahora se puede acceder a partes de glass expediente médico, enviar por correo electrónico la oficina de glass médico y solicitar renovaciones de medicamentos en línea. En glass navegador de Internet , Jessa [place] a [email or web address] Shyanne clic en el usuario por Mila Enoch? Verónica Pagoda clic aquí en la sesión Evern Blazing. Verá la página de registro [place]. Ingrese glass código de [employer] diamond y kyle aparece a continuación. Usted no tendrá que [employer] código después de jackie completado el proceso de registro . Si usted no se inscribe antes de la fecha de caducidad , debe solicitar un nuevo código. · MyChart Código de acceso : [id number] Expires: 10/30/2017  2:30 PM 
 
Ingresa los últimos cuatro dígitos de glass Número de Seguro Social ( xxxx ) y fecha de nacimiento ( dd / mm / aaaa ) kyle se indica y shyanne clic en Enviar. Terri será llevado a la siguiente página de registro . Crear un ID MyChart . Esta será glass ID de inicio de sesión de MyChart y no puede ser [place] , por lo que pensar en buck que es Diane [place] y fácil de recordar . Crear buck contraseña MyChart . Usted puede cambiar glass contraseña en cualquier momento . Ingrese glass Password Reset de preguntas y [place] . [place] se puede utilizar en un momento posterior si usted olvida glass contraseña. Introduzca aggarwal dirección de correo electrónico . Jesu Richards recibirá buck notificación por correo electrónico cuando la nueva información está disponible en MyChart . Alexandra Yvette clic en Registrarse. [place] marija y descargar porciones de aggarwal expediente médico. 
Alisson clic en el enlace de descarga del menú Resumen para descargar buck copia portátil de aggarwal información médica . Si tiene [employer] , por favor visite la sección de preguntas frecuentes del sitio web MyChart . Recuerde, MyChart NO es que se utilizará para las necesidades urgentes. Para emergencias médicas , llame al 911 . Ahora disponible en aggarwal iPhone y Android ! Unresulted Labs-Please follow up with your PCP about these lab tests Order Current Status HCG URINE, QL In process Providers Seen During Your Hospitalization Personal Médico Especialidad Teléfono principal de la oficina Juan Pablo Palomares MD Cardiology [phone] Aggarwal médico de atención primaria (PCP ) Primary Care Physician Office Phone Office Fax Tal Gould [phone] Tiene alergias a lo siguiente No tiene alergias Documentación recientes Height Weight Está amamantando? BMI [facility]) Estado obstétrico Estatus de tabaquísmo 1.6 m 58.1 kg No 22.67 kg/m2 Having regular periods Never Smoker Emergency Contacts Name Discharge Info Relation Home Work Mobile Owen Lockett  Spouse [3]   [phone] Owen Lockett DISCHARGE CAREGIVER [3] Friend [5] [phone] Patient Belongings The following personal items are in your possession at time of discharge: 
     Visual Aid: None Please provide this summary of care documentation to your next provider. Signatures-by signing, you are acknowledging that this After Visit Summary has been reviewed with you and you have received a copy.   
  
 
  
    
    
 Patient Signature: ____________________________________________________________ Date:  ____________________________________________________________  
  
Marvetta Land Provider Signature:  ____________________________________________________________ Date:  ____________________________________________________________

## 2017-10-31 ENCOUNTER — OFFICE VISIT (OUTPATIENT)
Dept: NEUROLOGY | Age: 37
End: 2017-10-31

## 2017-10-31 DIAGNOSIS — G90.A POTS (POSTURAL ORTHOSTATIC TACHYCARDIA SYNDROME): ICD-10-CM

## 2017-10-31 DIAGNOSIS — R42 VERTIGO: Primary | ICD-10-CM

## 2017-10-31 NOTE — LETTER
10/31/2017 Patient:  Jamila Mendenhall YOB: 1980 Date of Visit: 10/31/2017 Dear Cory Santos MD 
[address] [place] [address] VIA Facsimile: [phone] 
 : I was requested by Cory Santos MD to evaluate Ms. Jamila Mendenhall  for Chief Complaint Patient presents with  Dizziness Mercedes Pancho I am recommending the following:  
 
Diagnoses and all orders for this visit: 1. Vertigo 2. POTS (postural orthostatic tachycardia syndrome) 
 
 
 
---------------------------------------------------------------------------------------------------------------------- Below is my encounter: Chief Complaint Patient presents with  Dizziness HPI Panfilo Diaz is a 42-year-old woman here to follow-up on vertigo. Since our last visit she completed an MRI brain which is completely normal.  She completed an MRA as well which was also very normal.  She also saw cardiology who formally diagnosed her with pots. She started propranolol today. She still having vertiginous symptoms that are usually triggered with position changes. No unusual headaches. Sometimes she feels near syncopal.  She is also complaining of nightly cramps and myalgias. Review of Systems Cardiovascular: Positive for palpitations. Gastrointestinal: Positive for nausea. Musculoskeletal: Positive for myalgias. Neurological: Positive for dizziness. All other systems reviewed and are negative. Past Medical History:  
Diagnosis Date  Hypertension  Multiple thyroid nodules  Palpitations  Thyroid nodule Family History Problem Relation Age of Onset  Kidney Disease Mother MARCUS  Hypertension Father  Diabetes Father  Kidney Disease Maternal Grandmother  Hypertension Maternal Grandmother  Cancer Maternal Grandfather   
  sto,acj  Hypertension Maternal Grandfather  No Known Problems Paternal Grandmother  No Known Problems Paternal Grandfather  No Known Problems Sister  No Known Problems Brother  Thyroid Disease Cousin x4  Thyroid Cancer Neg Hx Social History Social History  Marital status: SINGLE Spouse name: N/A  
 Number of children: N/A  
 Years of education: N/A Occupational History  Not on file. Social History Main Topics  Smoking status: Never Smoker  Smokeless tobacco: Never Used  Alcohol use No  
 Drug use: No  
 Sexual activity: Not on file Other Topics Concern  Not on file Social History Narrative ** Merged History Encounter ** No Known Allergies Current Outpatient Prescriptions Medication Sig  propranolol LA (INDERAL LA) 60 mg SR capsule Take 1 Cap by mouth nightly. Indications: POTS  
 biotin 1,000 mcg chew Take  by mouth. No current facility-administered medications for this visit. Neurologic Exam  
 
Mental Status WD/WN adult in NAD, normal grooming VSS 
A&O x 3 PERRL, nonicteric Face is symmetric, tongue midline Speech is fluent and clear No limb ataxia. No abnl movements. Moving all extemities spontaneously and symmetric Normal gait CVS RRR Lungs nonlabored Skin is warm and dry There were no vitals taken for this visit. Assessment and Plan Diagnoses and all orders for this visit: 1. Vertigo 2. POTS (postural orthostatic tachycardia syndrome) 26-year-old woman with vertigo. No clear neurologic etiology. Her imaging is completely benign without any evidence to suggest demyelination or stroke. No ischemic issues. Vascular imaging is normal.  She is still undergoing treatment from cardiology for her pots. We will see if that has any impact on improvement. She is going to see ENT as well. There seems to be a an exquisite positional component suggestive of a peripheral process. As far as her nightly myalgias. Recommend she start taking magnesium at bedtime. Follow-up as needed. Thank you for giving me the opportunity to assist in the care of Ms. Cherelle Dejesus. If you have questions, please do not hesitate to contact me. Sincerely, [address] [facility], DO Neurologist 
Diplomate AGGIE

## 2017-10-31 NOTE — PROGRESS NOTES
Chief Complaint   Patient presents with    Dizziness       HPI    Vipin Mcmahon is a 65-year-old woman here to follow-up on vertigo. Since our last visit she completed an MRI brain which is completely normal.  She completed an MRA as well which was also very normal.  She also saw cardiology who formally diagnosed her with pots. She started propranolol today. She still having vertiginous symptoms that are usually triggered with position changes. No unusual headaches. Sometimes she feels near syncopal.  She is also complaining of nightly cramps and myalgias. Review of Systems   Cardiovascular: Positive for palpitations. Gastrointestinal: Positive for nausea. Musculoskeletal: Positive for myalgias. Neurological: Positive for dizziness. All other systems reviewed and are negative. Past Medical History:   Diagnosis Date    Hypertension     Multiple thyroid nodules     Palpitations     Thyroid nodule      Family History   Problem Relation Age of Onset    Kidney Disease Mother      MARCUS    Hypertension Father     Diabetes Father     Kidney Disease Maternal Grandmother     Hypertension Maternal Grandmother     Cancer Maternal Grandfather      sto,acj    Hypertension Maternal Grandfather     No Known Problems Paternal Grandmother     No Known Problems Paternal Grandfather     No Known Problems Sister     No Known Problems Brother     Thyroid Disease Cousin      x4    Thyroid Cancer Neg Hx      Social History     Social History    Marital status: SINGLE     Spouse name: N/A    Number of children: N/A    Years of education: N/A     Occupational History    Not on file.      Social History Main Topics    Smoking status: Never Smoker    Smokeless tobacco: Never Used    Alcohol use No    Drug use: No    Sexual activity: Not on file     Other Topics Concern    Not on file     Social History Narrative    ** Merged History Encounter **          No Known Allergies      Current Outpatient Prescriptions   Medication Sig    propranolol LA (INDERAL LA) 60 mg SR capsule Take 1 Cap by mouth nightly. Indications: POTS    biotin 1,000 mcg chew Take  by mouth. No current facility-administered medications for this visit. Neurologic Exam     Mental Status        WD/WN adult in NAD, normal grooming  VSS  A&O x 3    PERRL, nonicteric  Face is symmetric, tongue midline  Speech is fluent and clear  No limb ataxia. No abnl movements. Moving all extemities spontaneously and symmetric  Normal gait    CVS RRR  Lungs nonlabored  Skin is warm and dry         There were no vitals taken for this visit. Assessment and Plan   Diagnoses and all orders for this visit:    1. Vertigo    2. POTS (postural orthostatic tachycardia syndrome)      79-year-old woman with vertigo. No clear neurologic etiology. Her imaging is completely benign without any evidence to suggest demyelination or stroke. No ischemic issues. Vascular imaging is normal.  She is still undergoing treatment from cardiology for her pots. We will see if that has any impact on improvement. She is going to see ENT as well. There seems to be a an exquisite positional component suggestive of a peripheral process. As far as her nightly myalgias. Recommend she start taking magnesium at bedtime. Follow-up as needed.         [address] [facility], [address]  Diplomate ABPN

## 2017-10-31 NOTE — MR AVS SNAPSHOT
Visit Information Derrickmojgankilo Arreolaalphonso Burnett Personal Médico Departamento Teléfono del Dep. Número de visita 10/31/2017  3:20  [facility] [address], [address] Neurology Clinic at [address] [phone] Follow-up Instructions Return in about 2 months (around 12/31/2017), or if symptoms worsen or fail to improve. Your Appointments 11/9/2017  2:40 PM  
ESTABLISHED PATIENT with Conner Palmer MD  
[facility] ([address]) Appt Note: 2 week f/u tilt table [address]  [address] [place] [address]  
[address] [address]  
  
    
 12/26/2017 11:40 AM  
New Patient with Gino Jones MD  
[facility] ([address]) Appt Note: NP refd by Dr. Bladimir Amaro for tachycardia_ [facility] [address] PHONE NEEDED)  
 [address] [facility] [address] [phone]  
[phone]  
  
   
 [address] [address]  
  
    
 2/5/2018 11:20 AM  
ESTABLISHED PATIENT with Bladimir Amaro MD  
[facility] ([facility] Appt Note: 4 month f/u  
 [address] [facility] [address] [facility] [address]  
[address] [facility]  
  
    
 3/23/2018 11:50 AM  
Follow Up with Fazal Frederick MD  
[facility] [address]) Appt Note: 6 month f/u  
 [address] [facility] [address] [phone] [address] [facility] Upcoming Health Maintenance Date Due DTaP/Tdap/Td series (1 - Tdap) 8/8/2000 PAP AKA CERVICAL CYTOLOGY 8/8/2000 INFLUENZA AGE 9 TO ADULT 8/1/2017 [place]  Review Complete El: 10/31/2017 Por: Jazlyn Li LPN A partir del:  10/31/2017 No Known Allergies Vacunas actuales Rubina Angulo No hay ninguna vacuna archivada. No revisadas esta visita You Were Diagnosed With   
  
 Herlinda Nip Vertigo    -  Primary ICD-10-CM: G05 ICD-9-CM: 780.4 POTS (postural orthostatic tachycardia syndrome)     ICD-10-CM: R00.0, I95.1 ICD-9-CM: 427.89 Partes vitales Estado obstétrico Estatus de tabaquísmo Having regular periods Never Smoker [facility] Name Phone [facility]egra [facility] [address]  [place] [phone] Aggarwal lista de medicamentos actualizada Lista actualizada el: 10/31/17  3:37 PM.  Renay Glad use aggarwal lista de medicamentos más reciente. biotin 1,000 mcg Chew Take  by mouth.  
  
 propranolol LA 60 mg SR capsule También conocido kyle:  INDERAL LA Take 1 Cap by mouth nightly. Indications: POTS Instrucciones de seguimiento Return in about 2 months (around 12/31/2017), or if symptoms worsen or fail to improve. Instrucciones para el Paciente A Healthy Lifestyle: Care Instructions Your Care Instructions A healthy lifestyle can help you feel good, stay at a healthy weight, and have plenty of energy for both work and play. A healthy lifestyle is something you can share with your whole family. A healthy lifestyle also can lower your risk for serious health problems, such as high blood pressure, heart disease, and diabetes. You can follow a few steps listed below to improve your health and the health of your family. Follow-up care is a key part of your treatment and safety. Be sure to make and go to all appointments, and call your doctor if you are having problems. It's also a good idea to know your test results and keep a list of the medicines you take. How can you care for yourself at home? · Do not eat too much sugar, fat, or fast foods.  You can still have dessert and treats now and then. The goal is moderation. · Start small to improve your eating habits. Pay attention to portion sizes, drink less juice and soda pop, and eat more fruits and vegetables. ¨ Eat a healthy amount of food. A 3-ounce serving of meat, for example, is about the size of a deck of cards. Fill the rest of your plate with vegetables and whole grains. ¨ Limit the amount of soda and sports drinks you have every day. Drink more water when you are thirsty. ¨ Eat at least 5 servings of fruits and vegetables every day. It may seem like a lot, but it is not hard to reach this goal. A serving or helping is 1 piece of fruit, 1 cup of vegetables, or 2 cups of leafy, raw vegetables. Have an apple or some carrot sticks as an afternoon snack instead of a candy bar. Try to have fruits and/or vegetables at every meal. 
· Make exercise part of your daily routine. You may want to start with simple activities, such as walking, bicycling, or slow swimming. Try to be active 30 to 60 minutes every day. You do not need to do all 30 to 60 minutes all at once. For example, you can exercise 3 times a day for 10 or 20 minutes. Moderate exercise is safe for most people, but it is always a good idea to talk to your doctor before starting an exercise program. 
· Keep moving. Jessie Boys the lawn, work in the garden, or Jixee. Take the stairs instead of the elevator at work. · If you smoke, quit. People who smoke have an increased risk for heart attack, stroke, cancer, and other lung illnesses. Quitting is hard, but there are ways to boost your chance of quitting tobacco for good. ¨ Use nicotine gum, patches, or lozenges. ¨ Ask your doctor about stop-smoking programs and medicines. ¨ Keep trying.  
In addition to reducing your risk of diseases in the future, you will notice some benefits soon after you stop using tobacco. If you have shortness of breath or asthma symptoms, they will likely get better within a few weeks after you quit. · Limit how much alcohol you drink. Moderate amounts of alcohol (up to 2 drinks a day for men, 1 drink a day for women) are okay. But drinking too much can lead to liver problems, high blood pressure, and other health problems. Family health If you have a family, there are many things you can do together to improve your health. · Eat meals together as a family as often as possible. · Eat healthy foods. This includes fruits, vegetables, lean meats and dairy, and whole grains. · Include your family in your fitness plan. Most people think of activities such as jogging or tennis as the way to fitness, but there are many ways you and your family can be more active. Anything that makes you breathe hard and gets your heart pumping is exercise. Here are some tips: 
¨ Walk to do errands or to take your child to school or the bus. ¨ Go for a family bike ride after dinner instead of watching TV. Where can you learn more? Go to http://jareth-femi.info/. Enter A953 in the search box to learn more about \"A Healthy Lifestyle: Care Instructions. \" Current as of: May 12, 2017 Content Version: 11.4 © 6525-3941 [employer]. Care instructions adapted under license by [employer] (which disclaims liability or warranty for this information). If you have questions about a medical condition or this instruction, always ask your healthcare professional. [place] [address] any warranty or liability for your use of this information. Introducing \[facility] [place] [facility]\"" & HEALTH SERVICES! [facility] introduce portal paciente MyChart . Ahora se puede acceder a partes de glass expediente médico, enviar por correo electrónico la oficina de glass médico y solicitar renovaciones de medicamentos en línea. En glass navegador de Internet , Joseph Arreola a [email or web address] Alisson clic en el usuario por [place]?  Keisha Rigoberto clic aquí en la [place]orse [place]. Verá la página de registro [place]. Ingrese glass código de [employer] diamond y kyle aparece a continuación. Usted no tendrá que [employer] código después de jackie completado el proceso de registro . Si usted no se inscribe antes de la fecha de caducidad , debe solicitar un nuevo código. · MyChart Código de acceso : [id number] Expires: 1/29/2018  3:09 PM 
 
Ingresa los últimos cuatro dígitos de glass Número de Seguro Social ( xxxx ) y fecha de nacimiento ( dd / mm / aaaa ) kyle se indica y alisson clic en Enviar. Usted será llevado a la siguiente página de registro . Crear un ID MyChart . Esta será glass ID de inicio de sesión de MyChart y no puede ser [place] , por lo que pensar en buck que es Yinka Leeroy y fácil de recordar . Crear buck contraseña MyChart . Usted puede cambiar glass contraseña en cualquier momento . Ingrese glass Password Reset de preguntas y [place] . [facility] se puede utilizar en un momento posterior si usted olvida glass contraseña. Introduzca glass dirección de correo electrónico . Desiree Spence recibirá buck notificación por correo electrónico cuando la nueva información está disponible en MyChart . Immanuel Pillar clic en Registrarse. Di Fabrizio marija y descargar porciones de glass expediente médico. 
Alisson clic en el enlace de descarga del menú Resumen para descargar buck copia portátil de glass información médica . Si tiene [employer] , por favor visite la sección de preguntas frecuentes del sitio web MyChart . Recuerde, MyChart NO es que se utilizará para las necesidades urgentes. Para emergencias médicas , llame al 911 . Ahora disponible en glass iPhone y Android ! Por favor proporcione omari resumen de la documentación de cuidado a glass próximo proveedor. Your primary care clinician is listed as Myverónicada Fontan. If you have any questions after today's visit, please call [phone].

## 2017-10-31 NOTE — COMMUNICATION BODY
Chief Complaint   Patient presents with    Dizziness       HPI    Jorge Ingram is a 65-year-old woman here to follow-up on vertigo. Since our last visit she completed an MRI brain which is completely normal.  She completed an MRA as well which was also very normal.  She also saw cardiology who formally diagnosed her with pots. She started propranolol today. She still having vertiginous symptoms that are usually triggered with position changes. No unusual headaches. Sometimes she feels near syncopal.  She is also complaining of nightly cramps and myalgias. Review of Systems   Cardiovascular: Positive for palpitations. Gastrointestinal: Positive for nausea. Musculoskeletal: Positive for myalgias. Neurological: Positive for dizziness. All other systems reviewed and are negative. Past Medical History:   Diagnosis Date    Hypertension     Multiple thyroid nodules     Palpitations     Thyroid nodule      Family History   Problem Relation Age of Onset    Kidney Disease Mother      MARCUS    Hypertension Father     Diabetes Father     Kidney Disease Maternal Grandmother     Hypertension Maternal Grandmother     Cancer Maternal Grandfather      sto,acj    Hypertension Maternal Grandfather     No Known Problems Paternal Grandmother     No Known Problems Paternal Grandfather     No Known Problems Sister     No Known Problems Brother     Thyroid Disease Cousin      x4    Thyroid Cancer Neg Hx      Social History     Social History    Marital status: SINGLE     Spouse name: N/A    Number of children: N/A    Years of education: N/A     Occupational History    Not on file.      Social History Main Topics    Smoking status: Never Smoker    Smokeless tobacco: Never Used    Alcohol use No    Drug use: No    Sexual activity: Not on file     Other Topics Concern    Not on file     Social History Narrative    ** Merged History Encounter **          No Known Allergies      Current Outpatient Prescriptions   Medication Sig    propranolol LA (INDERAL LA) 60 mg SR capsule Take 1 Cap by mouth nightly. Indications: POTS    biotin 1,000 mcg chew Take  by mouth. No current facility-administered medications for this visit. Neurologic Exam     Mental Status        WD/WN adult in NAD, normal grooming  VSS  A&O x 3    PERRL, nonicteric  Face is symmetric, tongue midline  Speech is fluent and clear  No limb ataxia. No abnl movements. Moving all extemities spontaneously and symmetric  Normal gait    CVS RRR  Lungs nonlabored  Skin is warm and dry         There were no vitals taken for this visit. Assessment and Plan   Diagnoses and all orders for this visit:    1. Vertigo    2. POTS (postural orthostatic tachycardia syndrome)      74-year-old woman with vertigo. No clear neurologic etiology. Her imaging is completely benign without any evidence to suggest demyelination or stroke. No ischemic issues. Vascular imaging is normal.  She is still undergoing treatment from cardiology for her pots. We will see if that has any impact on improvement. She is going to see ENT as well. There seems to be a an exquisite positional component suggestive of a peripheral process. As far as her nightly myalgias. Recommend she start taking magnesium at bedtime. Follow-up as needed.         Yeison Perez, [address]  Diplomate ABPN

## 2017-10-31 NOTE — PATIENT INSTRUCTIONS

## 2017-11-09 ENCOUNTER — OFFICE VISIT (OUTPATIENT)
Dept: CARDIOLOGY CLINIC | Age: 37
End: 2017-11-09

## 2017-11-09 VITALS
DIASTOLIC BLOOD PRESSURE: 78 MMHG | BODY MASS INDEX: 22.68 KG/M2 | HEART RATE: 92 BPM | HEIGHT: 63 IN | OXYGEN SATURATION: 97 % | RESPIRATION RATE: 18 BRPM | WEIGHT: 128 LBS | SYSTOLIC BLOOD PRESSURE: 110 MMHG

## 2017-11-09 DIAGNOSIS — R42 DIZZY: ICD-10-CM

## 2017-11-09 DIAGNOSIS — G90.A POTS (POSTURAL ORTHOSTATIC TACHYCARDIA SYNDROME): Primary | ICD-10-CM

## 2017-11-09 DIAGNOSIS — R00.0 SINUS TACHYCARDIA: ICD-10-CM

## 2017-11-09 DIAGNOSIS — R94.09 ABNORMAL TILT TABLE TEST: ICD-10-CM

## 2017-11-09 NOTE — PROGRESS NOTES
Cardiac Electrophysiology Office Note     Subjective:      Kim Khan is a 44 y.o. patient who is seen for evaluation of Palpitations, dizziness when she stands up  She gets shortness of breaths with this  She could not take beta blocker metoprolol due to fatigue  cardizem bottomed out BP  She has stopped it  Echo was normal.  ECG normal  holter sinus tach at times to 143 bpm, PVCs and PAC     Dr Chen Ritter referred her   I recommended and did tilt test for her  She had POTS and I recommended long acting inderal at night  She came back today and said she felt much better  I used interpreting line with her and Milagros Estrada helped to translate even though she does speak some English and understands most of it      Patient Active Problem List   Diagnosis Code    Subclinical hyperthyroidism E05.90    Multinodular goiter E04.2    POTS (postural orthostatic tachycardia syndrome) R00.0, I95.1    Dizziness R42    Heart palpitations R00.2     Current Outpatient Prescriptions   Medication Sig Dispense Refill    propranolol LA (INDERAL LA) 60 mg SR capsule Take 1 Cap by mouth nightly. Indications: POTS 30 Cap 1    biotin 1,000 mcg chew Take  by mouth.        No Known Allergies  Past Medical History:   Diagnosis Date    Hypertension     Multiple thyroid nodules     Palpitations     Thyroid nodule      No past surgical history   Family History   Problem Relation Age of Onset    Kidney Disease Mother      MARCUS    Hypertension Father     Diabetes Father     Kidney Disease Maternal Grandmother     Hypertension Maternal Grandmother     Cancer Maternal Grandfather      sto,acj    Hypertension Maternal Grandfather     No Known Problems Paternal Grandmother     No Known Problems Paternal Grandfather     No Known Problems Sister     No Known Problems Brother     Thyroid Disease Cousin      x4    Thyroid Cancer Neg Hx      Social History   Substance Use Topics    Smoking status: Never Smoker    Smokeless tobacco: Never Used    Alcohol use No        Review of Systems:   Constitutional: Negative for fever, chills, weight loss, malaise/fatigue. HEENT: Negative for nosebleeds, vision changes. Respiratory: Negative for cough, hemoptysis  Cardiovascular: Negative for chest pain, no orthopnea, claudication, leg swelling, syncope, and PND. Gastrointestinal: Negative for nausea, vomiting, diarrhea, blood in stool and melena. Genitourinary: Negative for dysuria, and hematuria. Musculoskeletal: Negative for myalgias, arthralgia. Skin: Negative for rash. Heme: Does not bleed or bruise easily. Neurological: Negative for speech change and focal weakness     Objective:     Visit Vitals    /78 (BP 1 Location: Right arm, BP Patient Position: Sitting)    Pulse 92    Resp 18    Ht 5' 3\" (1.6 m)    Wt 128 lb (58.1 kg)    SpO2 97%    BMI 22.67 kg/m2      Physical Exam:   Constitutional: well-developed and well-nourished. No respiratory distress. Head: Normocephalic and atraumatic. Eyes: Pupils are equal, round  ENT: hearing normal  Neck: supple. No JVD present. Cardiovascular: Normal rate, regular rhythm. Exam reveals no gallop and no friction rub. No murmur heard. Pulmonary/Chest: Effort normal and breath sounds normal. No wheezes. Abdominal: Soft, no tenderness. Musculoskeletal: no edema. Neurological: alert,oriented. Skin: Skin is warm and dry  Psychiatric: normal mood and affect. Behavior is normal. Judgment and thought content normal.         Assessment/Plan:       ICD-10-CM ICD-9-CM    1. POTS (postural orthostatic tachycardia syndrome) R00.0 427.89     I95.1     2. Dizzy R42 780.4    3. Sinus tachycardia R00.0 427.89    4. Abnormal tilt table test R94.09 794.09      reviewed diet, exercise and weight control  reviewed medications and side effects in detail   We discussed tilt table test result and her diagnosis.   She is now able to feel better and able to work  Sinus rate is still a bit fast but she is happy with the dose of medication  She asked if she can just follow up with me. I recommend her see Dr Chepe Cee in 6 months and if she wants to I can see her in a year  Dr Hudson Mendoza referred her to ENT for vertigo but she said she is so much better now that she does not want to see ENT. I think it is ok based on how she felt so far      Thank you for involving me in this patient's care and please call with further concerns or questions. Marlene Coronado M.D.   Electrophysiology/Cardiology  [facility] [place]  [facility] [address], [address] [place] [address]  [phone]

## 2017-11-09 NOTE — PATIENT INSTRUCTIONS
You will need to follow up in clinic with Dr. Rodríguez Matthews in 6 months. You will need to follow up in clinic with Dr. Minor Gonzales in 1 year.

## 2017-11-09 NOTE — MR AVS SNAPSHOT
Visit Information Ruthy Jennings y Hortencia Personal Médico Departamento Teléfono del Dep. Número de visita 11/9/2017  2:40 PM Scarlet Pimentel MD [facility] Kyra Duenas [phone] Your Appointments 12/26/2017 11:40 AM  
New Patient with Gal Shea MD  
[address] [facility] ([address]) Appt Note: NP refd by Dr. Manjeet Hernandez for tachycardia_ [facility] ([address] PHONE NEEDED)  
 [address] [place] [address] [address]  
[place] [address] [place] [address]  
  
    
 12/29/2017  1:40 PM  
Follow Up with [address] [place], [place] [address]DO [facility] at [address]) Appt Note: 2 month f/u $0cp 10/31/17 [address] [place], [place] [address]  
[phone]  
  
   
 [address] [place] [address]  
  
    
 2/5/2018 11:20 AM  
ESTABLISHED PATIENT with Manjeet Hernandez MD  
[facility] ([address] [place] [address]) Appt Note: 4 month f/u  
 [address] Replaced by [facility] [id number]  
[address] [facility]  
  
    
 3/23/2018 11:50 AM  
Follow Up with Roberta Saldaña MD  
[facility] [address] [place] [address]) Appt Note: 6 month f/u  
 [address] [address] [address] [place] [address] 5/10/2018 11:20 AM  
ESTABLISHED PATIENT with Scarlet Pimentel MD  
[facility] ([address] [place] [address]) Appt Note: 6 mnth f/u  
 [address] [place] [address]  
[address] [address] Upcoming Health Maintenance Date Due DTaP/Tdap/Td series (1 - Tdap) 8/8/2000 PAP AKA CERVICAL CYTOLOGY 8/8/2000 Influenza Age 5 to Adult 8/1/2017 Alergias  Review Complete El: 11/9/2017 Por: MD Inessa Adams del:  11/9/2017 No Known Allergies Vacunas actuales Coit Velia No hay ninguna vacuna archivada. No revisadas esta visita You Were Diagnosed With   
  
 Mammie Romberg POTS (postural orthostatic tachycardia syndrome)    -  Primary ICD-10-CM: R00.0, I95.1 ICD-9-CM: 427.89 Dizzy     ICD-10-CM: R44 ICD-9-CM: 780.4 Sinus tachycardia     ICD-10-CM: R00.0 ICD-9-CM: 427.89 Abnormal tilt table test     ICD-10-CM: R94.09 
ICD-9-CM: 794.09 Partes vitales PS Pulso Resp Caro ( percentil de crecimiento) Peso (percentil de crecimiento) SpO2  
 110/78 (BP 1 Location: Right arm, BP Patient Position: Sitting) 92 18 5' 3\" (1.6 m) 128 lb (58.1 kg) 97% BMI [facility]) Estado obstétrico Estatus de tabaquísmo 22.67 kg/m2 Having regular periods Never Smoker Historial de signos vitales BMI and BSA Data Body Mass Index Body Surface Area  
 22.67 kg/m 2 1.61 m 2 Mallory Isidro Pharmacy Name Phone [address] [place] [address] [address] Castaneda  [place] [phone] Aggarwal lista de medicamentos actualizada Lista actualizada el: 11/9/17  2:43 PM.  Asher Rao use aggarwal lista de medicamentos más reciente. biotin 1,000 mcg Chew Take  by mouth.  
  
 propranolol LA 60 mg SR capsule También conocido kyle:  INDERAL LA Take 1 Cap by mouth nightly. Indications: POTS Instrucciones para el Paciente You will need to follow up in clinic with Dr. Rome Omer in 6 months. You will need to follow up in clinic with Dr. Ish Diop in 1 year. Introducing [facility]! [facility] introduce portal paciente MyChart . Ahora se puede acceder a partes de aggarwal expediente médico, enviar por correo electrónico la oficina de aggarwal médico y solicitar renovaciones de medicamentos en línea. En glass navegador de Internet , Joseph Arreola a [email or web address] Shyanne clic en el usuario por Frank Ware? Keisha Rigoberto clic aquí en la sesión Rachel Jane. Verá la página de registro [place]. Ingrese glass código de [employer] diamond y kyle aparece a continuación. Usted no tendrá que [employer] código después de jackie completado el proceso de registro . Si usted no se inscribe antes de la fecha de caducidad , debe solicitar un nuevo código. · MyChart Código de acceso : [id number] Expires: 1/29/2018  2:09 PM 
 
Ingresa los últimos cuatro dígitos de glass Número de Seguro Social ( xxxx ) y fecha de nacimiento ( dd / mm / aaaa ) kyle se indica y shyanne clic en Enviar. Usted será llevado a la siguiente página de registro . Crear un ID MyChart . Esta será glass ID de inicio de sesión de MyChart y no puede ser [place] , por lo que pensar en buck que es Renetta Prim y fácil de recordar . Crear buck contraseña MyChart . Usted puede cambiar glass contraseña en cualquier momento . Ingrese glass Password Reset de preguntas y [place] . [place] se puede utilizar en un momento posterior si usted olvida glass contraseña. Introduzca glass dirección de correo electrónico . [place] recibirá buck notificación por correo electrónico cuando la nueva información está disponible en MyChart . Cassie Aures clic en Registrarse. Irl Vale marija y descargar porciones de glass expediente médico. 
Shyanne clic en el enlace de descarga del menú Resumen para descargar buck copia portátil de glass información médica . Si tiene [employer] , por favor visite la sección de preguntas frecuentes del sitio web MyChart . Recuerde, MyChart NO es que se utilizará para las necesidades urgentes. Para emergencias médicas , llame al 911 . Ahora disponible en glass iPhone y Android ! Por favor proporcione omari resumen de la documentación de cuidado a glass próximo proveedor. Your primary care clinician is listed as Benito Torres.  If you have any questions after today's visit, please call [phone].

## 2018-01-25 ENCOUNTER — OFFICE VISIT (OUTPATIENT)
Dept: CARDIOLOGY CLINIC | Age: 38
End: 2018-01-25

## 2018-01-25 VITALS
SYSTOLIC BLOOD PRESSURE: 130 MMHG | OXYGEN SATURATION: 98 % | WEIGHT: 133.6 LBS | DIASTOLIC BLOOD PRESSURE: 80 MMHG | HEART RATE: 88 BPM | HEIGHT: 63 IN | BODY MASS INDEX: 23.67 KG/M2

## 2018-01-25 DIAGNOSIS — R00.2 HEART PALPITATIONS: ICD-10-CM

## 2018-01-25 DIAGNOSIS — R07.9 CHEST PAIN, UNSPECIFIED TYPE: Primary | ICD-10-CM

## 2018-01-25 DIAGNOSIS — G90.A POTS (POSTURAL ORTHOSTATIC TACHYCARDIA SYNDROME): ICD-10-CM

## 2018-01-25 DIAGNOSIS — R42 DIZZINESS: ICD-10-CM

## 2018-01-25 RX ORDER — IBUPROFEN 200 MG
400 TABLET ORAL
Qty: 30 TAB | Refills: 0 | Status: SHIPPED | OUTPATIENT
Start: 2018-01-25 | End: 2018-04-19

## 2018-01-25 RX ORDER — PROPRANOLOL HYDROCHLORIDE 60 MG/1
60 CAPSULE, EXTENDED RELEASE ORAL
Qty: 30 CAP | Refills: 5 | Status: SHIPPED | OUTPATIENT
Start: 2018-01-25 | End: 2018-02-15 | Stop reason: SDUPTHER

## 2018-01-25 NOTE — PROGRESS NOTES
Ibuprofen 200 mg, 2 tabs TID ordered    Refills for propranolol sent    CXR ordered    Verbal order per Dr. Latasha Solis

## 2018-01-25 NOTE — PROGRESS NOTES
Nguyễn Palm     1980       Mattie Ridley MD, [facility]  Date of Visit-1/25/2018   PCP is Sheryle Drilling, MD   [facility] [place]  [facility]  HPI:  Nguyễn Palm is a 44 y.o. female   established pt here for 4 month follow up seen for tachycardia, we had tried her on metoprolol and suggest flecainide for the tachycardia, she had an echo in September which was normal and a holter in September which showed a few ectopics with persistence of tachycardia we recommended she see electrophysiology as she had some dizziness upon standing up. She had low BP and headaches with diltiazem, she was seen by Dr. Waldo Pickens on 10/10 and he recommended a tilt table test. She underwent the tilt table test on 10/27/17, her HR abruptly increased to 138 and the blood pressure also elevated, Dr. Waldo Pickens darline that she had POTS. Since she could not tolerate previous metoprolol or Cardizem she was placed on inderal at night and she sees Dr. Nanette Kennedy for endocrinology. Her pulse is 88 today. Since then pt went to [facility] on 12/4/17 for what sounded like costochondritis, she had a normal CBC and chemistry.  #[id number]     The pt states that she is not feeling well. The pt reports that she went to the [address] [place] [address] ED 8 weeks ago because she was having chest pain. She was told that she had costochondritis. The pt was not given anything for this pain and it went away on its own. She reports that 2 days ago she started to have mid sternal chest pain again that radiated to her back and down to her left pointer finger. The pt denies any chest pain when she takes a deep breath or exerts herself. The pt states that this pain is different than what she was feeling before. She states that she fell a few years ago and states this may be the cause of her pain at this time. In order to alleviate this pain she has been taking tylenol.      The pt also states that she has been feeling very dizzy upon standing and like she might pass out. She reports that when she was in the ED at [facility] they told her to stop her propanolol. The pt notes that the inderal helped alleviate her palpitations and since she has stopped taking it she has been having episodes of tachycardia again when she walks and stands up. The pt reports that she was feeling better when she was on the propanolol. She states that she will be seeing neurology tomorrow. Denies edema, syncope or shortness of breath at rest.  Assessment/Plan:     1. Chest pain occurred 8 weeks ago resolved on its own has come back in the last 2 days with some tingling in the left finger and radiation to the back. Would seem unlikely to be CAD given her age and lack of exertional symptoms. I suspect with is musculoskeletal, I have recommended ibuprofen and a chest xray. If her pain persists she should see her PCP, there is no finding on physical exam that is abnormal from the CV point of view. 2. POTS, was told in the ER to stop it by one of the 3 doctors she saw it, not sure if that was an offhand comment or a plan as we have no records, I would have her restart her inderal as she seemed to feel better back on that and she has a follow up with Dr. Silvano Ortiz in May. 3. Hyperthyroidism, followed by endocrinology. 4. Overall pt appeared well, in no acute distress. Patient Instructions   There were changes made to your medications at this appointment. Please note the following:  START Ibuprofen 200 mg, 2 tab 3X a day as needed for chest pain  Take for 3-5 days, if Chest pain continues please follow up with primary care    Please get a chest Xray     Key CAD CHF Meds             propranolol LA (INDERAL LA) 60 mg SR capsule  (Taking/Discontinued) Take 1 Cap by mouth nightly. Indications: POTS            Impression:   1. Chest pain, unspecified type    2. POTS (postural orthostatic tachycardia syndrome)    3. Dizziness    4.  Heart palpitations       Cardiac History:   ECHO 9-13-17 WNL   HOLTER 9-13-17 rare sinus arrhythmia ; 26 PACs, rare PVCs; sinus at  no prolonged tachycardia      ROS-except as noted above. . A complete cardiac and respiratory are reviewed and negative except as above ; Resp-denies wheezing  or productive cough,. Const- No unusual weight loss or fever; Neuro-no recent seizure or CVA ; GI- No BRBPR, abdom pain, bloating ; - no  hematuria   see supplement sheet, initialed and to be scanned by staff  Past Medical History:   Diagnosis Date    Hypertension     Multiple thyroid nodules     Palpitations     Thyroid nodule       Social Hx= reports that she has never smoked. She has never used smokeless tobacco. She reports that she does not drink alcohol or use illicit drugs. Exam and Labs:  /80 (BP 1 Location: Left arm, BP Patient Position: Sitting)  Pulse 88  Ht 5' 3\" (1.6 m)  Wt 133 lb 9.6 oz (60.6 kg)  SpO2 98%  BMI 23.67 kg/y1Rboyfsmaiwexlx:  NAD, comfortable  Head: NC,AT. Eyes: No scleral icterus. Neck:  Neck supple. No JVD present. Throat: moist mucous membranes. Chest: Effort normal & normal respiratory excursion . Neurological: alert, conversant and oriented . Skin: Skin is not cold. No obvious systemic rash noted. Not diaphoretic. No erythema. Psychiatric:  Grossly normal mood and affect. Behavior appears normal. Extremities:  no clubbing or cyanosis. Abdomen: non distended    Lungs:breath sounds normal. No stridor. distress, wheezes or  Rales. Heart: normal rate, regular rhythm, normal S1, S2, no murmurs, rubs, clicks or gallops , PMI non displaced. Edema: Edema is none.     Lab Results   Component Value Date/Time    Sodium 143 10/26/2017 09:42 AM    Potassium 4.2 10/26/2017 09:42 AM    Chloride 102 10/26/2017 09:42 AM    CO2 27 10/26/2017 09:42 AM    Anion gap 7 08/21/2017 07:18 PM    Glucose 79 10/26/2017 09:42 AM    BUN 11 10/26/2017 09:42 AM    Creatinine 0.64 10/26/2017 09:42 AM    BUN/Creatinine ratio 17 10/26/2017 09:42 AM    GFR est  10/26/2017 09:42 AM    GFR est non- 10/26/2017 09:42 AM    Calcium 9.9 10/26/2017 09:42 AM      Wt Readings from Last 3 Encounters:   01/25/18 133 lb 9.6 oz (60.6 kg)   11/09/17 128 lb (58.1 kg)   10/27/17 128 lb (58.1 kg)      BP Readings from Last 3 Encounters:   01/25/18 130/80   11/09/17 110/78   10/27/17 129/41      Current Outpatient Prescriptions   Medication Sig    biotin 1,000 mcg chew Take  by mouth.  propranolol LA (INDERAL LA) 60 mg SR capsule Take 1 Cap by mouth nightly. Indications: POTS     No current facility-administered medications for this visit. Impression see above.       Written by Phong Fisher, as dictated by Alvaro New MD.

## 2018-01-25 NOTE — MR AVS SNAPSHOT
[address] [facility] [address] [place] [address] 
[phone] Patient: Holger Rizvi MRN: [id number] Brittany Mohr Visit Information [place] Willy Burnett Personal Médico Departamento Teléfono del Dep. Número de visita 1/25/2018  9:40 AM Breanna Diaz MD [facility] [phone] Your Appointments 1/26/2018 10:20 AM  
Follow Up with Yulisa Gonzalez DO [facility] Neurology Clinic at [facility] [address] [facility]) Appt Note: 2 month f/u $0cp 10/31/17 JR; f/u 12/15/2017 CW; f/u 12/20/2017 CS; f/u 1/17/18 [id number] [address] [address]  
[phone]  
  
   
 [address] [place] [address]  
  
    
 2/1/2018 11:40 AM  
New Patient with Stewart Gonsalves MD  
[address] [facility] ([address]) Appt Note: NP refd by Dr. Breanna Diaz for tachycardia_ [facility] (BLUE PHONE NEEDED); NP refd by Dr. Breanna Diaz for tachycardia_ [facility] ([address] PHONE NEEDED)  
 [address]  
[phone]  
  
   
 [address] [place] [address] [address] [address]  
  
    
 3/22/2018 10:50 AM  
Follow Up with Fredy Singh MD  
[address] [place] [address] Diabetes and Endocrinology [address] [facility]) Appt Note: Follow up thyroid; Follow up thyroid [address] [phone] [address] [place] [address] Road 5/10/2018 11:20 AM  
ESTABLISHED PATIENT with Cornelius Lindo MD  
[facility] ([address]) Appt Note: 6 mnth f/u  
 [address] [place] [address]  
[address] [address] Upcoming Health Maintenance Date Due DTaP/Tdap/Td series (1 - Tdap) 8/8/2000 PAP AKA CERVICAL CYTOLOGY 8/8/2000 Influenza Age 5 to Adult 8/1/2017 [place]  Review Complete El: 1/25/2018 Por: Shelia Matthews LPN A partir del:  1/25/2018 No Known Allergies Vacunas actuales Brittany Lebron No hay ninguna vacuna archivada. No revisadas esta visita You Were Diagnosed With   
  
 Mazin Bath Chest pain, unspecified type    -  Primary ICD-10-CM: R07.9 ICD-9-CM: 786.50 Partes vitales PS Pulso [place] ( percentil de crecimiento) Peso (percentil de crecimiento) SpO2 BMI (IM)  
 130/80 (BP 1 Location: Left arm, BP Patient Position: Sitting) 88 5' 3\" (1.6 m) 133 lb 9.6 oz (60.6 kg) 98% 23.67 kg/m2 Estado obstétrico Estatus de tabaquísmo Having regular periods Never Smoker BMI and BSA Data Body Mass Index Body Surface Area  
 23.67 kg/m 2 1.64 m 2 [place]  Pharmacy Name Phone [address]  [place] [phone] Glass lista de medicamentos actualizada Lista actualizada el: 1/25/18 10:43 AM.  Galilea Derek use glass lista de medicamentos más reciente. biotin 1,000 mcg Chew Take  by mouth. ibuprofen 200 mg tablet También conocido kyle:  MOTRIN Take 2 Tabs by mouth every eight (8) hours as needed for Pain.  
  
 propranolol LA 60 mg SR capsule También conocido kyle:  INDERAL LA Take 1 Cap by mouth nightly. Indications: POTS Recetas Enviado a la Annamaria Refills  
 ibuprofen (MOTRIN) 200 mg tablet 0 Sig: Take 2 Tabs by mouth every eight (8) hours as needed for Pain. Class: Normal  
 [facility], [address] [place] [address] #: [phone] Route: Oral  
 propranolol LA (INDERAL LA) 60 mg SR capsule 5 Sig: Take 1 Cap by mouth nightly. Indications: POTS  Class: Normal  
 Pharmacy: [facility] [address] #: [phone] Route: Oral  
  
Por hacer 01/25/2018 Imaging:  XR CHEST PA LAT Instrucciones para el Paciente There were changes made to your medications at this appointment. Please note the following: START Ibuprofen 200 mg, 2 tab 3X a day as needed for chest pain Take for 3-5 days, if Chest pain continues please follow up with primary care Please get a chest Xray Introducing [facility]! [facility] introduce portal paciente MyChart . Ahora se puede acceder a partes de glass expediente médico, enviar por correo electrónico la oficina de glass médico y solicitar renovaciones de medicamentos en línea. En glass navegador de Internet , Leroy Handy a [email or web address] Shyanne clic en el usuario por Garcia Mayers? Chilo Sena clic aquí en la sesión Koreen Handsome. Verá la página de registro [place]. Ingrese glass código de [employer] y kyle aparece a continuación. Usted no tendrá que [employer] código después de jackie completado el proceso de registro . Si usted no se inscribe antes de la fecha de caducidad , debe solicitar un nuevo código. · MyChart Código de acceso : [id number] Expires: 1/29/2018  2:09 PM 
 
Ingresa los últimos cuatro dígitos de glass Número de Seguro Social ( xxxx ) y fecha de nacimiento ( dd / mm / aaaa ) kyle se indica y shyanne clic en Enviar. Usted será llevado a la siguiente página de registro . Crear un ID MyChart . Esta será glass ID de inicio de sesión de MyChart y no puede ser [place] , por lo que pensar en buck que es Francois Carp y fácil de recordar . Crear buck contraseña MyChart . Usted puede cambiar glass contraseña en cualquier momento . Ingrese glass Password Reset de preguntas y [place] . [place] se puede utilizar en un momento posterior si usted olvida glass contraseña. Introduzca glass dirección de correo electrónico . Sushila Comings recibirá buck notificación por correo electrónico cuando la nueva información está disponible en MyChart . Rajne Aubree mancera en Registrarse. Kamran Carolina marija y descargar porciones de glass expediente médico. 
Alisson fiordaliza en el enlace de descarga del menú Resumen para descargar buck copia portátil de glass información médica . Si tiene [employer] , por favor visite la sección de preguntas frecuentes del sitio web [email or web address] . Recuerde, [employer] NO es que se utilizará para las necesidades urgentes. Para emergencias médicas , llame al 911 . Ahora disponible en glass iPhone y Android ! Por favor proporcione omari resumen de la documentación de cuidado a glass próximo proveedor. Your primary care clinician is listed as Jo-Ann Otto. If you have any questions after today's visit, please call [phone].

## 2018-01-26 ENCOUNTER — OFFICE VISIT (OUTPATIENT)
Dept: NEUROLOGY | Age: 38
End: 2018-01-26

## 2018-01-26 VITALS
DIASTOLIC BLOOD PRESSURE: 80 MMHG | HEIGHT: 63 IN | OXYGEN SATURATION: 97 % | BODY MASS INDEX: 23.39 KG/M2 | HEART RATE: 75 BPM | RESPIRATION RATE: 14 BRPM | WEIGHT: 132 LBS | SYSTOLIC BLOOD PRESSURE: 130 MMHG

## 2018-01-26 DIAGNOSIS — R42 VERTIGO: ICD-10-CM

## 2018-01-26 DIAGNOSIS — G43.709 CHRONIC MIGRAINE W/O AURA W/O STATUS MIGRAINOSUS, NOT INTRACTABLE: Primary | ICD-10-CM

## 2018-01-26 RX ORDER — TOPIRAMATE 25 MG/1
25 TABLET ORAL 2 TIMES DAILY
Qty: 60 TAB | Refills: 3 | Status: SHIPPED | OUTPATIENT
Start: 2018-01-26 | End: 2018-02-15

## 2018-01-26 NOTE — LETTER
1/26/2018 Patient:  Flaca Serna YOB: 1980 Date of Visit: 1/26/2018 Dear Maxime Calzada MD 
[address] [place] [id number] VIA Facsimile: [phone] 
 : I was requested by Maxime Calzada MD to evaluate Ms. Flaca Serna  for Chief Complaint Patient presents with  Dizziness  Migraine Adri Ellington I am recommending the following:  
 
Diagnoses and all orders for this visit: 
 
1. Chronic migraine w/o aura w/o status migrainosus, not intractable 2. Vertigo Other orders -     topiramate (TOPAMAX) 25 mg tablet; Take 1 Tab by mouth two (2) times a day. 
 
 
 
---------------------------------------------------------------------------------------------------------------------- Below is my encounter: Chief Complaint Patient presents with  Dizziness  Migraine KIMBERLY Barrientos is a 39-year-old woman here to follow-up.  [id number] was utilized. I have been seeing her for dizziness symptoms and headache. She is also followed by cardiology who has diagnosed her with POTS. Those symptoms have improved using propranolol. She continues unfortunately to have headaches and lightheadedness. She has not seen ENT yet. She tried magnesium without any benefit. Headaches are still 2 or 3 days per week sometimes throbbing and diffuse with nausea and light sensitivity. Imaging has been benign. Review of Systems Gastrointestinal: Positive for nausea. Neurological: Positive for dizziness and headaches. All other systems reviewed and are negative. Past Medical History:  
Diagnosis Date  Hypertension  Multiple thyroid nodules  Palpitations  Thyroid nodule Family History Problem Relation Age of Onset  Kidney Disease Mother MARCUS  Hypertension Father  Diabetes Father  Kidney Disease Maternal Grandmother  Hypertension Maternal Grandmother  Cancer Maternal Grandfather   
  sto,acj  Hypertension Maternal Grandfather  No Known Problems Paternal Grandmother  No Known Problems Paternal Grandfather  No Known Problems Sister  No Known Problems Brother  Thyroid Disease Cousin x4  Thyroid Cancer Neg Hx Social History Social History  Marital status: SINGLE Spouse name: N/A  
 Number of children: N/A  
 Years of education: N/A Occupational History  Not on file. Social History Main Topics  Smoking status: Never Smoker  Smokeless tobacco: Never Used  Alcohol use No  
 Drug use: No  
 Sexual activity: Not on file Other Topics Concern  Not on file Social History Narrative ** Merged History Encounter ** No Known Allergies Current Outpatient Prescriptions Medication Sig  topiramate (TOPAMAX) 25 mg tablet Take 1 Tab by mouth two (2) times a day.  ibuprofen (MOTRIN) 200 mg tablet Take 2 Tabs by mouth every eight (8) hours as needed for Pain.  propranolol LA (INDERAL LA) 60 mg SR capsule Take 1 Cap by mouth nightly. Indications: POTS  
 biotin 1,000 mcg chew Take  by mouth. No current facility-administered medications for this visit. Neurologic Exam  
 
Mental Status WD/WN adult in NAD, normal grooming VSS 
A&O x 3 PERRL, nonicteric Face is symmetric, tongue midline Speech is fluent and clear No limb ataxia. No abnl movements. Moving all extemities spontaneously and symmetric Normal gait CVS RRR Lungs nonlabored Skin is warm and dry Visit Vitals  /80  Pulse 75  Resp 14  
 Ht 5' 3\" (1.6 m)  Wt 59.9 kg (132 lb)  SpO2 97%  BMI 23.38 kg/m2 Assessment and Plan Diagnoses and all orders for this visit: 
 
1. Chronic migraine w/o aura w/o status migrainosus, not intractable 2. Vertigo Other orders -     topiramate (TOPAMAX) 25 mg tablet; Take 1 Tab by mouth two (2) times a day. 42-year-old woman with POTS who also has chronic migraine headaches. Symptoms are more consistent with migraine. I am going to have her start topiramate twice daily low-dose only. Side effects have been discussed with her and she is expressed understanding and agreement. I would like to see her in about 3 months. Okay to continue propranolol already ongoing from cardiology. She will see ENT soon for the vertigo. Thank you for giving me the opportunity to assist in the care of Ms. Natali Sequeira. If you have questions, please do not hesitate to contact me. Sincerely, Vinicio Lanza, DO Neurologist 
Diplomate ABPN

## 2018-01-26 NOTE — PATIENT INSTRUCTIONS

## 2018-01-26 NOTE — MR AVS SNAPSHOT
[address] 
[phone] Patient: Francesca Salazar MRN: [id number] [facility] Visit Information Lashaun Maguire y Hortencia Personal Médico Departamento Teléfono del Dep. Número de visita 1/26/2018 10:20 AM Lyndy Acre Merlin Police, DO [facility] at [address] [phone] Follow-up Instructions Return in about 3 months (around 4/26/2018). Your Appointments 1/29/2018  1:50 PM  
New Patient with Angela Mcmanus MD  
22[address] [facility] ([address]) Appt Note: [address] [place] [address]  
100 James Cardona, [address] [place] [id number]  
  
    
 2/1/2018 11:40 AM  
New Patient with Cinthya Everett MD  
3240 [facility] ([address] [place] [address] Road) Appt Note: NP refd by Dr. Karon Gold for tachycardia_ [facility] (BLUE PHONE NEEDED); NP refd by Dr. Karon Gold for tachycardia_ [facility] ([address] PHONE NEEDED)  
 [address] [place] [address]  
[phone]  
  
   
 [address] [place] [phone]  
  
    
 3/22/2018 10:50 AM  
Follow Up with Mukund Inman MD  
[facility] [address] [place] [address]) Appt Note: Follow up thyroid; Follow up thyroid [phone] [address] [place] [address] 5/10/2018 11:20 AM  
ESTABLISHED PATIENT with Tarah Valente MD  
[facility] ([address]) Appt Note: 6 mnth f/u  
 [address]  
[address] [facility] [address] Upcoming Health Maintenance Date Due DTaP/Tdap/Td series (1 - Tdap) 8/8/2000 PAP AKA CERVICAL CYTOLOGY 8/8/2000 Influenza Age 5 to Adult 8/1/2017 Alergias  Review Complete El: 1/26/2018 Por: Padmini Citlali A partir del:  1/26/2018 No Known Allergies Vacunas actuales Jeananne Clear No hay ninguna vacuna archivada. No revisadas esta visita You Were Diagnosed With   
  
 Boogie Lester Chronic migraine w/o aura w/o status migrainosus, not intractable    -  Primary ICD-10-CM: S65.717 ICD-9-CM: 346.70 Vertigo     ICD-10-CM: X72 ICD-9-CM: 780.4 Partes vitales PS Pulso Resp [place] ( percentil de crecimiento) Peso (percentil de crecimiento) SpO2  
 130/80 75 14 5' 3\" (1.6 m) 132 lb (59.9 kg) 97% BMI [facility]) Estado obstétrico Estatus de tabaquísmo 23.38 kg/m2 Having regular periods Never Smoker Historial de signos vitales BMI and BSA Data Body Mass Index Body Surface Area  
 23.38 kg/m 2 1.63 m 2 [facility] Name Phone [address] [facility]  [employer] [place] [phone] Glass lista de medicamentos actualizada Lista actualizada el: 1/26/18 11:16 AM.  Sim [place] use glass lista de medicamentos más reciente. biotin 1,000 mcg Chew Take  by mouth. ibuprofen 200 mg tablet También conocido kyle:  MOTRIN Take 2 Tabs by mouth every eight (8) hours as needed for Pain.  
  
 propranolol LA 60 mg SR capsule También conocido kyle:  INDERAL LA Take 1 Cap by mouth nightly. Indications: POTS  
  
 topiramate 25 mg tablet También conocido kyle:  TOPAMAX Take 1 Tab by mouth two (2) times a day. Recetas Enviado a la [place] Refills  
 topiramate (TOPAMAX) 25 mg tablet 3 Sig: Take 1 Tab by mouth two (2) times a day. Class: Normal  
 Pharmacy: [facility], [address] [place] [address] Ph #: [phone]  Route: Oral  
  
 Instrucciones de seguimiento Return in about 3 months (around 4/26/2018). Instrucciones para el Paciente A Healthy Lifestyle: Care Instructions Your Care Instructions A healthy lifestyle can help you feel good, stay at a healthy weight, and have plenty of energy for both work and play. A healthy lifestyle is something you can share with your whole family. A healthy lifestyle also can lower your risk for serious health problems, such as high blood pressure, heart disease, and diabetes. You can follow a few steps listed below to improve your health and the health of your family. Follow-up care is a key part of your treatment and safety. Be sure to make and go to all appointments, and call your doctor if you are having problems. It's also a good idea to know your test results and keep a list of the medicines you take. How can you care for yourself at home? · Do not eat too much sugar, fat, or fast foods. You can still have dessert and treats now and then. The goal is moderation. · Start small to improve your eating habits. Pay attention to portion sizes, drink less juice and soda pop, and eat more fruits and vegetables. ¨ Eat a healthy amount of food. A 3-ounce serving of meat, for example, is about the size of a deck of cards. Fill the rest of your plate with vegetables and whole grains. ¨ Limit the amount of soda and sports drinks you have every day. Drink more water when you are thirsty. ¨ Eat at least 5 servings of fruits and vegetables every day. It may seem like a lot, but it is not hard to reach this goal. A serving or helping is 1 piece of fruit, 1 cup of vegetables, or 2 cups of leafy, raw vegetables. Have an apple or some carrot sticks as an afternoon snack instead of a candy bar. Try to have fruits and/or vegetables at every meal. 
· Make exercise part of your daily routine. You may want to start with simple activities, such as walking, bicycling, or slow swimming.  Try to be active 30 to 60 minutes every day. You do not need to do all 30 to 60 minutes all at once. For example, you can exercise 3 times a day for 10 or 20 minutes. Moderate exercise is safe for most people, but it is always a good idea to talk to your doctor before starting an exercise program. 
· Keep moving. Madison Broom the lawn, work in the garden, or [employer]. Take the stairs instead of the elevator at work. · If you smoke, quit. People who smoke have an increased risk for heart attack, stroke, cancer, and other lung illnesses. Quitting is hard, but there are ways to boost your chance of quitting tobacco for good. ¨ Use nicotine gum, patches, or lozenges. ¨ Ask your doctor about stop-smoking programs and medicines. ¨ Keep trying. In addition to reducing your risk of diseases in the future, you will notice some benefits soon after you stop using tobacco. If you have shortness of breath or asthma symptoms, they will likely get better within a few weeks after you quit. · Limit how much alcohol you drink. Moderate amounts of alcohol (up to 2 drinks a day for men, 1 drink a day for women) are okay. But drinking too much can lead to liver problems, high blood pressure, and other health problems. Family health If you have a family, there are many things you can do together to improve your health. · Eat meals together as a family as often as possible. · Eat healthy foods. This includes fruits, vegetables, lean meats and dairy, and whole grains. · Include your family in your fitness plan. Most people think of activities such as jogging or tennis as the way to fitness, but there are many ways you and your family can be more active. Anything that makes you breathe hard and gets your heart pumping is exercise. Here are some tips: 
¨ Walk to do errands or to take your child to school or the bus. ¨ Go for a family bike ride after dinner instead of watching TV. Where can you learn more? Go to http://jareth-femi.info/. Enter V950 in the search box to learn more about \"A Healthy Lifestyle: Care Instructions. \" Current as of: May 12, 2017 Content Version: 11.4 © 3886-2710 [employer]. Care instructions adapted under license by [employer] (which disclaims liability or warranty for this information). If you have questions about a medical condition or this instruction, always ask your healthcare professional. [address] any warranty or liability for your use of this information. Introducing [facility]! Rajendra Harrison introduce portal paciente MyChart . Ahora se puede acceder a partes de glass expediente médico, enviar por correo electrónico la oficina de glass médico y solicitar renovaciones de medicamentos en línea. En glass navegador de Internet , [place] a https://[email or web address] MyBeautyCompare. W-locate/mychart Shyanne clic en el usuario por Kerry Singh? Rashaad Webber clic aquí en la sesión Casi Price. Verá la página de registro [place]. Ingrese glass código de [employer] diamond y kyle aparece a continuación. Usted no tendrá que [employer] código después de jackie completado el proceso de registro . Si usted no se inscribe antes de la fecha de caducidad , debe solicitar un nuevo código. · MyChart Código de acceso : [id number] Expires: 1/29/2018  2:09 PM 
 
Ingresa los últimos cuatro dígitos de glass Número de Seguro Social ( xxxx ) y fecha de nacimiento ( dd / mm / aaaa ) kyle se indica y shyanne clic en Enviar. Usted será llevado a la siguiente página de registro . Crear un ID MyChart . Esta será glass ID de inicio de sesión de MyChart y no puede ser [place] , por lo que pensar en buck que es Denzel Fossa y fácil de recordar . Crear buck contraseña MyChart . Usted puede cambiar glass contraseña en cualquier momento . Ingrese glass Password Reset de preguntas y [place] . [place] se puede utilizar en un momento posterior si usted olvida glass contraseña. Introduzca glass dirección de correo electrónico . [place]ang[place]a [place] recibirá buck notificación por correo electrónico cuando la nueva información está disponible en MyChart . Claudetta Belfast clic en Registrarse. Alonso Luana marija y descargar porciones de glass expediente médico. 
Alisson fiordaliza en el enlace de descarga del menú Resumen para descargar buck copia portátil de glass información médica . Si tiene [employer] , por favor visite la sección de preguntas frecuentes del sitio web MyCEd4Ut . Recuerde, WhoCanHelp.comt NO es que se utilizará para las necesidades urgentes. Para emergencias médicas , llame al 911 . Ahora disponible en glass iPhone y Android ! Por favor proporcione omari resumen de la documentación de cuidado a glass próximo proveedor. Your primary care clinician is listed as Lizet Loco. If you have any questions after today's visit, please call [phone].

## 2018-01-26 NOTE — COMMUNICATION BODY
Chief Complaint   Patient presents with    Dizziness    Migraine       HPI    Rachelle Moore is a 50-year-old woman here to follow-up.  [id number] was utilized. I have been seeing her for dizziness symptoms and headache. She is also followed by cardiology who has diagnosed her with POTS. Those symptoms have improved using propranolol. She continues unfortunately to have headaches and lightheadedness. She has not seen ENT yet. She tried magnesium without any benefit. Headaches are still 2 or 3 days per week sometimes throbbing and diffuse with nausea and light sensitivity. Imaging has been benign. Review of Systems   Gastrointestinal: Positive for nausea. Neurological: Positive for dizziness and headaches. All other systems reviewed and are negative. Past Medical History:   Diagnosis Date    Hypertension     Multiple thyroid nodules     Palpitations     Thyroid nodule      Family History   Problem Relation Age of Onset    Kidney Disease Mother      MARCUS    Hypertension Father     Diabetes Father     Kidney Disease Maternal Grandmother     Hypertension Maternal Grandmother     Cancer Maternal Grandfather      sto,acj    Hypertension Maternal Grandfather     No Known Problems Paternal Grandmother     No Known Problems Paternal Grandfather     No Known Problems Sister     No Known Problems Brother     Thyroid Disease Cousin      x4    Thyroid Cancer Neg Hx      Social History     Social History    Marital status: SINGLE     Spouse name: N/A    Number of children: N/A    Years of education: N/A     Occupational History    Not on file.      Social History Main Topics    Smoking status: Never Smoker    Smokeless tobacco: Never Used    Alcohol use No    Drug use: No    Sexual activity: Not on file     Other Topics Concern    Not on file     Social History Narrative    ** Merged History Encounter **          No Known Allergies      Current Outpatient Prescriptions Medication Sig    topiramate (TOPAMAX) 25 mg tablet Take 1 Tab by mouth two (2) times a day.  ibuprofen (MOTRIN) 200 mg tablet Take 2 Tabs by mouth every eight (8) hours as needed for Pain.  propranolol LA (INDERAL LA) 60 mg SR capsule Take 1 Cap by mouth nightly. Indications: POTS    biotin 1,000 mcg chew Take  by mouth. No current facility-administered medications for this visit. Neurologic Exam     Mental Status        WD/WN adult in NAD, normal grooming  VSS  A&O x 3    PERRL, nonicteric  Face is symmetric, tongue midline  Speech is fluent and clear  No limb ataxia. No abnl movements. Moving all extemities spontaneously and symmetric  Normal gait    CVS RRR  Lungs nonlabored  Skin is warm and dry         Visit Vitals    /80    Pulse 75    Resp 14    Ht 5' 3\" (1.6 m)    Wt 59.9 kg (132 lb)    SpO2 97%    BMI 23.38 kg/m2       Assessment and Plan   Diagnoses and all orders for this visit:    1. Chronic migraine w/o aura w/o status migrainosus, not intractable    2. Vertigo    Other orders  -     topiramate (TOPAMAX) 25 mg tablet; Take 1 Tab by mouth two (2) times a day. 79-year-old woman with POTS who also has chronic migraine headaches. Symptoms are more consistent with migraine. I am going to have her start topiramate twice daily low-dose only. Side effects have been discussed with her and she is expressed understanding and agreement. I would like to see her in about 3 months. Okay to continue propranolol already ongoing from cardiology. She will see ENT soon for the vertigo.       812 [facility], [address]  Diplomate AGGIE

## 2018-01-26 NOTE — PROGRESS NOTES
Chief Complaint   Patient presents with    Dizziness    Migraine       HPI    Vanessa Oconnor is a 76-year-old woman here to follow-up.  [id number] was utilized. I have been seeing her for dizziness symptoms and headache. She is also followed by cardiology who has diagnosed her with POTS. Those symptoms have improved using propranolol. She continues unfortunately to have headaches and lightheadedness. She has not seen ENT yet. She tried magnesium without any benefit. Headaches are still 2 or 3 days per week sometimes throbbing and diffuse with nausea and light sensitivity. Imaging has been benign. Review of Systems   Gastrointestinal: Positive for nausea. Neurological: Positive for dizziness and headaches. All other systems reviewed and are negative. Past Medical History:   Diagnosis Date    Hypertension     Multiple thyroid nodules     Palpitations     Thyroid nodule      Family History   Problem Relation Age of Onset    Kidney Disease Mother      MARCUS    Hypertension Father     Diabetes Father     Kidney Disease Maternal Grandmother     Hypertension Maternal Grandmother     Cancer Maternal Grandfather      sto,acj    Hypertension Maternal Grandfather     No Known Problems Paternal Grandmother     No Known Problems Paternal Grandfather     No Known Problems Sister     No Known Problems Brother     Thyroid Disease Cousin      x4    Thyroid Cancer Neg Hx      Social History     Social History    Marital status: SINGLE     Spouse name: N/A    Number of children: N/A    Years of education: N/A     Occupational History    Not on file.      Social History Main Topics    Smoking status: Never Smoker    Smokeless tobacco: Never Used    Alcohol use No    Drug use: No    Sexual activity: Not on file     Other Topics Concern    Not on file     Social History Narrative    ** Merged History Encounter **          No Known Allergies      Current Outpatient Prescriptions Medication Sig    topiramate (TOPAMAX) 25 mg tablet Take 1 Tab by mouth two (2) times a day.  ibuprofen (MOTRIN) 200 mg tablet Take 2 Tabs by mouth every eight (8) hours as needed for Pain.  propranolol LA (INDERAL LA) 60 mg SR capsule Take 1 Cap by mouth nightly. Indications: POTS    biotin 1,000 mcg chew Take  by mouth. No current facility-administered medications for this visit. Neurologic Exam     Mental Status        WD/WN adult in NAD, normal grooming  VSS  A&O x 3    PERRL, nonicteric  Face is symmetric, tongue midline  Speech is fluent and clear  No limb ataxia. No abnl movements. Moving all extemities spontaneously and symmetric  Normal gait    CVS RRR  Lungs nonlabored  Skin is warm and dry         Visit Vitals    /80    Pulse 75    Resp 14    Ht 5' 3\" (1.6 m)    Wt 59.9 kg (132 lb)    SpO2 97%    BMI 23.38 kg/m2       Assessment and Plan   Diagnoses and all orders for this visit:    1. Chronic migraine w/o aura w/o status migrainosus, not intractable    2. Vertigo    Other orders  -     topiramate (TOPAMAX) 25 mg tablet; Take 1 Tab by mouth two (2) times a day. 42-year-old woman with POTS who also has chronic migraine headaches. Symptoms are more consistent with migraine. I am going to have her start topiramate twice daily low-dose only. Side effects have been discussed with her and she is expressed understanding and agreement. I would like to see her in about 3 months. Okay to continue propranolol already ongoing from cardiology. She will see ENT soon for the vertigo.       812 [facility], [address]  Diplomate AGGIE

## 2018-02-01 ENCOUNTER — OFFICE VISIT (OUTPATIENT)
Dept: SLEEP MEDICINE | Age: 38
End: 2018-02-01

## 2018-02-01 VITALS
HEART RATE: 86 BPM | BODY MASS INDEX: 23.67 KG/M2 | SYSTOLIC BLOOD PRESSURE: 119 MMHG | DIASTOLIC BLOOD PRESSURE: 80 MMHG | WEIGHT: 133.6 LBS | HEIGHT: 63 IN | OXYGEN SATURATION: 98 %

## 2018-02-01 DIAGNOSIS — G47.33 OSA (OBSTRUCTIVE SLEEP APNEA): Primary | ICD-10-CM

## 2018-02-01 DIAGNOSIS — R00.2 HEART PALPITATIONS: ICD-10-CM

## 2018-02-01 DIAGNOSIS — I10 ESSENTIAL HYPERTENSION: ICD-10-CM

## 2018-02-01 NOTE — PATIENT INSTRUCTIONS
[address] [place]. [place], [address]  Tel.  [phone]  Fax. [address] [facility] 60  [place], [address] [facility]  Tel.  [phone]  Fax. [phone] [address] [facility] [place] [address] [place]  Tel.  [phone]  Fax. [phone]     Sleep Apnea: After Your Visit  Your Care Instructions  Sleep apnea occurs when you frequently stop breathing for 10 seconds or longer during sleep. It can be mild to severe, based on the number of times per hour that you stop breathing or have slowed breathing. Blocked or narrowed airways in your nose, mouth, or throat can cause sleep apnea. Your airway can become blocked when your throat muscles and tongue relax during sleep. Sleep apnea is common, occurring in 1 out of 20 individuals. Individuals having any of the following characteristics should be evaluated and treated right away due to high risk and detrimental consequences from untreated sleep apnea:  1. Obesity  2. Congestive Heart failure  3. Atrial Fibrillation  4. Uncontrolled Hypertension  5. Type II Diabetes  6. Night-time Arrhythmias  7. Stroke  8. Pulmonary Hypertension  9. High-risk Driving Populations (pilots, truck drivers, etc.)  10. Patients Considering Weight-loss Surgery    How do you know you have sleep apnea? You probably have sleep apnea if you answer 'yes' to 3 or more of the following questions:  S - Have you been told that you Snore? T - Are you often Tired during the day? O - Has anyone Observed you stop breathing while sleeping? P- Do you have (or are being treated for) high blood Pressure? B - Are you obese (Body Mass Index > 35)? A - Is your Age 48years old or older? N - Is your Neck size greater than 16 inches? G - Are you male Gender? A sleep physician can prescribe a breathing device that prevents tissues in the throat from blocking your airway.  Or your doctor may recommend using a dental device (oral breathing device) to help keep your airway open. In some cases, surgery may be needed to remove enlarged tissues in the throat. Follow-up care is a key part of your treatment and safety. Be sure to make and go to all appointments, and call your doctor if you are having problems. It's also a good idea to know your test results and keep a list of the medicines you take. How can you care for yourself at home? · Lose weight, if needed. It may reduce the number of times you stop breathing or have slowed breathing. · Go to bed at the same time every night. · Sleep on your side. It may stop mild apnea. If you tend to roll onto your back, sew a pocket in the back of your pajama top. Put a tennis ball into the pocket, and stitch the pocket shut. This will help keep you from sleeping on your back. · Avoid alcohol and medicines such as sleeping pills and sedatives before bed. · Do not smoke. Smoking can make sleep apnea worse. If you need help quitting, talk to your doctor about stop-smoking programs and medicines. These can increase your chances of quitting for good. · Prop up the head of your bed 4 to 6 inches by putting bricks under the legs of the bed. · Treat breathing problems, such as a stuffy nose, caused by a cold or allergies. · Use a continuous positive airway pressure (CPAP) breathing machine if lifestyle changes do not help your apnea and your doctor recommends it. The machine keeps your airway from closing when you sleep. · If CPAP does not help you, ask your doctor whether you should try other breathing machines. A bilevel positive airway pressure machine has two types of air pressureâone for breathing in and one for breathing out. Another device raises or lowers air pressure as needed while you breathe. · If your nose feels dry or bleeds when using one of these machines, talk with your doctor about increasing moisture in the air. A humidifier may help.   · If your nose is runny or stuffy from using a breathing machine, talk with your doctor about using decongestants or a corticosteroid nasal spray. When should you call for help? Watch closely for changes in your health, and be sure to contact your doctor if:  · You still have sleep apnea even though you have made lifestyle changes. · You are thinking of trying a device such as CPAP. · You are having problems using a CPAP or similar machine. Where can you learn more? Go to [employer]. Enter I884 in the search box to learn more about \"Sleep Apnea: After Your Visit. \"   © 7613-2955 Healthwise, [employer]. Care instructions adapted under license by Sylwia Funk (which disclaims liability or warranty for this information). This care instruction is for use with your licensed healthcare professional. If you have questions about a medical condition or this instruction, always ask your healthcare professional. Ofelia Wise any warranty or liability for your use of this information. PROPER SLEEP HYGIENE    What to avoid  · Do not have drinks with caffeine, such as coffee or black tea, for 8 hours before bed. · Do not smoke or use other types of tobacco near bedtime. Nicotine is a stimulant and can keep you awake. · Avoid drinking alcohol late in the evening, because it can cause you to wake in the middle of the night. · Do not eat a big meal close to bedtime. If you are hungry, eat a light snack. · Do not drink a lot of water close to bedtime, because the need to urinate may wake you up during the night. · Do not read or watch TV in bed. Use the bed only for sleeping and sexual activity. What to try  · Go to bed at the same time every night, and wake up at the same time every morning. Do not take naps during the day. · Keep your bedroom quiet, dark, and cool. · Get regular exercise, but not within 3 to 4 hours of your bedtime. .  · Sleep on a comfortable pillow and mattress.   · If watching the clock makes you anxious, turn it facing away from you so you cannot see the time. · If you worry when you lie down, start a worry book. Well before bedtime, write down your worries, and then set the book and your concerns aside. · Try meditation or other relaxation techniques before you go to bed. · If you cannot fall asleep, get up and go to another room until you feel sleepy. Do something relaxing. Repeat your bedtime routine before you go to bed again. · Make your house quiet and calm about an hour before bedtime. Turn down the lights, turn off the TV, log off the computer, and turn down the volume on music. This can help you relax after a busy day. Drowsy Driving  The [address] [place] [address] Road Traffic Safety Administration cites drowsiness as a causing factor in more than 421,126 police reported crashes annually, resulting in 76,000 injuries and 1,500 deaths. Other surveys suggest 55% of people polled have driven while drowsy in the past year, 23% had fallen asleep but not crashed, 3% crashed, and 2% had and accident due to drowsy driving. Who is at risk? Young Drivers: One study of drowsy driving accidents states that 55% of the drivers were under 25 years. Of those, 75% were male. Shift Workers and Travelers: People who work overnight or travel across time zones frequently are at higher risk of experiencing Circadian Rhythm Disorders. They are trying to work and function when their body is programed to sleep. Sleep Deprived: Lack of sleep has a serious impact on your ability to pay attention or focus on a task. Consistently getting less than the average of 8 hours your body needs creates partial or cumulative sleep deprivation. Untreated Sleep Disorders: Sleep Apnea, Narcolepsy, R.L.S., and other sleep disorders (untreated) prevent a person from getting enough restful sleep. This leads to excessive daytime sleepiness and increases the risk for drowsy driving accidents by up to 7 times.   Medications / Alcohol: Even over the counter medications can cause drowsiness. Medications that impair a drivers attention should have a warning label. Alcohol naturally makes you sleepy and on its own can cause accidents. Combined with excessive drowsiness its effects are amplified. Signs of Drowsy Driving:   * You don't remember driving the last few miles   * You may drift out of your stone   * You are unable to focus and your thoughts wander   * You may yawn more often than normal   * You have difficulty keeping your eyes open / nodding off   * Missing traffic signs, speeding, or tailgating  Prevention-   Good sleep hygiene, lifestyle and behavioral choices have the most impact on drowsy driving. There is no substitute for sleep and the average person requires 8 hours nightly. If you find yourself driving drowsy, stop and sleep. Consider the sleep hygiene tips provided during your visit as well. Medication Refill Policy: Refills for all medications require 1 week advance notice. Please have your pharmacy fax a refill request. We are unable to fax, or call in \"controled substance\" medications and you will need to pick these prescriptions up from our office. [facility] Activation    Thank you for requesting access to [facility]. Please follow the instructions below to securely access and download your online medical record. [facility] allows you to send messages to your doctor, view your test results, renew your prescriptions, schedule appointments, and more. How Do I Sign Up? 1. In your internet browser, go to [email or web address]. 2. Click on the First Time User? Click Here link in the Sign In box. You will see the New Member Sign Up page. 3. Enter your [facility] Access Code exactly as it appears below. You will not need to use this code after youve completed the sign-up process. If you do not sign up before the expiration date, you must request a new code.     [facility] Access Code: [email or web address]  Expires: 5/2/2018 12:29 PM (This is the date your [facility] access code will )    4. Enter the last four digits of your Social Security Number (xxxx) and Date of Birth (mm/dd/yyyy) as indicated and click Submit. You will be taken to the next sign-up page. 5. Create a [employer] ID. This will be your [facility]iation login ID and cannot be changed, so think of one that is secure and easy to remember. 6. Create a [facility] password. You can change your password at any time. 7. Enter your Password Reset Question and Answer. This can be used at a later time if you forget your password. 8. Enter your e-mail address. You will receive e-mail notification when new information is available in [address] 9. Click Sign Up. You can now view and download portions of your medical record. 10. Click the Download Summary menu link to download a portable copy of your medical information. Additional Information    If you have questions, please call [phone]. Remember, [facility] is NOT to be used for urgent needs. For medical emergencies, dial 911.

## 2018-02-02 ENCOUNTER — OFFICE VISIT (OUTPATIENT)
Dept: OBGYN CLINIC | Age: 38
End: 2018-02-02

## 2018-02-02 VITALS
HEIGHT: 63 IN | RESPIRATION RATE: 16 BRPM | WEIGHT: 132.8 LBS | DIASTOLIC BLOOD PRESSURE: 68 MMHG | BODY MASS INDEX: 23.53 KG/M2 | HEART RATE: 64 BPM | OXYGEN SATURATION: 99 % | SYSTOLIC BLOOD PRESSURE: 132 MMHG

## 2018-02-02 DIAGNOSIS — Z00.00 WELL WOMAN EXAM (NO GYNECOLOGICAL EXAM): Primary | ICD-10-CM

## 2018-02-02 DIAGNOSIS — N89.8 VAGINAL IRRITATION: ICD-10-CM

## 2018-02-02 DIAGNOSIS — Z11.3 SCREENING FOR STD (SEXUALLY TRANSMITTED DISEASE): ICD-10-CM

## 2018-02-02 DIAGNOSIS — Z11.51 SCREENING FOR HPV (HUMAN PAPILLOMAVIRUS): ICD-10-CM

## 2018-02-02 NOTE — PROGRESS NOTES
Annual exam    Jen Irizarry is a 44 y.o. presenting for annual exam. Her main concerns today include vaginal irritation    Ob/Gyn Hx:   A 1  LMP- 17  Menarche- age 9  Menses- regular monthly cycles? yes, last 5 days, passage of clots? no, intermenstrual spotting? no,   Contraception-NOT SA  STI- remote CT      Health maintenance:  Pap- neeeds  Mammo-never    Past Medical History:   Diagnosis Date    Hypertension     Multiple thyroid nodules     Palpitations     Thyroid nodule        Past Surgical History:   Procedure Laterality Date    TILT TABLE EVALUATION  10/27/2017            Family History   Problem Relation Age of Onset    Kidney Disease Mother      MARCUS    Hypertension Father     Diabetes Father     Kidney Disease Maternal Grandmother     Hypertension Maternal Grandmother     Cancer Maternal Grandfather      sto,acj    Hypertension Maternal Grandfather     No Known Problems Paternal Grandmother     No Known Problems Paternal Grandfather     No Known Problems Sister     No Known Problems Brother     Thyroid Disease Cousin      x4    Thyroid Cancer Neg Hx        Social History     Social History    Marital status: SINGLE     Spouse name: N/A    Number of children: N/A    Years of education: N/A     Occupational History    Not on file. Social History Main Topics    Smoking status: Never Smoker    Smokeless tobacco: Never Used    Alcohol use No    Drug use: No    Sexual activity: No     Other Topics Concern    Not on file     Social History Narrative    ** Merged History Encounter **            Current Outpatient Prescriptions   Medication Sig Dispense Refill    topiramate (TOPAMAX) 25 mg tablet Take 1 Tab by mouth two (2) times a day. 60 Tab 3    propranolol LA (INDERAL LA) 60 mg SR capsule Take 1 Cap by mouth nightly. Indications: POTS 30 Cap 5    ibuprofen (MOTRIN) 200 mg tablet Take 2 Tabs by mouth every eight (8) hours as needed for Pain.  30 Tab 0    biotin 1,000 mcg chew Take  by mouth.          No Known Allergies    Review of Systems - History obtained from the patient  Constitutional: negative for weight loss, fever, night sweats  HEENT: negative for hearing loss, earache, congestion, snoring, sorethroat  CV: negative for chest pain, palpitations, edema  Resp: negative for cough, shortness of breath, wheezing  GI: negative for change in bowel habits, abdominal pain, black or bloody stools  : negative for frequency, dysuria, hematuria, vaginal discharge  MSK: negative for back pain, joint pain, muscle pain  Breast: negative for breast lumps, nipple discharge, galactorrhea  Skin :negative for itching, rash, hives  Neuro: negative for dizziness, headache, confusion, weakness  Psych: negative for anxiety, depression, change in mood  Heme/lymph: negative for bleeding, bruising, pallor    Physical Exam    Visit Vitals    /68 (BP 1 Location: Left arm, BP Patient Position: Sitting)    Pulse 64    Resp 16    Ht 5' 3\" (1.6 m)    Wt 132 lb 12.8 oz (60.2 kg)    LMP 01/14/2018    SpO2 99%    BMI 23.52 kg/m2       Constitutional  · Appearance: well-nourished, well developed, alert, in no acute distress    HENT  · Head and Face: appears normal    Neck  · Inspection/Palpation: normal appearance, no masses or tenderness  · Lymph Nodes: no lymphadenopathy present  · Thyroid: gland size normal, nontender, no nodules or masses present on palpation    Chest  · Respiratory Effort: non-labored breathing  · Auscultation: CTAB, normal breath sounds    Cardiovascular  · Heart:  · Auscultation: regular rate and rhythm without murmur  · Extremities: no peripheral edema    Breasts  · declined    Gastrointestinal  · Abdominal Examination: abdomen non-tender to palpation, normal bowel sounds, no masses present  · Liver and spleen: no hepatomegaly present, spleen not palpable  · Hernias: no hernias identified    Genitourinary  · External Genitalia: normal appearance for age, no discharge present, no tenderness present, no inflammatory lesions present, no masses present, no atrophy present  · Vagina: normal vaginal vault without central or paravaginal defects, no discharge present, no inflammatory lesions present, no masses present  · Bladder: non-tender to palpation  · Urethra: appears normal  · Cervix: normal   · Uterus: normal size, shape and consistency  · Adnexa: no adnexal tenderness present, no adnexal masses present  · Perineum: perineum within normal limits, no evidence of trauma, no rashes or skin lesions present    Skin  · General Inspection: no rash, no lesions identified    Neurologic/Psychiatric  · Mental Status:  · Orientation: grossly oriented to person, place and time  · Mood and Affect: mood normal, affect appropriate      Assessment/Plan:  44 y.o.  presenting for annual exam. Overall doing well.      Health Maintenance:  -counseled re: diet, exercise, healthy lifestyle  -pap/HPV sent  Vaginitis plus sent, advised trial of Monistat in the meantime    RTC: 1 year for annual wellness assessment, or sooner prn for problems or concerns  -handouts and instructions provided    Layla Stoddard MD  2/2/2018  2:04 PM

## 2018-02-05 LAB
A VAGINAE DNA VAG QL NAA+PROBE: ABNORMAL SCORE
BVAB2 DNA VAG QL NAA+PROBE: ABNORMAL SCORE
C ALBICANS DNA VAG QL NAA+PROBE: POSITIVE
C GLABRATA DNA VAG QL NAA+PROBE: NEGATIVE
MEGA1 DNA VAG QL NAA+PROBE: ABNORMAL SCORE

## 2018-02-05 RX ORDER — METRONIDAZOLE 500 MG/1
500 TABLET ORAL 2 TIMES DAILY
Qty: 14 TAB | Refills: 0 | Status: SHIPPED | OUTPATIENT
Start: 2018-02-05 | End: 2018-02-12

## 2018-02-05 RX ORDER — FLUCONAZOLE 150 MG/1
150 TABLET ORAL DAILY
Qty: 1 TAB | Refills: 0 | Status: SHIPPED | OUTPATIENT
Start: 2018-02-05 | End: 2018-02-06

## 2018-02-06 LAB
C TRACH RRNA CVX QL NAA+PROBE: POSITIVE
CYTOLOGIST CVX/VAG CYTO: ABNORMAL
CYTOLOGY CVX/VAG DOC THIN PREP: ABNORMAL
DX ICD CODE: ABNORMAL
DX ICD CODE: ABNORMAL
HPV I/H RISK 1 DNA CVX QL PROBE+SIG AMP: NEGATIVE
Lab: ABNORMAL
N GONORRHOEA RRNA CVX QL NAA+PROBE: NEGATIVE
OTHER STN SPEC: ABNORMAL
PATH REPORT.FINAL DX SPEC: ABNORMAL
STAT OF ADQ CVX/VAG CYTO-IMP: ABNORMAL
T VAGINALIS RRNA SPEC QL NAA+PROBE: POSITIVE

## 2018-02-07 NOTE — PROGRESS NOTES
Speaks Citizen of [place]. Notify NILM pap. Flagyl rx (aleady sent for BV) should cover trich. Send rx for Azithromax 1g PO once.

## 2018-02-12 ENCOUNTER — HOSPITAL ENCOUNTER (EMERGENCY)
Age: 38
Discharge: HOME | End: 2018-02-12
Attending: EMERGENCY MEDICINE
Payer: MEDICAID

## 2018-02-12 VITALS
DIASTOLIC BLOOD PRESSURE: 71 MMHG | SYSTOLIC BLOOD PRESSURE: 125 MMHG | OXYGEN SATURATION: 100 % | BODY MASS INDEX: 23.63 KG/M2 | HEART RATE: 76 BPM | RESPIRATION RATE: 16 BRPM | WEIGHT: 133.38 LBS | TEMPERATURE: 98.2 F | HEIGHT: 63 IN

## 2018-02-12 DIAGNOSIS — R42 DIZZINESS: Primary | ICD-10-CM

## 2018-02-12 DIAGNOSIS — E87.6 HYPOKALEMIA: ICD-10-CM

## 2018-02-12 LAB
ALBUMIN SERPL-MCNC: 3.9 G/DL (ref 3.5–5)
ALBUMIN/GLOB SERPL: 0.9 {RATIO} (ref 1.1–2.2)
ALP SERPL-CCNC: 54 U/L (ref 45–117)
ALT SERPL-CCNC: 14 U/L (ref 12–78)
ANION GAP SERPL CALC-SCNC: 9 MMOL/L (ref 5–15)
AST SERPL-CCNC: 14 U/L (ref 15–37)
ATRIAL RATE: 81 BPM
BASOPHILS # BLD: 0 K/UL (ref 0–0.1)
BASOPHILS NFR BLD: 0 % (ref 0–1)
BILIRUB SERPL-MCNC: 0.3 MG/DL (ref 0.2–1)
BUN SERPL-MCNC: 12 MG/DL (ref 6–20)
BUN/CREAT SERPL: 18 (ref 12–20)
CALCIUM SERPL-MCNC: 8.9 MG/DL (ref 8.5–10.1)
CALCULATED P AXIS, ECG09: 51 DEGREES
CALCULATED R AXIS, ECG10: 36 DEGREES
CALCULATED T AXIS, ECG11: 20 DEGREES
CHLORIDE SERPL-SCNC: 106 MMOL/L (ref 97–108)
CO2 SERPL-SCNC: 26 MMOL/L (ref 21–32)
CREAT SERPL-MCNC: 0.66 MG/DL (ref 0.55–1.02)
DIAGNOSIS, 93000: NORMAL
DIFFERENTIAL METHOD BLD: ABNORMAL
EOSINOPHIL # BLD: 0.1 K/UL (ref 0–0.4)
EOSINOPHIL NFR BLD: 1 % (ref 0–7)
ERYTHROCYTE [DISTWIDTH] IN BLOOD BY AUTOMATED COUNT: 13 % (ref 11.5–14.5)
GLOBULIN SER CALC-MCNC: 4.3 G/DL (ref 2–4)
GLUCOSE SERPL-MCNC: 98 MG/DL (ref 65–100)
HCT VFR BLD AUTO: 40.2 % (ref 35–47)
HGB BLD-MCNC: 14.2 G/DL (ref 11.5–16)
IMM GRANULOCYTES # BLD: 0 K/UL (ref 0–0.04)
IMM GRANULOCYTES NFR BLD AUTO: 0 % (ref 0–0.5)
LIPASE SERPL-CCNC: 143 U/L (ref 73–393)
LYMPHOCYTES # BLD: 2.5 K/UL (ref 0.8–3.5)
LYMPHOCYTES NFR BLD: 34 % (ref 12–49)
MCH RBC QN AUTO: 30 PG (ref 26–34)
MCHC RBC AUTO-ENTMCNC: 35.3 G/DL (ref 30–36.5)
MCV RBC AUTO: 85 FL (ref 80–99)
MONOCYTES # BLD: 0.5 K/UL (ref 0–1)
MONOCYTES NFR BLD: 6 % (ref 5–13)
NEUTS SEG # BLD: 4.3 K/UL (ref 1.8–8)
NEUTS SEG NFR BLD: 58 % (ref 32–75)
NRBC # BLD: 0 K/UL (ref 0–0.01)
NRBC BLD-RTO: 0 PER 100 WBC
P-R INTERVAL, ECG05: 144 MS
PLATELET # BLD AUTO: 415 K/UL (ref 150–400)
PMV BLD AUTO: 9.3 FL (ref 8.9–12.9)
POTASSIUM SERPL-SCNC: 3.3 MMOL/L (ref 3.5–5.1)
PROT SERPL-MCNC: 8.2 G/DL (ref 6.4–8.2)
Q-T INTERVAL, ECG07: 356 MS
QRS DURATION, ECG06: 78 MS
QTC CALCULATION (BEZET), ECG08: 413 MS
RBC # BLD AUTO: 4.73 M/UL (ref 3.8–5.2)
SODIUM SERPL-SCNC: 141 MMOL/L (ref 136–145)
TROPONIN I SERPL-MCNC: <0.04 NG/ML
VENTRICULAR RATE, ECG03: 81 BPM
WBC # BLD AUTO: 7.4 K/UL (ref 3.6–11)

## 2018-02-12 PROCEDURE — 84484 ASSAY OF TROPONIN QUANT: CPT | Performed by: NURSE PRACTITIONER

## 2018-02-12 PROCEDURE — 99285 EMERGENCY DEPT VISIT HI MDM: CPT

## 2018-02-12 PROCEDURE — 74011250636 HC RX REV CODE- 250/636: Performed by: EMERGENCY MEDICINE

## 2018-02-12 PROCEDURE — 83690 ASSAY OF LIPASE: CPT | Performed by: NURSE PRACTITIONER

## 2018-02-12 PROCEDURE — 36415 COLL VENOUS BLD VENIPUNCTURE: CPT | Performed by: NURSE PRACTITIONER

## 2018-02-12 PROCEDURE — 85025 COMPLETE CBC W/AUTO DIFF WBC: CPT | Performed by: NURSE PRACTITIONER

## 2018-02-12 PROCEDURE — 80053 COMPREHEN METABOLIC PANEL: CPT | Performed by: NURSE PRACTITIONER

## 2018-02-12 PROCEDURE — 74011250637 HC RX REV CODE- 250/637: Performed by: EMERGENCY MEDICINE

## 2018-02-12 PROCEDURE — 96360 HYDRATION IV INFUSION INIT: CPT

## 2018-02-12 PROCEDURE — 93005 ELECTROCARDIOGRAM TRACING: CPT

## 2018-02-12 RX ORDER — POTASSIUM CHLORIDE 750 MG/1
40 TABLET, FILM COATED, EXTENDED RELEASE ORAL
Status: COMPLETED | OUTPATIENT
Start: 2018-02-12 | End: 2018-02-12

## 2018-02-12 RX ORDER — MECLIZINE HCL 12.5 MG 12.5 MG/1
12.5 TABLET ORAL
Status: COMPLETED | OUTPATIENT
Start: 2018-02-12 | End: 2018-02-12

## 2018-02-12 RX ORDER — MECLIZINE HCL 12.5 MG 12.5 MG/1
12.5 TABLET ORAL
Qty: 15 TAB | Refills: 0 | Status: SHIPPED | OUTPATIENT
Start: 2018-02-12 | End: 2018-04-19

## 2018-02-12 RX ADMIN — POTASSIUM CHLORIDE 40 MEQ: 750 TABLET, EXTENDED RELEASE ORAL at 15:10

## 2018-02-12 RX ADMIN — SODIUM CHLORIDE 1000 ML: 900 INJECTION, SOLUTION INTRAVENOUS at 15:09

## 2018-02-12 RX ADMIN — MECLIZINE 12.5 MG: 12.5 TABLET ORAL at 15:10

## 2018-02-12 NOTE — ED NOTES
The patient is a 44 y.o. female with past medical history significant for POTS who presents from home with chief complaint of dizziness. Patient states that is having POTS symptoms that have worsened since last night. Feels faint but no syncopal episodes. Just before starting her menstrual cycle and eating makes it worse. Has seen cardiologist and has tried propranolol 60mg but she has not used it in 1 week. The medication gives her a lot of anxiety while taking it. Symptoms today include: dizziness, nausea, intermittent chest pain, balance issues, difficulty concentrating, palpitations. Used Metoprolol for 2 years for thyroid symptoms, but cardiologist thinks this was making her POTS worse. Has had a tilt table. Antivert has helped symptoms in past. Last seen at [facility] in December in the ED for symptoms. Patient denies: fevers, chills, vomiting, diarrhea, constipation, loss of bowel or bladder,shortness of breath or dyspnea on exertion. Nothing makes the symptoms better or worse. There are no other acute medical concerns at this time.  number: [id number]  PCP: Emmanuel Mariee MD     Assessment & Plan:   EKG/Trop-I  UA with micro.  Hold sample for urine culture  CMP  CBC with DIFF    Radha Groves NP  02/12/18  1:26 PM

## 2018-02-12 NOTE — ED PROVIDER NOTES
HPI Comments: 44 y.o. female with past medical history significant for costochondritis, HTN and POTS who presents ambulatory from home with chief complaint of dizziness. Pt reports several months of ongoing issues related to POTS that worsened last night. Pt specifically c/o room-spinning dizziness, feeling \"faint\", headache, and mild SOB. Pt states she has been evaluated for the same in the past at [address] [place] [address] where she was diagnosed with vertigo and treated with IV fluids and 12.5 mg Meclizine with relief. Pt was instructed to f/u with ENT, but has not yet made an appointment. Pt regularly takes 5 mg Valium for the same. Pt states POTS symptoms are exacerbated with menstrual periods, certain foods, and stress. Pt is also prescribed Topamax for her migraine headaches. Pt's LMP was 1/17/18. Pt specifically denies medication allergies, chest pain, fever, cough, vomiting, diarrhea and abd pain. There are no other acute medical concerns at this time.     Social hx: denies tobacco use; denies EtOH use; denies illicit drug use  PCP: Mahad Houston MD    Note written by Courtney Hi, as dictated by Narciso Peoples MD 2:38 PM      The history is provided by the patient. The history is limited by a language barrier. A  was used ([facility] [place]         Past Medical History:   Diagnosis Date    Hypertension     Multiple thyroid nodules     Palpitations     POTS (postural orthostatic tachycardia syndrome)     Thyroid nodule        Past Surgical History:   Procedure Laterality Date    TILT TABLE EVALUATION  10/27/2017              Family History:   Problem Relation Age of Onset    Kidney Disease Mother      MARCUS    Hypertension Father     Diabetes Father     Kidney Disease Maternal Grandmother     Hypertension Maternal Grandmother     Cancer Maternal Grandfather      sto,acj    Hypertension Maternal Grandfather     No Known Problems Paternal Grandmother     No Known Problems Paternal Grandfather  No Known Problems Sister     No Known Problems Brother     Thyroid Disease Cousin      x4    Thyroid Cancer Neg Hx        Social History     Social History    Marital status: SINGLE     Spouse name: N/A    Number of children: N/A    Years of education: N/A     Occupational History    Not on file. Social History Main Topics    Smoking status: Never Smoker    Smokeless tobacco: Never Used    Alcohol use No    Drug use: No    Sexual activity: No     Other Topics Concern    Not on file     Social History Narrative    ** Merged History Encounter **              ALLERGIES: Review of patient's allergies indicates no known allergies. Review of Systems   Constitutional: Negative for fever. Respiratory: Positive for shortness of breath. Negative for cough. Cardiovascular: Negative for chest pain. Gastrointestinal: Negative for abdominal pain, diarrhea and vomiting. Neurological: Positive for dizziness and headaches. All other systems reviewed and are negative.       Vitals:    02/12/18 1244 02/12/18 1437   BP: 140/84    Pulse: (!) 105    Resp: 16    Temp: 98.2 °F (36.8 °C)    SpO2: 100% 100%   Weight: 60.5 kg (133 lb 6 oz)    Height: 5' 3\" (1.6 m)             Physical Exam   Physical Examination: General appearance - alert, well appearing, and in no distress, oriented to person, place, and time and normal appearing weight  Eyes - pupils equal and reactive, extraocular eye movements intact  Neck - supple, no significant adenopathy  Chest - clear to auscultation, no wheezes, rales or rhonchi, symmetric air entry  Heart - normal rate, regular rhythm, normal S1, S2, no murmurs, rubs, clicks or gallops  Abdomen - soft, nontender, nondistended, no masses or organomegaly  Back exam - full range of motion, no tenderness, palpable spasm or pain on motion  Neurological - alert, oriented, normal speech, no focal findings or movement disorder noted, normal f-n-f, no nystagmus, no pronator drift  Musculoskeletal - no joint tenderness, deformity or swelling  Extremities - peripheral pulses normal, no pedal edema, no clubbing or cyanosis  Skin - normal coloration and turgor, no rashes, no suspicious skin lesions noted  MDM  Number of Diagnoses or Management Options  Dizziness:   Hypokalemia:      Amount and/or Complexity of Data Reviewed  Clinical lab tests: ordered and reviewed  Decide to obtain previous medical records or to obtain history from someone other than the patient: yes  Review and summarize past medical records: yes  Independent visualization of images, tracings, or specimens: yes    Patient Progress  Patient progress: improved        ED Course       Procedures    Pt feeling better. VSS. Will d/c with pcp f/u.

## 2018-02-12 NOTE — DISCHARGE INSTRUCTIONS
Mareos: Instrucciones de cuidado - [ Dizziness: Care Instructions ]  Instrucciones de cuidado  Los mareos son [employer] sensación de inestabilidad o confusión en la sosa. Son distintos al vértigo, buck sensación de que la habitación gira o de que usted se mueve o . También es distinto del aturdimiento, que es la sensación de que está a punto de desmayarse. Puede resultar difícil conocer la causa de los [place]. Algunas personas se sienten mareadas cuando tienen migrañas. A veces, los episodios gripales pueden hacer que se sienta mareado. Algunas afecciones médicas, kyle los problemas cardíacos o la presión arterial juan, pueden hacer que se sienta mareado. Muchos medicamentos pueden causar mareos, kyle los que se usan para la presión arterial juan, el dolor o la ansiedad. Si es un medicamento el que está causando los síntomas, glass médico podría recomendarle que lo cambie o deje de tomarlo. Si es un problema cardíaco, podría necesitar medicamentos para ayudar a que glass corazón funcione mejor. Si no hay razón aparente para los síntomas, glass médico podría sugerir vigilar y esperar tee un tiempo para marija si los mareos desaparecen por sí solos. La atención de seguimiento es buck parte clave de glass tratamiento y seguridad. Asegúrese de hacer y acudir a todas las citas, y llame a glass médico si está teniendo problemas. También es buck buena idea saber los resultados de los exámenes y mantener buck lista de los medicamentos que ezequiel. ¿Cómo puede cuidarse en el hogar? · Si glass médico le recomienda o receta medicamentos, tómelos exactamente según las indicaciones. Llame a glass médico si danny estar teniendo un problema con glass medicamento. · No conduzca mientras se sienta mareado. · Trate de prevenir las caídas. Algunas medidas que puede ted son:  Sherry Rosas Usar tapetes antideslizantes, agregar agarraderas cerca de la kasey y usar luces nocturnas.   ¨ Ordenar glass casa de diamond manera que en los senderos no haya nada con lo que se pueda tropezar. ¨ Avisarles a familiares y [facility] que se ha estado sintiendo [place]. [place] les servirá para saber cómo ayudarle. ¿Cuándo debe pedir ayuda? Llame al 911 en cualquier momento que considere que necesita atención de [place]. Por ejemplo, llame si:  ? · Se desmayó (perdió el conocimiento). ? · Tiene mareos junto con síntomas de un ataque cardíaco. Estos pueden incluir:  ¨ Dolor de pecho, o presión o buck sensación extraña en el pecho. ¨ Sudoración. ¨ Falta de aire. ¨ Náuseas o vómito. ¨ Dolor, presión, o buck sensación extraña en la espalda, el sean, la mandíbula o el abdomen superior, o en all o ambos hombros o brazos. ¨ Aturdimiento o debilidad repentina. ¨ Un latido cardíaco rápido o irregular. ? · Tiene síntomas de un ataque cerebral. Estos pueden incluir:  ¨ Entumecimiento, hormigueo, debilidad o parálisis repentinos en la chris, el brazo o la pierna, sobre todo si ocurre en un solo lado del cuerpo. ¨ Cambios súbitos en la vista. ¨ Problemas repentinos para hablar. ¨ Confusión súbita o dificultad repentina para comprender frases sencillas. ¨ Problemas repentinos para caminar o mantener el equilibrio. ¨ Un dolor de sosa intenso y repentino, distinto a los sahara de sosa anteriores. ?Llame a glass médico ahora mismo o busque atención médica inmediata si:  ? · Se siente mareado y tiene Juan, quintin Mora o zjoshua Spayee oídos. ? · Tiene nuevas náuseas y vómito o [address] ? · Loli mareos no desaparecen o regresan. ?Preste especial atención a los cambios en glass jdaen y asegúrese de comunicarse con glass médico si:  ? · No mejora kyle se esperaba. ¿Dónde puede encontrar más información en inglés? Hector Presume a http://jareth-femi.info/. Hank Ramírez [id number] en la búsqueda para aprender más acerca de \"Mareos: Instrucciones de cuidado - [ Dizziness: Care Instructions ]. \"  Revisado: 20 Romy Lai 2017  Versión del contenido: 11.4  © 6669-3988 Healthwise, Incorporated.  Lorenzo Cooper instrucciones de cuidado fueron adaptadas bajo licencia por Good Help Connections (which disclaims liability or warranty for this information). Si usted tiene [place] afección médica o sobre estas instrucciones, siempre pregunte a glass profesional de jaden. [facility], Incorporated niega toda garantía o responsabilidad por glass uso de esta información. Hipocalemia: Instrucciones de cuidado - [ Hypokalemia: Care Instructions ]  Instrucciones de cuidado    La hipocalemia es un nivel bajo de potasio. El corazón, los músculos, los riñones y el sistema nervioso necesitan potasio para funcionar correctamente. Donya problema tiene muchas [place]. Los problemas renales, la alimentación y los medicamentos kyle diuréticos y laxantes pueden causarla. [address] En algunos casos, el cáncer es la causa. Glass médico puede hacerle pruebas para determinar la causa de jayleen niveles bajos de Jose G. Es posible que necesite medicamentos para hacer que jayleen niveles de potasio vuelvan a la normalidad. También podría tener que hacerse análisis de charles con regularidad para revisar el potasio. Si tiene un nivel de potasio muy bajo, podría necesitar medicamentos por vía intravenosa (IV). También podría necesitar pruebas para revisar la actividad eléctrica de glass corazón. Los problemas del corazón causados por los bajos niveles de potasio pueden ser [employer]. La atención de seguimiento es buck parte clave de glass tratamiento y seguridad. Asegúrese de hacer y acudir a todas las citas, y llame a glass médico si está teniendo problemas. También es buck buena idea saber los resultados de los exámenes y mantener buck lista de los medicamentos que ezequiel. ¿Cómo puede cuidarse en el hogar? · Si el médico lo recomienda, consuma alimentos que tengan [employer]. Estos incluyen frutas frescas, jugos y verduras. [address], los frijoles (habichuelas) y [address]   · Sea king con los medicamentos. Si glass médico le receta medicamentos o suplementos de potasio, tómelos según las indicaciones. Llame a glass médico si tiene algún problema con los medicamentos. · Hágase análisis para revisar jayleen niveles de potasio con la frecuencia que le indique el médico.  ¿Cuándo debe pedir ayuda? Llame al 911 en cualquier momento que considere que necesita atención de [place]. Por ejemplo, llame si:  ? · Siente que glass corazón omite latidos. Los problemas del corazón causados por los bajos niveles de potasio pueden provocar la [place]. ? · Se desmayó (perdió el conocimiento). ? · Tiene un episodio de convulsiones. ?Llame a glass médico ahora mismo o busque atención médica inmediata si:  ? · Se siente débil o inusualmente cansado. ? · Tiene jacek calambres en los brazos o las piernas. ? · Tiene hormigueo o entumecimiento. ? · Siente el estómago revuelto, o vomita. ? · Tiene retortijones abdominales. ? · Está abotagado o estreñido. ? · Necesita orinar con mucha frecuencia. ? · Tiene mucha sed la mayor parte del [place]. ? · [employer] o aturdimiento, o que está a punto de [place]. ? · Se siente deprimido o pierde el contacto con la realidad. ?Preste especial atención a los cambios en glass jaden y asegúrese de comunicarse con glass médico si:  ? · No mejora kyle se esperaba. ¿Dónde puede encontrar más información en inglés? Hector Presume a http://jareth-femi.info/. Hank Ramírez [id number] en la búsqueda para aprender más acerca de \"Hipocalemia: Instrucciones de cuidado - [ Hypokalemia: Care Instructions ]. \"  Revisado: 12 hart, 2017  Versión del contenido: 11.4  © 6453-3507 Healthwise, Mc4. Las instrucciones de cuidado fueron adaptadas bajo licencia por Good Help Connections (which disclaims liability or warranty for this information). Si usted tiene [place] afección médica o sobre estas instrucciones, siempre pregunte a glass profesional de jaden.  [employer], [employer]orporated niega toda garantía o responsabilidad por glass uso de esta información. Maniobra de Epley en el hogar para el vértigo: Ejercicios - [ Alvaro Keepers at Home for Vertigo: Exercises ]  Instrucciones de cuidado  El vértigo es buck sensación de que todo gira o le da vueltas cuando usted mueve la sosa. Glass médico puede haberlo [employer]olidated Abdulkadir posiciones para ayudar a que glass vértigo mejore más rápido. [place] se llama maniobra de Epley. Glass médico también puede haberle pedido que alisson estos ejercicios en el hogar. Alisson los ejercicios tan a menudo kyle se lo recomiende el médico. Si glass vértigo [place], glass médico puede hacerle cambiar el ejercicio o decirle que lo interrumpa. Cómo hacer los ejercicios  Paso 1    1. Siéntese al borde de buck cama o un sofá. Paso 2    1. Gire la sosa a 39 grados en la dirección que le haya dicho el médico. Esta puede ser hacia el oído que le causa más vértigo. Paso 3    1. Inclínese hacia atrás [employer] esté recostado de espaldas. Tendría que seguir teniendo la Johan Dolphin a 39 grados. Mirela [place] la sosa en el punto medio entre mirar hacia adelante y mirar hacia el costado. Mantenga esta posición tee 30 segundos. Si tiene vértigo, manténgase en esta posición hasta que se detenga. Paso 4    1. Gire la sosa a 80 grados hacia el oído que tiene Johnny Maureen. Debe tener la punta del mentón alineada con el hombro. Mantenga esta posición tee 30 segundos. Paso 5    1. Gire hacia el lado del oído con menos vértigo. Ahora debería estar mirando al piso. La atención de seguimiento es buck parte clave de glass tratamiento y seguridad. Asegúrese de hacer y acudir a todas las citas, y llame a glass médico si está teniendo problemas. También es buck buena idea saber los resultados de los exámenes y mantener buck lista de los medicamentos que ezequiel. ¿Dónde puede encontrar más información en inglés? Tika Clay a http://jareth-femi.[email or web address]   Ariadna Guzman [id number] en la búsqueda para aprender más acerca de \"Maniobra de Epley en el hogar para el vértigo: Ejercicios - [ Satira Saner at Home for Vertigo: Exercises ]. \"  Revisado: 14 octubre, 2016  Versión del contenido: 11.4  © 8027-8801 Healthwise, Incorporated. Las instrucciones de cuidado fueron adaptadas bajo licencia por Good Help Connections (which disclaims liability or warranty for this information). Si usted tiene [place] afección médica o sobre estas instrucciones, siempre pregunte a glass profesional de jaden. Healthwise, Incorporated niega toda garantía o responsabilidad por glass uso de esta información. We hope that we have addressed all of your medical concerns. The examination and treatment you received in the Emergency Department were for an emergent problem and were not intended as complete care. It is important that you follow up with your healthcare provider(s) for ongoing care. If your symptoms worsen or do not improve as expected, and you are unable to reach your usual health care provider(s), you should return to the Emergency Department. Today's healthcare is undergoing tremendous change, and patient satisfaction surveys are one of the many tools to assess the quality of medical care. You may receive a survey from the [employer] regarding your experience in the Emergency Department. I hope that your experience has been completely positive, particularly the medical care that I provided. As such, please participate in the survey; anything less than excellent does not meet my expectations or intentions. [facility] and [address] [place] [address] participate in nationally recognized quality of care measures. If your blood pressure is greater than 120/80, as reported below, we urge that you seek medical care to address the potential of high blood pressure, commonly known as hypertension.    Hypertension can be hereditary or can be caused by certain medical conditions, pain, stress, or \"white coat syndrome. \"       Please make an appointment with your health care provider(s) for follow up of your Emergency Department visit. VITALS:   Patient Vitals for the past 8 hrs:   Temp Pulse Resp BP SpO2   02/12/18 1500 - 82 19 118/75 100 %   02/12/18 1437 - - - - 100 %   02/12/18 1244 98.2 °F (36.8 °C) (!) 105 16 140/84 100 %          Thank you for allowing us to provide you with medical care today. We realize that you have many choices for your emergency care needs. Please choose us in the future for any continued health care needs. Gardenia Mathew Farnaz Cassia, [facility].   Office: [phone]            Recent Results (from the past 24 hour(s))   EKG, 12 LEAD, INITIAL    Collection Time: 02/12/18  1:42 PM   Result Value Ref Range    Ventricular Rate 81 BPM    Atrial Rate 81 BPM    P-R Interval 144 ms    QRS Duration 78 ms    Q-T Interval 356 ms    QTC Calculation (Bezet) 413 ms    Calculated P Axis 51 degrees    Calculated R Axis 36 degrees    Calculated T Axis 20 degrees    Diagnosis       Normal sinus rhythm    Confirmed by Semaj Dalton M.D., [facility] ([id number]) on 2/12/2018 3:41:25 PM     TROPONIN I    Collection Time: 02/12/18  1:45 PM   Result Value Ref Range    Troponin-I, Qt. <0.04 <0.05 ng/mL   CBC WITH AUTOMATED DIFF    Collection Time: 02/12/18  1:45 PM   Result Value Ref Range    WBC 7.4 3.6 - 11.0 K/uL    RBC 4.73 3.80 - 5.20 M/uL    HGB 14.2 11.5 - 16.0 g/dL    HCT 40.2 35.0 - 47.0 %    MCV 85.0 80.0 - 99.0 FL    MCH 30.0 26.0 - 34.0 PG    MCHC 35.3 30.0 - 36.5 g/dL    RDW 13.0 11.5 - 14.5 %    PLATELET 256 (H) 770 - 400 K/uL    MPV 9.3 8.9 - 12.9 FL    NRBC 0.0 0  WBC    ABSOLUTE NRBC 0.00 0.00 - 0.01 K/uL    NEUTROPHILS 58 32 - 75 %    LYMPHOCYTES 34 12 - 49 %    MONOCYTES 6 5 - 13 %    EOSINOPHILS 1 0 - 7 %    BASOPHILS 0 0 - 1 %    IMMATURE GRANULOCYTES 0 0.0 - 0.5 %    ABS.  NEUTROPHILS 4.3 1.8 - 8.0 K/UL    ABS. LYMPHOCYTES 2.5 0.8 - 3.5 K/UL    ABS. MONOCYTES 0.5 0.0 - 1.0 K/UL    ABS. EOSINOPHILS 0.1 0.0 - 0.4 K/UL    ABS. BASOPHILS 0.0 0.0 - 0.1 K/UL    ABS. IMM. GRANS. 0.0 0.00 - 0.04 K/UL    DF AUTOMATED     METABOLIC PANEL, COMPREHENSIVE    Collection Time: 02/12/18  1:45 PM   Result Value Ref Range    Sodium 141 136 - 145 mmol/L    Potassium 3.3 (L) 3.5 - 5.1 mmol/L    Chloride 106 97 - 108 mmol/L    CO2 26 21 - 32 mmol/L    Anion gap 9 5 - 15 mmol/L    Glucose 98 65 - 100 mg/dL    BUN 12 6 - 20 MG/DL    Creatinine 0.66 0.55 - 1.02 MG/DL    BUN/Creatinine ratio 18 12 - 20      GFR est AA >60 >60 ml/min/1.73m2    GFR est non-AA >60 >60 ml/min/1.73m2    Calcium 8.9 8.5 - 10.1 MG/DL    Bilirubin, total 0.3 0.2 - 1.0 MG/DL    ALT (SGPT) 14 12 - 78 U/L    AST (SGOT) 14 (L) 15 - 37 U/L    Alk. phosphatase 54 45 - 117 U/L    Protein, total 8.2 6.4 - 8.2 g/dL    Albumin 3.9 3.5 - 5.0 g/dL    Globulin 4.3 (H) 2.0 - 4.0 g/dL    A-G Ratio 0.9 (L) 1.1 - 2.2     LIPASE    Collection Time: 02/12/18  1:45 PM   Result Value Ref Range    Lipase 143 73 - 393 U/L       No results found.

## 2018-02-12 NOTE — ED TRIAGE NOTES
Triage done with Stanley- pt stated she has \"POTS\" and has been having dizziness and palpitations, trouble breathing and trouble catching her breath , symptoms x 2-3 months , denies fever, +nausea , pt stated she has costochondritis when she gets agitated

## 2018-02-15 ENCOUNTER — OFFICE VISIT (OUTPATIENT)
Dept: CARDIOLOGY CLINIC | Age: 38
End: 2018-02-15

## 2018-02-15 VITALS
HEART RATE: 88 BPM | OXYGEN SATURATION: 99 % | WEIGHT: 137.2 LBS | RESPIRATION RATE: 16 BRPM | SYSTOLIC BLOOD PRESSURE: 120 MMHG | BODY MASS INDEX: 24.31 KG/M2 | HEIGHT: 63 IN | DIASTOLIC BLOOD PRESSURE: 70 MMHG

## 2018-02-15 DIAGNOSIS — G90.A POTS (POSTURAL ORTHOSTATIC TACHYCARDIA SYNDROME): Primary | ICD-10-CM

## 2018-02-15 DIAGNOSIS — R07.89 CHEST PAIN, ATYPICAL: ICD-10-CM

## 2018-02-15 DIAGNOSIS — E05.90 SUBCLINICAL HYPERTHYROIDISM: ICD-10-CM

## 2018-02-15 DIAGNOSIS — E87.6 HYPOKALEMIA: ICD-10-CM

## 2018-02-15 RX ORDER — PROPRANOLOL HYDROCHLORIDE 60 MG/1
60 CAPSULE, EXTENDED RELEASE ORAL 2 TIMES DAILY
Qty: 60 CAP | Refills: 5 | Status: SHIPPED | OUTPATIENT
Start: 2018-02-15 | End: 2018-04-19 | Stop reason: SDUPTHER

## 2018-02-15 NOTE — MR AVS SNAPSHOT
[address] [place] 
[phone] Patient: Crystal Long MRN: [id number] Galen Arias Visit Information Jane Joanne y Hortencia Personal Médico Departamento Teléfono del Dep. Número de visita 2/15/2018  9:00 AM Debbie Mazariegos MD [facility] [phone] Your Appointments 3/8/2018  1:45 PM  
New Patient with Jessy Pastor MD  
[facility]) Appt Note: Np est PCP . Pt speaks Filipino. pt has lots of patrick issues. 02.07.18 ALG  
 [address] [place] [address] [address] [facility] [address]  
[address] [place] [address]  
  
    
 3/9/2018 11:40 AM  
Follow Up with [address] [place], [place] [address]DO [facility]) Appt Note: 3 month follow up KRU 1/26; follow up JR 2/6/18  
 [address] [place] [address] [address] [facility] [address]  
[address]  [address]  
  
    
 3/22/2018 10:50 AM  
Follow Up with Moody Lynch MD  
[facility]) Appt Note: Follow up thyroid; Follow up thyroid [facility] [address] [address] [address] [place] [address] Road 5/10/2018 11:20 AM  
ESTABLISHED PATIENT with Carlos Cuenca MD  
[facility] ([facility]) Appt Note: 6 mnth f/u  
 [address] [place] [address]  
[address] [facility] 7 [address] Upcoming Health Maintenance Date Due DTaP/Tdap/Td series (1 - Tdap) 8/8/2000 Influenza Age 5 to Adult 8/1/2017 PAP AKA CERVICAL CYTOLOGY 2/2/2021 Alergias  Review Complete El: 2/15/2018 Por: Jordan Chang A partir del:  2/15/2018 No Known Allergies Vacunas actuales Revisadas el:  2/2/2018 No hay ninguna vacuna archivada. No revisadas esta visita Partes vitales PS Pulso Resp [place] ( percentil de crecimiento) Peso (percentil de crecimiento) SpO2  
 120/70 (BP 1 Location: Left arm, BP Patient Position: Sitting) 88 16 5' 3\" (1.6 m) 137 lb 3.2 oz (62.2 kg) 99% BMI [facility]) Estado obstétrico Estatus de tabaquísmo 24.3 kg/m2 Having regular periods Never Smoker Historial de signos vitales BMI and BSA Data Body Mass Index Body Surface Area  
 24.3 kg/m 2 1.66 m 2 [facility] Name Phone [address]ammie [address] [place] [address]  [place] [phone] Glass lista de medicamentos actualizada Lista actualizada el: 2/15/18  9:35 AM.  Gloriajean Fast use glass lista de medicamentos más reciente. biotin 1,000 mcg Chew Take  by mouth. ibuprofen 200 mg tablet También conocido kyle:  MOTRIN Take 2 Tabs by mouth every eight (8) hours as needed for Pain. meclizine 12.5 mg tablet También conocido kyle:  ANTIVERT Take 1 Tab by mouth three (3) times daily as needed for Dizziness. propranolol LA 60 mg SR capsule También conocido kyle:  INDERAL LA Take 1 Cap by mouth two (2) times a day. Indications: POTS Recetas Enviado a la Rich Refills  
 propranolol LA (INDERAL LA) 60 mg SR capsule 5 Sig: Take 1 Cap by mouth two (2) times a day. Indications: POTS Class: Normal  
 Pharmacy: [facility] [address] #: [phone] Route: Oral  
  
Por hacer 02/25/2018 8:30 PM  
  Appointment with BEDROOM [facility] ([phone]) 1. Do not take a nap the day of the study  2.  No caffeine after 12 noon the day of the study  3. Bring a 2 piece sleeping garment  4. Hair should be clean and dry, no oils, sprays, powders and remove wigs, weaves or other hair accessories  6. Patient should eat dinner prior to arriving for the test and a light breakfast will be provided upon discharge in the morning  7. Patient encouraged to bring activity items such as books, magazines, laptop, IPAD, etc, as well as toiletry items needed for the next morning  8. Bring all medications with you to the center  9. For specific questions please contact the sleep center directly, [phone] to 5p  10. Do not arrive more than 15 minutes prior to appt time and use the doorbell to enter the sleep center. Instrucciones para el Paciente Your appointment with Dr. Devika Ellington has been moved to 3-13-18 @ 140 PM. There were changes made to your medications at this appointment. Please note the following: 
INCREASE propranolol LA ( Inderal LA) to twice a day Introducing [facility]! [facility] introduce portal paciente MyChart . Ahora se puede acceder a partes de glass expediente médico, enviar por correo electrónico la oficina de glass médico y solicitar renovaciones de medicamentos en línea. En glass navegador de Internet , Particia Neil a [email or web address] Shyanne clic en el usuario por Corin Tavera? Corie Force clic aquí en la sesión Davied Bucklin. Verá la página de registro [place]. Ingrese glass código de [employer] diamond y kyle aparece a continuación. Usted no tendrá que [employer] código después de jackie completado el proceso de registro . Si usted no se inscribe antes de la fecha de caducidad , debe solicitar un nuevo código. · MyChart Código de acceso : [id number] Expires: 5/2/2018 12:29 PM 
 
Ingresa los últimos cuatro dígitos de glass Número de Seguro Social ( xxxx ) y fecha de nacimiento ( dd / mm / aaaa ) kyle se indica y shyanne clic en Enviar. Usted será llevado a la siguiente página de registro . Crear un ID MyChart . Esta será glass ID de inicio de sesión de MyChart y no puede ser [place] , por lo que pensar en buck que es General Ko y fácil de recordar . Crear buck contraseña MyChart . Usted puede cambiar glass contraseña en cualquier momento . Ingrese glass Password Reset de preguntas y [place] . Gillis se puede utilizar en un momento posterior si usted olvida glass contraseña. Introduzca galss dirección de correo electrónico . Jayy Michele recibirá buck notificación por correo electrónico cuando la nueva información está disponible en MyChart . Moisés Days clic en Registrarse. Gelene Felipe marija y descargar porciones de glass expediente médico. 
Alisson clic en el enlace de descarga del menú Resumen para descargar buck copia portátil de glass información médica . Si tiene [employer] , por favor visite la sección de preguntas frecuentes del sitio web MyChart . Recuerde, MyChart NO es que se utilizará para las necesidades urgentes. Para emergencias médicas , llame al 911 . Ahora disponible en glass iPhone y Android ! Por favor proporcione omari resumen de la documentación de cuidado a glass próximo proveedor. Your primary care clinician is listed as Doug Fisher. If you have any questions after today's visit, please call [phone].

## 2018-02-15 NOTE — PROGRESS NOTES
Shiva Hodge     1980       Mattie Ridley MD, [facility]  Date of Visit-2/15/2018   PCP is Leigh Allen MD   [facility]  [facility]  HPI:  Shiva Hodge is a 44 y.o. female   Pt with tachycardia had been on metoprolol, echo in September was normal, Holter on September showed ectopics with persistence of tachycardia, tilt table on 10/27/17 showed her HR abruptly increased and Dr. Devon Miller thought she had POTS, she was placed on Indural since she did not tolerate metoprolol or diltiazem. She has also had some occasional chest pain. When seen on 1/25/18 recommended her restart Inderral and follow up with Dr. Devon Miller in May. She went to the ED on 2/12/18, she was started on meclizine, she had a normal EKG and her potassium was 3.3. Other labs were unremarkable. The pt states that her palpitations have calmed down since started back on the Inderal. She is now taking her medication at night as prescribed by Dr. Devon Miller. The pt states that 8 hours after taking her medication her palpitations and tachycardia come back and are worse. Her symptoms are most pronounced in the morning. The pt reports that on 2/12/18 when she went to the ED she had taken her medication in the evening and then started having symptoms that day. While in the ED they did not change her medication only started her on meclizine for the dizziness she was experiencing and gave her potassium as her labs came back low. The pt is still taking her medications and has not stopped them. She still continues to have stabbing chest pain that radiates into her back. Denies edema, syncope or shortness of breath at rest,    Assessment/Plan:     1. POTS, improved with inderal but seems to wear off in the morning, will change to BID will confer with Dr. Devon Miller about other options and move her appointment up.     2. Chest pain, atypical sounding, has been reviewed before, echo was normal and troponin was normal in the ED. 3. Hypokalemia, in the ED hard to know what one value means, have asked her to eat bananas daily, can be rechecked after EP appointment    4. Hypothyroidism, followed by endocrinology   Lab Results   Component Value Date/Time    TSH 0.433 (L) 08/23/2017 03:24 PM      Patient Care Team:  Luisa Soto MD as PCP - General (Internal Medicine)  Luisa Soto MD (Internal Medicine)  Antoni Carson MD (Cardiology)  [address] [place] [address] as Physician (Neurology)  Racheal Dubois MD (Cardiology)  Chris Moncada MD (Endocrinology)       Impression: No diagnosis found. Cardiac History:   ECHO 9-13-17 WNL   HOLTER 9-13-17 rare sinus arrhythmia ; 26 PACs, rare PVCs; sinus at  no prolonged tachycardia      ROS-except as noted above. . A complete cardiac and respiratory are reviewed and negative except as above ; Resp-denies wheezing  or productive cough,. Const- No unusual weight loss or fever; Neuro-no recent seizure or CVA ; GI- No BRBPR, abdom pain, bloating ; - no  hematuria   see supplement sheet, initialed and to be scanned by staff  Past Medical History:   Diagnosis Date    Hypertension     Multiple thyroid nodules     Palpitations     POTS (postural orthostatic tachycardia syndrome)     Thyroid nodule       Social Hx= reports that she has never smoked. She has never used smokeless tobacco. She reports that she does not drink alcohol or use illicit drugs. Exam and Labs:  /70 (BP 1 Location: Left arm, BP Patient Position: Sitting)  Pulse 88  Resp 16  Ht 5' 3\" (1.6 m)  Wt 137 lb 3.2 oz (62.2 kg)  SpO2 99%  BMI 24.3 kg/[id number]:  NAD, comfortable  Head: NC,AT. Eyes: No scleral icterus. Neck:  Neck supple. No JVD present. Throat: moist mucous membranes. Chest: Effort normal & normal respiratory excursion . Neurological: alert, conversant and oriented . Skin: Skin is not cold. No obvious systemic rash noted. Not diaphoretic. No erythema. Psychiatric:  Grossly normal mood and affect. Behavior appears normal. Extremities:  no clubbing or cyanosis. Abdomen: non distended    Lungs:breath sounds normal. No stridor. distress, wheezes or  Rales. Heart: normal rate, regular rhythm, normal S1, S2, no murmurs, rubs, clicks or gallops , PMI non displaced. Edema: Edema is none. No results found for: CHOL, CHOLX, CHLST, CHOLV, HDL, LDL, LDLC, DLDLP, TGLX, TRIGL, TRIGP, CHHD, CHHDX  Lab Results   Component Value Date/Time    Sodium 141 02/12/2018 01:45 PM    Potassium 3.3 (L) 02/12/2018 01:45 PM    Chloride 106 02/12/2018 01:45 PM    CO2 26 02/12/2018 01:45 PM    Anion gap 9 02/12/2018 01:45 PM    Glucose 98 02/12/2018 01:45 PM    BUN 12 02/12/2018 01:45 PM    Creatinine 0.66 02/12/2018 01:45 PM    BUN/Creatinine ratio 18 02/12/2018 01:45 PM    GFR est AA >60 02/12/2018 01:45 PM    GFR est non-AA >60 02/12/2018 01:45 PM    Calcium 8.9 02/12/2018 01:45 PM      Wt Readings from Last 3 Encounters:   02/15/18 137 lb 3.2 oz (62.2 kg)   02/12/18 133 lb 6 oz (60.5 kg)   02/02/18 132 lb 12.8 oz (60.2 kg)      BP Readings from Last 3 Encounters:   02/15/18 120/70   02/12/18 125/71   02/02/18 132/68      Current Outpatient Prescriptions   Medication Sig    meclizine (ANTIVERT) 12.5 mg tablet Take 1 Tab by mouth three (3) times daily as needed for Dizziness.  ibuprofen (MOTRIN) 200 mg tablet Take 2 Tabs by mouth every eight (8) hours as needed for Pain.  propranolol LA (INDERAL LA) 60 mg SR capsule Take 1 Cap by mouth nightly. Indications: POTS    biotin 1,000 mcg chew Take  by mouth. No current facility-administered medications for this visit. Impression see above.       Written by Yousif Rene, as dictated by Maikel Montalvo MD.

## 2018-02-28 ENCOUNTER — TELEPHONE (OUTPATIENT)
Dept: OBGYN CLINIC | Age: 38
End: 2018-02-28

## 2018-02-28 NOTE — TELEPHONE ENCOUNTER
----- Message from April SHAUNA Sorto sent at 2/28/2018 10:15 AM EST -----      ----- Message -----     From: Adrienne Jennings LPN     Sent: 2/06/2811   8:33 AM       To: Alexandria Sorto        ----- Message -----     From: Giuseppe Lakhani MD     Sent: 2/7/2018   8:51 AM       To: Monae Preciado LPN    Speaks Setswana. Notify NILM pap. Flagyl rx (aleady sent for BV) should cover trich. Send rx for Azithromax 1g PO once.

## 2018-02-28 NOTE — PROGRESS NOTES
Me   2/28/18 3:43 PM   Note   Patient notified in native language (Serbian) and verbalized understanding. Already picked up medications.

## 2018-02-28 NOTE — TELEPHONE ENCOUNTER
Patient notified in native language (Central African) and verbalized understanding. Already picked up medications.

## 2018-03-08 ENCOUNTER — OFFICE VISIT (OUTPATIENT)
Dept: INTERNAL MEDICINE CLINIC | Age: 38
End: 2018-03-08

## 2018-03-08 VITALS
BODY MASS INDEX: 23.41 KG/M2 | HEART RATE: 90 BPM | HEIGHT: 63 IN | RESPIRATION RATE: 16 BRPM | SYSTOLIC BLOOD PRESSURE: 123 MMHG | OXYGEN SATURATION: 99 % | TEMPERATURE: 98 F | DIASTOLIC BLOOD PRESSURE: 75 MMHG | WEIGHT: 132.13 LBS

## 2018-03-08 DIAGNOSIS — G90.A POTS (POSTURAL ORTHOSTATIC TACHYCARDIA SYNDROME): ICD-10-CM

## 2018-03-08 DIAGNOSIS — E05.90 SUBCLINICAL HYPERTHYROIDISM: ICD-10-CM

## 2018-03-08 DIAGNOSIS — E04.2 MULTINODULAR GOITER: ICD-10-CM

## 2018-03-08 DIAGNOSIS — R00.2 HEART PALPITATIONS: Primary | ICD-10-CM

## 2018-03-08 DIAGNOSIS — E87.6 HYPOKALEMIA: ICD-10-CM

## 2018-03-08 DIAGNOSIS — Z76.89 ENCOUNTER TO ESTABLISH CARE: ICD-10-CM

## 2018-03-08 NOTE — PROGRESS NOTES
History of Present Illness:   Tony Finnegan is a 44 y.o. female here for evaluation:    Speaks only [place] History, exam and education/communication with pt via [employer]  #[id number] [place] Chief Complaint   Patient presents with   [address]     heart condition for low potassium      She is here to establish care. She has seen Dr. Libertad Stone and Dr. Jessica Thayer for eval and mgt. Reviewed last note with Dr. Libertad Stone from Feb 2018. She notes has concerns about following. She reviewed concerns and eval to date with cardiology, from her perspective to explain her concerns, as translated below:  --she is aware of her prior palpitations, and prior trial of metoprolol  --she feels she has not had good explanation of the cause of her symptoms. --she is not sure if the low potassium or other low vitamin levels may be contributing to her symptoms  --she has learned a lot from reading on her own, and notes she still has questions    Reviewed cardiology has done testing for treatable non-cardiac causes of her cardiac symptoms. She was aware of testing reveiwed. She was not sure if potassium level should be treated--reviewed would defer to Dr. Libertad Stone.    Reviewed prior thyroid studies. Dr. Jorene Riedel did not think she had symptoms or labs suggesting need to treat subclincal hyperthyroidism. He ahd reviewed labs and repeat labs, including metanephrines, and thyroid biopsy result. Prior to Admission medications    Medication Sig Start Date End Date Taking? Authorizing Provider   propranolol LA (INDERAL LA) 60 mg SR capsule Take 1 Cap by mouth two (2) times a day. Indications: POTS 2/15/18  Yes Gen Katz MD   biotin 1,000 mcg chew Take  by mouth. Yes Historical Provider   meclizine (ANTIVERT) 12.5 mg tablet Take 1 Tab by mouth three (3) times daily as needed for Dizziness.  2/12/18   Lucy Alvarado MD   ibuprofen (MOTRIN) 200 mg tablet Take 2 Tabs by mouth every eight (8) hours as needed for Pain. 1/25/18   Maikel Montalvo MD        ROS Complete ROS negative except as indicated in note. Vitals:    03/08/18 1354   BP: 123/75   Pulse: 90   Resp: 16   Temp: 98 °F (36.7 °C)   TempSrc: Oral   SpO2: 99%   Weight: 132 lb 2 oz (59.9 kg)   Height: 5' 3\" (1.6 m)   PainSc:   0 - No pain   LMP: 02/17/2018        Physical Exam:     Physical Exam   Constitutional: She appears well-developed and well-nourished. No distress. HENT:   Head: Normocephalic and atraumatic. Eyes: Conjunctivae are normal. Right eye exhibits no discharge. Left eye exhibits no discharge. No scleral icterus. Neck: Neck supple. Cardiovascular: Normal rate, regular rhythm, normal heart sounds and intact distal pulses. Exam reveals no gallop and no friction rub. No murmur heard. Pulmonary/Chest: Effort normal and breath sounds normal. No respiratory distress. She has no wheezes. She has no rales. She exhibits no tenderness. Abdominal: Soft. Bowel sounds are normal. She exhibits no distension. There is no tenderness. Musculoskeletal: She exhibits no edema or tenderness. Neurological: She is alert. She exhibits normal muscle tone. Coordination normal.   Skin: Skin is warm. No rash noted. She is not diaphoretic. No erythema. No pallor. Psychiatric: She has a normal mood and affect. Her behavior is normal. Judgment and thought content normal.       Assessment and Plan:       ICD-10-CM ICD-9-CM    1. Heart palpitations R00.2 785.1    2. POTS (postural orthostatic tachycardia syndrome) R00.0 427.89     I95.1     3. Multinodular goiter E04.2 241.1    4. Subclinical hyperthyroidism E05.90 242.90    5. Encounter to establish care Z76.89 V65.8    6. Hypokalemia R52.1 486.2 METABOLIC PANEL, BASIC      MAGNESIUM       1,2,6:  Follow-up with cardiology reviewed. 6.  Repeat testing reviewed. She will contact cardiology next week to review labs and symptoms/follow-up.       Follow-up Disposition:  Return in about 3 months (around 6/8/2018), or if symptoms worsen or fail to improve, for referral follow-up.  lab results and schedule of future lab studies reviewed with patient  reviewed medications and side effects in detail    For additional documentation of information and/or recommendations discussed this visit, please see notes in instructions. Plan and evaluation (above) reviewed with pt at visit  Patient voiced understanding of plan and provided with time to ask/review questions. After Visit Summary (AVS) provided to pt after visit with additional instructions as needed/reviewed. Addendum:  Messaged cardiology after visit.

## 2018-03-08 NOTE — MR AVS SNAPSHOT
[address]  [address] [facility] [address] 
[phone] Patient: Zaid Liang MRN: [id number] Juju Lock Visit Information Andrew Tafoya y Hortencia Personal Médico Departamento Teléfono del Dep. Número de visita 3/8/2018  1:45 PM Edith Ghosh, [address] [place] [address], Ne and Internal Medicine [phone] Follow-up Instructions Return in about 3 months (around 6/8/2018), or if symptoms worsen or fail to improve, for referral follow-up. Your Appointments 3/9/2018 11:40 AM  
Follow Up with DO [facility] at [address] [place] [address]) Appt Note: 3 month follow up KRU 1/26; follow up JR 2/6/18  
 [address] [facility] [address]  
[phone]  
  
   
 [address] [address]  
  
    
 5/10/2018 11:20 AM  
ESTABLISHED PATIENT with Dina Posadas MD  
[facility] ([address] [place] [address]) Appt Note: 6 mnth f/u  
 [address] [facility] [address] [facility] [address]  
[facility] [address] [facility] [address]  
  
    
 6/11/2018  3:50 PM  
Follow Up with Blair Galan MD  
[facility] [address] [place] [address]) Appt Note: Follow up thyroid; Follow up thyroid; Follow up thyroid Follow up thyroid; f/u Thyroid One [facility] [phone] [address] [place] [address] Upcoming Health Maintenance Date Due DTaP/Tdap/Td series (1 - Tdap) 8/8/2000 Influenza Age 5 to Adult 8/1/2017 PAP AKA CERVICAL CYTOLOGY 2/2/2021 Alergias  Review Complete El: 3/8/2018 Por: MD Ricardo Pizano del:  3/8/2018 No Known Allergies Vacunas actuales Revisadas el:  2/2/2018 No hay ninguna vacuna archivada. No revisadas esta visita You Were Diagnosed With   
  
 [address] Heart palpitations    -  Primary ICD-10-CM: R00.2 ICD-9-CM: 785.1 POTS (postural orthostatic tachycardia syndrome)     ICD-10-CM: R00.0, I95.1 ICD-9-CM: 427.89   
 Multinodular goiter     ICD-10-CM: E04.2 ICD-9-CM: 241.1 Subclinical hyperthyroidism     ICD-10-CM: E05.90 ICD-9-CM: 242.90 Encounter to establish care     ICD-10-CM: Z76.89 
ICD-9-CM: V65.8 Hypokalemia     ICD-10-CM: E87.6 ICD-9-CM: 276.8 Partes vitales PS Pulso Temperatura Resp [place] ( percentil de crecimiento) Peso (percentil de crecimiento) 123/75 (BP 1 Location: Left arm, BP Patient Position: Sitting) 90 98 °F (36.7 °C) (Oral) 16 5' 3\" (1.6 m) 132 lb 2 oz (59.9 kg) LMP (última vishal) SpO2 BMI ([facility]) Estado obstétrico Estatus de tabaquísmo 02/17/2018 99% 23.4 kg/m2 Having regular periods Never Smoker BMI and BSA Data Body Mass Index Body Surface Area  
 23.4 kg/m 2 1.63 m 2 [facility] Name Phone [address] [facility] [address]  [place] [phone] Aggarwal lista de medicamentos actualizada Aston Lento actualizada 3/8/18  2:58 PM.  Rexanne Plain use aggarwal lista de medicamentos más reciente. biotin 1,000 mcg Chew Take  by mouth. ibuprofen 200 mg tablet También conocido kyle:  MOTRIN Take 2 Tabs by mouth every eight (8) hours as needed for Pain. meclizine 12.5 mg tablet También conocido kyle:  ANTIVERT Take 1 Tab by mouth three (3) times daily as needed for Dizziness. propranolol LA 60 mg SR capsule También conocido kyle:  INDERAL LA Take 1 Cap by mouth two (2) times a day. Indications: POTS Hicimos lo siguiente MAGNESIUM [id number] CPT(R)] METABOLIC PANEL, BASIC [22721 CPT(R)] Instrucciones de seguimiento Return in about 3 months (around 6/8/2018), or if symptoms worsen or fail to improve, for referral follow-up. Por hacer 04/11/2018 8:30 PM  
  Appointment with BEDROOM [facility]; BEDROOM 7 [facility] at [facility] ([phone]) 1. Do not take a nap the day of the study  2. No caffeine after 12 noon the day of the study  3. Bring a 2 piece sleeping garment  4. Arrive 15 minutes prior to the appointment time. Ring buzzer to get into the building. 5. Hair should be clean and dry, no oils, sprays, powders and remove wigs, weaves or other hair accessories  6. Patient should eat dinner prior to arriving for the test and a light breakfast will be provided upon discharge in the morning  7. Patient may bring books, magazines, etc, and any toiletry items needed for the next morning  8. Bring all medications with you to the center  9. For specific questions please contact the sleep center directly, 830a to 5p  10. Patient's guardian/caregiver must remain in room during testing and provide personal needs for patient. Instrucciones para el Paciente Will message Dr. Derrek Gilford about your potassium and POTS management as reviewed. If you do not hear from his office by early next week (labs will result by tomorrow afternoon), call his office directly to clarify plan, as reviewed. Introducing [facility] [place] [facility]! [facility] introduce portal paciente MyChart . Ahora se puede acceder a partes de glass expediente médico, enviar por correo electrónico la oficina de glass médico y solicitar renovaciones de medicamentos en línea. En glass navegador de Internet , Patricia Velasquez a [email or web address] Alisson clic en el usuario por Daniel Alcaraz? Rodrigo Aj clic aquí en la sesión Billjacqueline Teaticket. Verá la página de registro Ribera. Ingrese glass código de Bank  Maricruz diamond y kyle aparece a continuación.  Usted no tendrá que [employer] código después de jackie completado el proceso de registro . Si usted no se inscribe antes de la fecha de caducidad , debe solicitar un nuevo código. · MyChart Código de acceso : [id number] Expires: 5/2/2018 12:29 PM 
 
Ingresa los últimos cuatro dígitos de glass Número de Seguro Social ( xxxx ) y fecha de nacimiento ( dd / mm / aaaa ) kyle se indica y alisson clic en Enviar. Usted será llevado a la siguiente página de registro . Crear un ID MyChart . Esta será glass ID de inicio de sesión de MyChart y no puede ser [place] , por lo que pensar en buck que es Marian [place] y fácil de recordar . Crear buck contraseña MyChart . Usted puede cambiar glass contraseña en cualquier momento . Ingrese glass Password Reset de preguntas y Esp[place] . [place] se puede utilizar en un momento posterior si usted olvida glass contraseña. Introduzca glass dirección de correo electrónico . Donte Alvarenga recibirá buck notificación por correo electrónico cuando la nueva información está disponible en MyChart . Lars Morro clic en Registrarse. Luis Enrique Levans marija y descargar porciones de glass expediente médico. 
Alisson clic en el enlace de descarga del menú Resumen para descargar buck copia portátil de glass información médica . Si tiene [employer] , por favor visite la sección de preguntas frecuentes del sitio web MyChart . Recuerde, MyChart NO es que se utilizará para las necesidades urgentes. Para emergencias médicas , llame al 911 . Ahora disponible en glass iPhone y Android ! Por favor proporcione omari resumen de la documentación de cuidado a glsas próximo proveedor. Your primary care clinician is listed as Judye George. If you have any questions after today's visit, please call [phone].

## 2018-03-08 NOTE — PATIENT INSTRUCTIONS
Will message Dr. Zaheer Esposito about your potassium and POTS management as reviewed. If you do not hear from his office by early next week (labs will result by tomorrow afternoon), call his office directly to clarify plan, as reviewed.

## 2018-03-08 NOTE — PROGRESS NOTES
RM 18   # [id number]  [place]  Chief Complaint   Patient presents with   [address]     1. Have you been to the ER, urgent care clinic since your last visit? Hospitalized since your last visit? No    2. Have you seen or consulted any other health care providers outside of the [address] [place] [address] David since your last visit? Include any pap smears or colon screening. Yes 2017, Dr. Uribe Frame Maintenance Due   Topic Date Due    DTaP/Tdap/Td series (1 - Tdap) 08/08/2000    Influenza Age 5 to Adult  08/01/2017     Patient declined flu vaccine.      Learning Assessment 3/8/2018   PRIMARY LEARNER Patient   HIGHEST LEVEL OF EDUCATION - PRIMARY LEARNER  SOME COLLEGE   BARRIERS PRIMARY LEARNER LANGUAGE   PRIMARY LANGUAGE Urdu   LEARNER PREFERENCE PRIMARY READING   ANSWERED BY patient   RELATIONSHIP SELF     PHQ over the last two weeks 3/8/2018   Little interest or pleasure in doing things Not at all   Feeling down, depressed or hopeless Not at all   Total Score PHQ 2 0

## 2018-03-09 ENCOUNTER — OFFICE VISIT (OUTPATIENT)
Dept: NEUROLOGY | Age: 38
End: 2018-03-09

## 2018-03-09 VITALS
DIASTOLIC BLOOD PRESSURE: 70 MMHG | BODY MASS INDEX: 23.39 KG/M2 | SYSTOLIC BLOOD PRESSURE: 140 MMHG | RESPIRATION RATE: 20 BRPM | WEIGHT: 132 LBS | HEIGHT: 63 IN

## 2018-03-09 DIAGNOSIS — G43.709 CHRONIC MIGRAINE W/O AURA W/O STATUS MIGRAINOSUS, NOT INTRACTABLE: Primary | ICD-10-CM

## 2018-03-09 LAB
BUN SERPL-MCNC: 20 MG/DL (ref 6–20)
BUN/CREAT SERPL: 33 (ref 9–23)
CALCIUM SERPL-MCNC: 10.1 MG/DL (ref 8.7–10.2)
CHLORIDE SERPL-SCNC: 100 MMOL/L (ref 96–106)
CO2 SERPL-SCNC: 24 MMOL/L (ref 18–29)
CREAT SERPL-MCNC: 0.61 MG/DL (ref 0.57–1)
GLUCOSE SERPL-MCNC: 92 MG/DL (ref 65–99)
MAGNESIUM SERPL-MCNC: 2.1 MG/DL (ref 1.6–2.3)
POTASSIUM SERPL-SCNC: 4.6 MMOL/L (ref 3.5–5.2)
SODIUM SERPL-SCNC: 140 MMOL/L (ref 134–144)

## 2018-03-09 RX ORDER — TOPIRAMATE 25 MG/1
25 TABLET ORAL 2 TIMES DAILY
Qty: 60 TAB | Refills: 3 | Status: SHIPPED | OUTPATIENT
Start: 2018-03-09 | End: 2018-04-19

## 2018-03-09 RX ORDER — GABAPENTIN 100 MG/1
100 CAPSULE ORAL
Qty: 30 CAP | Refills: 3 | Status: SHIPPED | OUTPATIENT
Start: 2018-03-09 | End: 2018-04-19

## 2018-03-09 NOTE — PROGRESS NOTES
Chief Complaint   Patient presents with    Migraine    Numbness       HPI    Alex Webber is a 41-year-old woman with pots and chronic migraine here to follow-up. Last visit we initiated topiramate and she has had very good control at low dose medication. No severe migraines. She is complaining of some unusual paresthesias throughout her body affecting hands and scalp which preexisted topiramate initiation. She is going to be seeing a sleep specialist soon. Pots is being managed by cardiology with some medication changes ongoing.  [id number] utilized. Review of Systems   Neurological: Positive for tingling and sensory change. All other systems reviewed and are negative. Past Medical History:   Diagnosis Date    Chlamydia 02/2018    Hypertension     Multiple thyroid nodules     Palpitations     POTS (postural orthostatic tachycardia syndrome)     Thyroid nodule      Family History   Problem Relation Age of Onset    Kidney Disease Mother      MARCUS    Hypertension Father     Diabetes Father     Kidney Disease Maternal Grandmother     Hypertension Maternal Grandmother     Cancer Maternal Grandfather      sto,acj    Hypertension Maternal Grandfather     No Known Problems Paternal Grandmother     No Known Problems Paternal Grandfather     No Known Problems Sister     No Known Problems Brother     Thyroid Disease Cousin      x4    Thyroid Cancer Neg Hx      Social History     Social History    Marital status: SINGLE     Spouse name: N/A    Number of children: N/A    Years of education: N/A     Occupational History    Not on file.      Social History Main Topics    Smoking status: Never Smoker    Smokeless tobacco: Never Used    Alcohol use No    Drug use: No    Sexual activity: No     Other Topics Concern    Not on file     Social History Narrative    ** Merged History Encounter **          No Known Allergies      Current Outpatient Prescriptions   Medication Sig    topiramate (TOPAMAX) 25 mg tablet Take 1 Tab by mouth two (2) times a day.  gabapentin (NEURONTIN) 100 mg capsule Take 1 Cap by mouth nightly.  propranolol LA (INDERAL LA) 60 mg SR capsule Take 1 Cap by mouth two (2) times a day. Indications: POTS    biotin 1,000 mcg chew Take  by mouth.  meclizine (ANTIVERT) 12.5 mg tablet Take 1 Tab by mouth three (3) times daily as needed for Dizziness.  ibuprofen (MOTRIN) 200 mg tablet Take 2 Tabs by mouth every eight (8) hours as needed for Pain. No current facility-administered medications for this visit. Neurologic Exam     Mental Status        WD/WN adult in NAD, normal grooming  VSS  A&O x 3    PERRL, nonicteric  Face is symmetric, tongue midline  Speech is fluent and clear  No limb ataxia. No abnl movements. Moving all extemities spontaneously and symmetric  Normal gait    CVS RRR  Lungs nonlabored  Skin is warm and dry         Visit Vitals    /70    Resp 20    Ht 5' 3\" (1.6 m)    Wt 59.9 kg (132 lb)    LMP 02/17/2018    BMI 23.38 kg/m2       Assessment and Plan   Diagnoses and all orders for this visit:    1. Chronic migraine w/o aura w/o status migrainosus, not intractable    Other orders  -     topiramate (TOPAMAX) 25 mg tablet; Take 1 Tab by mouth two (2) times a day. -     gabapentin (NEURONTIN) 100 mg capsule; Take 1 Cap by mouth nightly. 26-year-old woman with chronic migraine headaches having good response to low-dose topiramate. No change to that medication. I did discuss with her that some of the known side effects as paresthesias. She is going to complete the sleep evaluation and then observe her symptoms. I am going to give her a trial of gabapentin at bedtime since the symptoms seem to affect her sleep. I would like to see her in 3 months.         [address] [facility], [address]  Diplomate AGGIE

## 2018-03-09 NOTE — COMMUNICATION BODY
Chief Complaint   Patient presents with    Migraine    Numbness       HPI    Rosalinda De Santiago is a 78-year-old woman with pots and chronic migraine here to follow-up. Last visit we initiated topiramate and she has had very good control at low dose medication. No severe migraines. She is complaining of some unusual paresthesias throughout her body affecting hands and scalp which preexisted topiramate initiation. She is going to be seeing a sleep specialist soon. Pots is being managed by cardiology with some medication changes ongoing.  [id number] utilized. Review of Systems   Neurological: Positive for tingling and sensory change. All other systems reviewed and are negative. Past Medical History:   Diagnosis Date    Chlamydia 02/2018    Hypertension     Multiple thyroid nodules     Palpitations     POTS (postural orthostatic tachycardia syndrome)     Thyroid nodule      Family History   Problem Relation Age of Onset    Kidney Disease Mother      MARCUS    Hypertension Father     Diabetes Father     Kidney Disease Maternal Grandmother     Hypertension Maternal Grandmother     Cancer Maternal Grandfather      sto,acj    Hypertension Maternal Grandfather     No Known Problems Paternal Grandmother     No Known Problems Paternal Grandfather     No Known Problems Sister     No Known Problems Brother     Thyroid Disease Cousin      x4    Thyroid Cancer Neg Hx      Social History     Social History    Marital status: SINGLE     Spouse name: N/A    Number of children: N/A    Years of education: N/A     Occupational History    Not on file.      Social History Main Topics    Smoking status: Never Smoker    Smokeless tobacco: Never Used    Alcohol use No    Drug use: No    Sexual activity: No     Other Topics Concern    Not on file     Social History Narrative    ** Merged History Encounter **          No Known Allergies      Current Outpatient Prescriptions   Medication Sig    topiramate (TOPAMAX) 25 mg tablet Take 1 Tab by mouth two (2) times a day.  gabapentin (NEURONTIN) 100 mg capsule Take 1 Cap by mouth nightly.  propranolol LA (INDERAL LA) 60 mg SR capsule Take 1 Cap by mouth two (2) times a day. Indications: POTS    biotin 1,000 mcg chew Take  by mouth.  meclizine (ANTIVERT) 12.5 mg tablet Take 1 Tab by mouth three (3) times daily as needed for Dizziness.  ibuprofen (MOTRIN) 200 mg tablet Take 2 Tabs by mouth every eight (8) hours as needed for Pain. No current facility-administered medications for this visit. Neurologic Exam     Mental Status        WD/WN adult in NAD, normal grooming  VSS  A&O x 3    PERRL, nonicteric  Face is symmetric, tongue midline  Speech is fluent and clear  No limb ataxia. No abnl movements. Moving all extemities spontaneously and symmetric  Normal gait    CVS RRR  Lungs nonlabored  Skin is warm and dry         Visit Vitals    /70    Resp 20    Ht 5' 3\" (1.6 m)    Wt 59.9 kg (132 lb)    LMP 02/17/2018    BMI 23.38 kg/m2       Assessment and Plan   Diagnoses and all orders for this visit:    1. Chronic migraine w/o aura w/o status migrainosus, not intractable    Other orders  -     topiramate (TOPAMAX) 25 mg tablet; Take 1 Tab by mouth two (2) times a day. -     gabapentin (NEURONTIN) 100 mg capsule; Take 1 Cap by mouth nightly. 42-year-old woman with chronic migraine headaches having good response to low-dose topiramate. No change to that medication. I did discuss with her that some of the known side effects as paresthesias. She is going to complete the sleep evaluation and then observe her symptoms. I am going to give her a trial of gabapentin at bedtime since the symptoms seem to affect her sleep. I would like to see her in 3 months.         [facility], [address]  Diplomate AGGIE

## 2018-03-09 NOTE — LETTER
3/9/2018 Patient:  Zoraida Sever YOB: 1980 Date of Visit: 3/9/2018 Dear Salud Layne MD 
[address] [place] [address] VIA Facsimile: [phone] 
 : I was requested by Salud Layne MD to evaluate Ms. Zoraida Sever  for Chief Complaint Patient presents with  Migraine  Numbness Negar Kaylee I am recommending the following:  
 
Diagnoses and all orders for this visit: 
 
1. Chronic migraine w/o aura w/o status migrainosus, not intractable Other orders -     topiramate (TOPAMAX) 25 mg tablet; Take 1 Tab by mouth two (2) times a day. -     gabapentin (NEURONTIN) 100 mg capsule; Take 1 Cap by mouth nightly. 
 
 
 
---------------------------------------------------------------------------------------------------------------------- Below is my encounter: Chief Complaint Patient presents with  Migraine  Numbness HPI Krishan Nunez is a 13-year-old woman with pots and chronic migraine here to follow-up. Last visit we initiated topiramate and she has had very good control at low dose medication. No severe migraines. She is complaining of some unusual paresthesias throughout her body affecting hands and scalp which preexisted topiramate initiation. She is going to be seeing a sleep specialist soon. Pots is being managed by cardiology with some medication changes ongoing.  [id number] utilized. Review of Systems Neurological: Positive for tingling and sensory change. All other systems reviewed and are negative. Past Medical History:  
Diagnosis Date  Chlamydia 02/2018  Hypertension  Multiple thyroid nodules  Palpitations  POTS (postural orthostatic tachycardia syndrome)  Thyroid nodule Family History Problem Relation Age of Onset  Kidney Disease Mother MARCUS  Hypertension Father  Diabetes Father  Kidney Disease Maternal Grandmother  Hypertension Maternal Grandmother  Cancer Maternal Grandfather   
  sto,acj  Hypertension Maternal Grandfather  No Known Problems Paternal Grandmother  No Known Problems Paternal Grandfather  No Known Problems Sister  No Known Problems Brother  Thyroid Disease Cousin x4  Thyroid Cancer Neg Hx Social History Social History  Marital status: SINGLE Spouse name: N/A  
 Number of children: N/A  
 Years of education: N/A Occupational History  Not on file. Social History Main Topics  Smoking status: Never Smoker  Smokeless tobacco: Never Used  Alcohol use No  
 Drug use: No  
 Sexual activity: No  
 
Other Topics Concern  Not on file Social History Narrative ** Merged History Encounter ** No Known Allergies Current Outpatient Prescriptions Medication Sig  topiramate (TOPAMAX) 25 mg tablet Take 1 Tab by mouth two (2) times a day.  gabapentin (NEURONTIN) 100 mg capsule Take 1 Cap by mouth nightly.  propranolol LA (INDERAL LA) 60 mg SR capsule Take 1 Cap by mouth two (2) times a day. Indications: POTS  
 biotin 1,000 mcg chew Take  by mouth.  meclizine (ANTIVERT) 12.5 mg tablet Take 1 Tab by mouth three (3) times daily as needed for Dizziness.  ibuprofen (MOTRIN) 200 mg tablet Take 2 Tabs by mouth every eight (8) hours as needed for Pain. No current facility-administered medications for this visit. Neurologic Exam  
 
Mental Status WD/WN adult in NAD, normal grooming VSS 
A&O x 3 PERRL, nonicteric Face is symmetric, tongue midline Speech is fluent and clear No limb ataxia. No abnl movements. Moving all extemities spontaneously and symmetric Normal gait CVS RRR Lungs nonlabored Skin is warm and dry Visit Vitals  /70  Resp 20  
 Ht 5' 3\" (1.6 m)  Wt 59.9 kg (132 lb)  LMP 02/17/2018  BMI 23.38 kg/m2 Assessment and Plan Diagnoses and all orders for this visit: 
 
1. Chronic migraine w/o aura w/o status migrainosus, not intractable Other orders -     topiramate (TOPAMAX) 25 mg tablet; Take 1 Tab by mouth two (2) times a day. -     gabapentin (NEURONTIN) 100 mg capsule; Take 1 Cap by mouth nightly. 41-year-old woman with chronic migraine headaches having good response to low-dose topiramate. No change to that medication. I did discuss with her that some of the known side effects as paresthesias. She is going to complete the sleep evaluation and then observe her symptoms. I am going to give her a trial of gabapentin at bedtime since the symptoms seem to affect her sleep. I would like to see her in 3 months. Thank you for giving me the opportunity to assist in the care of Ms. William Perkins. If you have questions, please do not hesitate to contact me. Sincerely, [facility], DO Neurologist 
Diplomate AGGIE

## 2018-03-09 NOTE — PATIENT INSTRUCTIONS
[facility]   Statement to Patients  April 1, 2014      In an effort to ensure the large volume of patient prescription refills is processed in the most efficient and expeditious manner, we are asking our patients to assist us by calling your Pharmacy for all prescription refills, this will include also your  Mail Order Pharmacy. The pharmacy will contact our office electronically to continue the refill process. Please do not wait until the last minute to call your pharmacy. We need at least 48 hours (2days) to fill prescriptions. We also encourage you to call your pharmacy before going to  your prescription to make sure it is ready. With regard to controlled substance prescription refill requests (narcotic refills) that need to be picked up at our office, we ask your cooperation by providing us with at least 72 hours (3days) notice that you will need a refill. We will not refill narcotic prescription refill requests after 4:00pm on any weekday, Monday through Thursday, or after 2:00pm on Fridays, or on the weekends. We encourage everyone to explore another way of getting your prescription refill request processed using Munchkin, our patient web portal through our electronic medical record system. Munchkin is an efficient and effective way to communicate your medication request directly to the office and  downloadable as an sara on your smart phone . Munchkin also features a review functionality that allows you to view your medication list as well as leave messages for your physician. Are you ready to get connected? If so please review the attatched instructions or speak to any of our staff to get you set up right away! Thank you so much for your cooperation. Should you have any questions please contact our Practice Administrator.     The Physicians and Staff,  [facility]

## 2018-03-09 NOTE — MR AVS SNAPSHOT
[place] [address] [address] [place] [address] 
[phone] Patient: Nguyễn Palm MRN: [id number] Bandar Strange Visit Information Irais Timmons Personal Médico Departamento Teléfono del Dep. Número de visita  
 3/9/2018 11:40 AM Yvonelida Hare DO [facility] at [place] [id number] Follow-up Instructions Return in about 3 months (around 6/9/2018). Your Appointments 5/10/2018 11:20 AM  
ESTABLISHED PATIENT with Izell Cowden, MD  
[facility] ([facility]) Appt Note: 6 mnth f/u  
 [address] [facility] [address]  
[address] [facility] [address]  
  
    
 6/11/2018  3:50 PM  
Follow Up with Rivera Deluca MD  
[facility]) Appt Note: Follow up thyroid; Follow up thyroid; Follow up thyroid Follow up thyroid; f/u Thyroid [facility] [employer] [phone] [id number] [address] [place] [address] Upcoming Health Maintenance Date Due DTaP/Tdap/Td series (1 - Tdap) 8/8/2000 Influenza Age 5 to Adult 8/1/2017 PAP AKA CERVICAL CYTOLOGY 2/2/2021 Alergias  Review Complete El: 3/8/2018 Por: MD Bonnie Vega del:  3/9/2018 No Known Allergies Vacunas actuales Revisadas el:  2/2/2018 No hay ninguna vacuna archivada. No revisadas esta visita You Were Diagnosed With   
  
 [place] Out[place] Chronic migraine w/o aura w/o status migrainosus, not intractable    -  Primary ICD-10-CM: C81.949 ICD-9-CM: 346.70 Partes vitales PS Resp [place] ( percentil de crecimiento) Peso (percentil de crecimiento) LMP (última vishal) BMI (IM)  
 140/70 20 5' 3\" (1.6 m) 132 lb (59.9 kg) 02/17/2018 23.38 kg/m2 Estado obstétrico Estatus de tabaquísmo Having regular periods Never Smoker BMI and BSA Data Body Mass Index Body Surface Area  
 23.38 kg/m 2 1.63 m 2 Teresita Cristina Pharmacy Name Phone [address]  [place] [phone] Aggarwal lista de medicamentos actualizada Chrissy Albion actualizada 3/9/18 11:49 AM.  Aruna Jacobson use aggarwal lista de medicamentos más reciente. biotin 1,000 mcg Chew Take  by mouth.  
  
 gabapentin 100 mg capsule También conocido kyle:  NEURONTIN Take 1 Cap by mouth nightly. ibuprofen 200 mg tablet También conocido kyle:  MOTRIN Take 2 Tabs by mouth every eight (8) hours as needed for Pain. meclizine 12.5 mg tablet También conocido kyle:  ANTIVERT Take 1 Tab by mouth three (3) times daily as needed for Dizziness. propranolol LA 60 mg SR capsule También conocido kyle:  INDERAL LA Take 1 Cap by mouth two (2) times a day. Indications: POTS  
  
 topiramate 25 mg tablet También conocido kyle:  TOPAMAX Take 1 Tab by mouth two (2) times a day. Recetas Enviado a la [place] Refills  
 topiramate (TOPAMAX) 25 mg tablet 3 Sig: Take 1 Tab by mouth two (2) times a day. Class: Normal  
 Pharmacy: [facility] [employer] [address] Ph #: [phone] Route: Oral  
 gabapentin (NEURONTIN) 100 mg capsule 3 Sig: Take 1 Cap by mouth nightly. Class: Normal  
 Pharmacy: [facility] [address] Ph #: [phone] Route: Oral  
  
Instrucciones de seguimiento Return in about 3 months (around 6/9/2018). Por hacer 04/11/2018 8:30 PM  
  Appointment with BEDROOM 8 [facility]; BEDROOM 7 [facility] at [facility] ([phone]) 1. Do not take a nap the day of the study  2. No caffeine after 12 noon the day of the study  3. Bring a 2 piece sleeping garment  4. Arrive 15 minutes prior to the appointment time. Ring buzzer to get into the building. 5. Hair should be clean and dry, no oils, sprays, powders and remove wigs, weaves or other hair accessories  6. Patient should eat dinner prior to arriving for the test and a light breakfast will be provided upon discharge in the morning  7. Patient may bring books, magazines, etc, and any toiletry items needed for the next morning  8. Bring all medications with you to the center  9. For specific questions please contact the sleep center directly, 830a to 5p  10. Patient's guardian/caregiver must remain in room during testing and provide personal needs for patient. Instrucciones para el Paciente PRESCRIPTION REFILL POLICY [facility] Statement to Patients April 1, 2014 In an effort to ensure the large volume of patient prescription refills is processed in the most efficient and expeditious manner, we are asking our patients to assist us by calling your Pharmacy for all prescription refills, this will include also your  Mail Order Pharmacy. The pharmacy will contact our office electronically to continue the refill process. Please do not wait until the last minute to call your pharmacy. We need at least 48 hours (2days) to fill prescriptions. We also encourage you to call your pharmacy before going to  your prescription to make sure it is ready. With regard to controlled substance prescription refill requests (narcotic refills) that need to be picked up at our office, we ask your cooperation by providing us with at least 72 hours (3days) notice that you will need a refill. We will not refill narcotic prescription refill requests after 4:00pm on any weekday, Monday through Thursday, or after 2:00pm on Fridays, or on the weekends. We encourage everyone to explore another way of getting your prescription refill request processed using Divesquare, our patient web portal through our electronic medical record system. Divesquare is an efficient and effective way to communicate your medication request directly to the office and  downloadable as an sara on your smart phone . Divesquare also features a review functionality that allows you to view your medication list as well as leave messages for your physician. Are you ready to get connected? If so please review the attatched instructions or speak to any of our staff to get you set up right away! Thank you so much for your cooperation. Should you have any questions please contact our Practice Administrator. The Physicians and Staff,  [facility] Introducing [facility] SERVICES! [facility] introduce portal paciente Aniikat . Ahora se puede acceder a partes de glass expediente médico, enviar por correo electrónico la oficina de glass médico y solicitar renovaciones de medicamentos en línea. En glass navegador de Internet , Hermilo Contreras a https://[employer]. [employer]ior[employer] Shyanne clic en el usuario por Miri Balderas? Cathryne Brandyn clic aquí en la sesión Elisabeth Sutherland. Verá la página de registro [place]. Ingrese glass código de [employer] diamond y kyle aparece a continuación. Usted no tendrá que [employer] código después de jackie completado el proceso de registro . Si usted no se inscribe antes de la fecha de caducidad , debe solicitar un nuevo código. · MyChart Código de acceso : [id number] Expires: 5/2/2018 12:29 PM 
 
Ingresa los últimos cuatro dígitos de glass Número de Seguro Social ( xxxx ) y fecha de nacimiento ( dd / mm / aaaa ) kyle se indica y shyanne clic en Enviar. Usted será llevado a la siguiente página de registro . Crear un ID Pj . Esta será glass ID de inicio de sesión de MyChart y no puede ser [place] , por lo que pensar en buck que es Christian Brown y fácil de recordar . Crear buck contraseña MyChart . Usted puede cambiar glass contraseña en cualquier momento . Ingrese glass Password Reset de preguntas y [place] . Adeline se puede utilizar en un momento posterior si usted olvida glass contraseña. Introduzca glass dirección de correo electrónico . Mony Chester recibirá buck notificación por correo electrónico cuando la nueva información está disponible en MyChart . Tina Miller clic en Registrarse. Linda Garre marija y descargar porciones de glass expediente médico. 
Alisson clic en el enlace de descarga del menú Resumen para descargar buck copia portátil de glass información médica . Si tiene [employer] , por favor visite la sección de preguntas frecuentes del sitio web MyChart . Recuerde, MyChart NO es que se utilizará para las necesidades urgentes. Para emergencias médicas , llame al 911 . Ahora disponible en glass iPhone y Android ! Por favor proporcione omari resumen de la documentación de cuidado a glass próximo proveedor. Your primary care clinician is listed as Sapphire Alexandra. If you have any questions after today's visit, please call [phone].

## 2018-03-28 ENCOUNTER — TELEPHONE (OUTPATIENT)
Dept: INTERNAL MEDICINE CLINIC | Age: 38
End: 2018-03-28

## 2018-03-28 NOTE — LETTER
3/28/2018 5:31 PM 
 
Ms. Favian Lopez 65 [address] [facility] :  1980 Please see attached lab results. Electrolytes are normal. 
 
 
Please let me know if you have other questions.  
 
Trevin Mckinney MD

## 2018-03-28 NOTE — TELEPHONE ENCOUNTER
Please let pt know her electrolytes were normal.    Please review with  with call. She should follow-up with cardiology as reviewed at her last visit.

## 2018-04-19 ENCOUNTER — OFFICE VISIT (OUTPATIENT)
Dept: CARDIOLOGY CLINIC | Age: 38
End: 2018-04-19

## 2018-04-19 VITALS
SYSTOLIC BLOOD PRESSURE: 118 MMHG | HEIGHT: 63 IN | WEIGHT: 135.8 LBS | TEMPERATURE: 98.2 F | BODY MASS INDEX: 24.06 KG/M2 | HEART RATE: 80 BPM | DIASTOLIC BLOOD PRESSURE: 82 MMHG

## 2018-04-19 DIAGNOSIS — E05.90 SUBCLINICAL HYPERTHYROIDISM: ICD-10-CM

## 2018-04-19 DIAGNOSIS — R00.2 HEART PALPITATIONS: ICD-10-CM

## 2018-04-19 DIAGNOSIS — G90.A POTS (POSTURAL ORTHOSTATIC TACHYCARDIA SYNDROME): Primary | ICD-10-CM

## 2018-04-19 DIAGNOSIS — R07.9 CHEST PAIN, UNSPECIFIED TYPE: ICD-10-CM

## 2018-04-19 DIAGNOSIS — R42 DIZZINESS: ICD-10-CM

## 2018-04-19 RX ORDER — PROPRANOLOL HYDROCHLORIDE 60 MG/1
60 CAPSULE, EXTENDED RELEASE ORAL 2 TIMES DAILY
Qty: 180 CAP | Refills: 1 | Status: SHIPPED | OUTPATIENT
Start: 2018-04-19 | End: 2019-12-22

## 2018-04-19 RX ORDER — FLUDROCORTISONE ACETATE 0.1 MG/1
0.1 TABLET ORAL 2 TIMES DAILY
Qty: 180 TAB | Refills: 1 | Status: SHIPPED | OUTPATIENT
Start: 2018-04-19 | End: 2018-07-02

## 2018-04-19 NOTE — PATIENT INSTRUCTIONS
Start taking florinef 0.1 mg twice a day    Scheduled stress echocardiogram. Hold propanolol for 24 hours prior to procedure. Follow up with  in 6 month    Refer to Dr. Rosemary Henderson [phone] at [facility].     Every morning, drink fluids,  including gatorade, and eat breakfast.

## 2018-04-19 NOTE — PROGRESS NOTES
Chief Complaint   Patient presents with    Dizziness     Pt stated symptoms worse then last time she was seen.  Palpitations    Follow-up     Pt stated that her dizziness has gotten worse and that she is dizzy all the time.

## 2018-04-19 NOTE — MR AVS SNAPSHOT
[address] [facility] [address] 
[phone] Patient: Rosey Barbosa MRN: [id number] Sigmund Terrazas Visit Information Silvana Padgett Personal Médico Departamento Teléfono del Dep. Número de visita 4/19/2018  8:20 AM Jose Dotson MD [facility] [place] [facility] [phone] Your Appointments 5/10/2018 11:20 AM  
ESTABLISHED PATIENT with Isabelle Palumbo MD  
[facility] ([address]) Appt Note: 6 mnth f/u  
 [address] [facility] [address]  
[address] [address]  
  
    
 6/8/2018 10:40 AM  
Follow Up with Chalo Patel DO [facility] at [address] [place] [address]) Appt Note: headache  
 302 [facility] [address]  
[facility] [address]  
  
    
 7/2/2018 11:10 AM  
Follow Up with Karen Ernst MD  
[facility] [address] [place] [address]) Appt Note: f/u Thyroid; r/s from 6/11/18/ follow up on thyroid; r/s from 3/30/18/  
 305 [facility] [address] [id number] [address] [place] [address] Upcoming Health Maintenance Date Due DTaP/Tdap/Td series (1 - Tdap) 8/8/2000 Influenza Age 5 to Adult 8/1/2017 PAP AKA CERVICAL CYTOLOGY 2/2/2021 Alergias  Review Complete El: 4/19/2018 Por: Ria Anne RN  
 A partir del:  4/19/2018 No Known Allergies Vacunas actuales Revisadas el:  2/2/2018 No hay ninguna vacuna archivada. No revisadas esta visita You Were Diagnosed With   
  
 [facility] Chest pain, unspecified type    -  Primary ICD-10-CM: R07.9 ICD-9-CM: 786.50 Dizziness     ICD-10-CM: X10 ICD-9-CM: 780.4 POTS (postural orthostatic tachycardia syndrome)     ICD-10-CM: R00.0, I95.1 ICD-9-CM: 427.89 Partes vitales PS Pulso Temperatura [place] ( percentil de crecimiento) Peso (percentil de crecimiento) BMI (IMC)  
 118/82 (BP 1 Location: Right arm, BP Patient Position: At rest) 80 98.2 °F (36.8 °C) (Oral) 5' 3\" (1.6 m) 135 lb 12.8 oz (61.6 kg) 24.06 kg/m2 Estado obstétrico Estatus de tabaquísmo Having regular periods Never Smoker Historial de signos vitales BMI and BSA Data Body Mass Index Body Surface Area 24.06 kg/m 2 1.65 m 2 [facility] Name Phone [address]  [employer] [phone] Aggarwal lista de medicamentos actualizada Burke Richardson actualizada 4/19/18  9:32 AM.  Kelly Roblesdu use aggarwal lista de medicamentos más reciente. biotin 1,000 mcg Chew Take  by mouth. fludrocortisone 0.1 mg tablet También conocido kyle:  FLORINEF Take 1 Tab by mouth two (2) times a day. propranolol LA 60 mg SR capsule También conocido kyle:  INDERAL LA Take 1 Cap by mouth two (2) times a day. Indications: POTS Recetas Enviado a la [place] Refills  
 fludrocortisone (FLORINEF) 0.1 mg tablet 1 Sig: Take 1 Tab by mouth two (2) times a day. Class: Normal  
 Pharmacy: [facility] [address] Ph #: [phone] Route: Oral  
 propranolol LA (INDERAL LA) 60 mg SR capsule 1 Sig: Take 1 Cap by mouth two (2) times a day. Indications: POTS Class: Normal  
 Pharmacy: [facility] [address] Ph #: [phone] Route: Oral  
  
Hicimos lo siguiente ECHO TTE STRESS EXRCSE COMP W OR WO CONTR [18958 Custom] REFERRAL TO NEPHROLOGY [WUZ00 Custom] Comentarios: Please evaluate patient for POTS Por hacer 05/22/2018 8:30 PM  
  Appointment with BEDROOM 8 [facility]; BEDROOM 7 [facility] at [facility] ([phone]) 1. Do not take a nap the day of the study  2. No caffeine after 12 noon the day of the study  3. Bring a 2 piece sleeping garment  4. Arrive 15 minutes prior to the appointment time. Ring buzzer to get into the building. 5. Hair should be clean and dry, no oils, sprays, powders and remove wigs, weaves or other hair accessories  6. Patient should eat dinner prior to arriving for the test and a light breakfast will be provided upon discharge in the morning  7. Patient may bring books, magazines, etc, and any toiletry items needed for the next morning  8. Bring all medications with you to the center  9. For specific questions please contact the sleep center directly, 830a to 5p  10. Patient's guardian/caregiver must remain in room during testing and provide personal needs for patient. Informacion de [employer] de Referencia Referido por Referido a  
  
 [id number] [facility], [address], [facility] Phone: [phone] Fax: [phone] Visitas Estado Florrie Varun de inicio Florrie Varun final  
 1 New Request 4/19/18 4/19/19 Si glass referencia tiene un estado de \"pending review\" o \"denied\" , informacion adicional sera enviada para apoyar el resultado de esta decision. Instrucciones para el Paciente Start taking florinef 0.1 mg twice a day Scheduled stress echocardiogram. Hold propanolol for 24 hours prior to procedure. Follow up with  in 6 month Refer to Dr. Saeid Herrera [phone] at [facility]. Every morning, drink fluids,  including gatorade, and eat breakfast.   
 
 
  
Introducing \[facility] [place] [facility]\"" & HEALTH SERVICES! [facility] introduce portal paciente MyChart .  Ahora se puede acceder a partes de glass expediente médico, enviar por correo electrónico la oficina de glass médico y solicitar renovaciones de medicamentos en línea. En glass navegador de Internet , Brielle Remak a [email or web address] Shyanne clic en el usuario por Magdalena Rosas? [facility] clic aquí en la sesión Nubia Sweet. Verá la página de registro [place]. Ingrese glass código de [employer] y kyle aparece a continuación. Usted no tendrá que [employer] código después de jackie completado el proceso de registro . Si usted no se inscribe antes de la fecha de caducidad , debe solicitar un nuevo código. · MyChart Código de acceso : [id number] Expires: 5/2/2018  1:29 PM 
 
Ingresa los últimos cuatro dígitos de glass Número de Seguro Social ( xxxx ) y fecha de nacimiento ( dd / mm / aaaa ) kyle se indica y shyanne clic en Enviar. Usted será llevado a la siguiente página de registro . Crear un ID MyChart . Esta será glass ID de inicio de sesión de MyChart y no puede ser [place] , por lo que pensar en buck que es Dicky Walter y fácil de recordar . Crear buck contraseña MyChart . Usted puede cambiar glass contraseña en cualquier momento . Ingrese glass Password Reset de preguntas y [place] . [place] se puede utilizar en un momento posterior si usted olvida glass contraseña. Introduzca glass dirección de correo electrónico . [place] Her recibirá buck notificación por correo electrónico cuando la nueva información está disponible en MyChart . Marybel Barrios clic en Registrarse. Cherylene Founds marija y descargar porciones de glass expediente médico. 
Shyanne clic en el enlace de descarga del menú Resumen para descargar buck copia portátil de glass información médica . Si tiene [employer] , por favor visite la sección de preguntas frecuentes del sitio web MyChart . Recuerde, MyChart NO es que se utilizará para las necesidades urgentes. Para emergencias médicas , llame al 911 . Ahora disponible en glass iPhone y Android ! Por favor proporcione omari resumen de la documentación de cuidado a glass próximo proveedor. Your primary care clinician is listed as Henny Almonte. If you have any questions after today's visit, please call [phone].

## 2018-04-19 NOTE — PROGRESS NOTES
Chantelle Mora     1980       Mattie Gonzalez MD, [facility]  Date of Visit-4/19/2018   PCP is Dusty Atkinson MD   [facility] [place]  [facility]  HPI:  Chantelle Mora is a 44 y.o. female   Pt seen for orthostasis, has seen Dr. Anjana Bravo and had a tilt table test. Symptomatic, unable to tolerate much florinef or midodrine. Is now on propanolol. Neurology had her on topamax for migraines, she is no longer taking this. Her orthostasis remains difficult to control. Interpretor #[id number]     The pt states that her symptoms have been getting worse. She reports that she is mostly experiencing worsening in her dizziness and this sensation of presyncope. The pt reports that her symptoms of dizziness occur mostly at night and occur when she is laying down or even sitting. She states that she feels like she is going to pass out in the mornings. The pt denies actually having syncopal episodes and states that it feels like her blood pressure is dropping. She reports that upon standing her vision is blurry and her head is pounding. The pt reports that she is not sleeping at night because she thinks that she stops breathing and feels short of breath. She states that she drinks a glass of water in the morning when she wakes up but is not eating breakfast because she feels nauseated. The pt denies drinking any Gatorade in the morning or eating anything salty. She reports that she had some mid sternal chest pain 3 weeks ago, this now occurs once a week and radiates to her back. She states that her chest pain lasts for hours. Denies edema, syncope, has no tachycardia, palpitations or sense of arrhythmia. Assessment/Plan:     1. POTS- orthostasis with near syncope but a wide variety of other complaints including tachycardia, palpitations, dizziness, headaches. She is orthostatic by blood pressure but not by pulse.  The pulse change may be because she is on a BB. She has chest pain that is atypical but has been a recurrent complaint. She has had an abnormal tilt table test. She did well with the inderal for a while but now seems to have limited benefit. She is not taking in fluids like Gatorade in the morning, nor eating breakfast very early in the morning and she is not wearing the stockings like Dr. Ashley Watson suggested. 2. Noncompliance with suggested non medical therapies as above. Strongly urged today to increase fluid intake and salty fluids in the mornings especially since she is more symptomatic in the mornings. 3. Chest pain atypical but recurrent, previous normal echo, will plan stress echo given that the symptoms keep coming up. Her echo in September was normal.     4. Her potassium has normalized, I don't think this potassium was particularly diagnostic as it was one value in the ED and in periods of stress hypokalemia can happen. 5. I plan to have her increase Gatorade use in the morning and breakfast, salty foods, get a stress echo and start back on florinef 0.1 mg BID. 6. Dr. Miguelangel Craig has expressed about sending her to see Dr. Gris Bailey at [facility], I have told Ms. Carlos Cross that I cannot explain all her symptoms and suspect that she needs a second opinion. Will talk with Dr. Miguelangel Craig about getting her to be seen at [facility]. 7. Migraines, no longer taking Topamax, noncompliant. 8. Hypothyroidism, seeing Dr. Kristopher Romero for sub clinical hyperthyroidism and goiter. apparently TFTs were normal in August 2017, I see a TSH just barely low at 0.433.     9. Follow up in 6 months.    Future Appointments  Date Time Provider Danielle Anderson   5/2/2018 11:00 AM ECHOTWO, [address]   5/2/2018 11:00 AM STRESSECHO, [address]   5/10/2018 11:20 AM Dee Harrison  [address]   5/22/2018 8:30 PM BEDROOM 7 [facility] [place]   6/8/2018 10:40 AM DO SENA Xie REX SCHED   7/2/2018 11:10 AM Elle Jaimes MD Jose RDE KENTRELL [facility]   10/18/2018 9:20 AM Kathia Arambula  [address]      Patient Instructions   Start taking florinef 0.1 mg twice a day    Scheduled stress echocardiogram. Hold propanolol for 24 hours prior to procedure. Follow up with  in 6 month    Refer to Dr. Tierney Caballero [phone] at [facility]. Every morning, drink fluids,  including gatorade, and eat breakfast.       Key CAD CHF Meds             propranolol LA (INDERAL LA) 60 mg SR capsule  (Taking) Take 1 Cap by mouth two (2) times a day. Indications: POTS            Impression:   1. POTS (postural orthostatic tachycardia syndrome)    2. Chest pain, unspecified type    3. Dizziness    4. Heart palpitations    5. Subclinical hyperthyroidism       Cardiac History:   ECHO 9-13-17 WNL   HOLTER 9-13-17 rare sinus arrhythmia ; 26 PACs, rare PVCs; sinus at  no prolonged tachycardia      ROS-except as noted above. . A complete cardiac and respiratory are reviewed and negative except as above ; Resp-denies wheezing  or productive cough,. Const- No unusual weight loss or fever; Neuro-no recent seizure or CVA ; GI- No BRBPR, abdom pain, bloating ; - no  hematuria   see supplement sheet, initialed and to be scanned by staff  Past Medical History:   Diagnosis Date    Chlamydia 02/2018    Hypertension     Multiple thyroid nodules     Palpitations     POTS (postural orthostatic tachycardia syndrome)     Thyroid nodule       Social Hx= reports that she has never smoked. She has never used smokeless tobacco. She reports that she does not drink alcohol or use illicit drugs. Exam and Labs:  /82 (BP 1 Location: Right arm, BP Patient Position: At rest)  Pulse 80  Temp 98.2 °F (36.8 °C) (Oral)   Ht 5' 3\" (1.6 m)  Wt 135 lb 12.8 oz (61.6 kg)  BMI 24.06 kg/b4Oqjyatdrajyjvv:  NAD, comfortable  Head: NC,AT. Eyes: No scleral icterus. Neck:  Neck supple. No JVD present. Throat: moist mucous membranes.   Chest: Effort normal & normal respiratory excursion . Neurological: alert, conversant and oriented . Skin: Skin is not cold. No obvious systemic rash noted. Not diaphoretic. No erythema. Psychiatric:  Grossly normal mood and affect. Behavior appears normal. Extremities:  no clubbing or cyanosis. Abdomen: non distended    Lungs:breath sounds normal. No stridor. distress, wheezes or  Rales. Heart: normal rate, regular rhythm, normal S1, S2, no murmurs, rubs, clicks or gallops , PMI non displaced. Edema: Edema is none. Lab Results   Component Value Date/Time    Sodium 140 03/08/2018 03:02 PM    Potassium 4.6 03/08/2018 03:02 PM    Chloride 100 03/08/2018 03:02 PM    CO2 24 03/08/2018 03:02 PM    Anion gap 9 02/12/2018 01:45 PM    Glucose 92 03/08/2018 03:02 PM    BUN 20 03/08/2018 03:02 PM    Creatinine 0.61 03/08/2018 03:02 PM    BUN/Creatinine ratio 33 (H) 03/08/2018 03:02 PM    GFR est AA >60 02/12/2018 01:45 PM    GFR est non-AA >60 02/12/2018 01:45 PM    Calcium 10.1 03/08/2018 03:02 PM      Wt Readings from Last 3 Encounters:   04/19/18 135 lb 12.8 oz (61.6 kg)   03/09/18 132 lb (59.9 kg)   03/08/18 132 lb 2 oz (59.9 kg)      BP Readings from Last 3 Encounters:   04/19/18 118/82   03/09/18 140/70   03/08/18 123/75      Current Outpatient Prescriptions   Medication Sig    propranolol LA (INDERAL LA) 60 mg SR capsule Take 1 Cap by mouth two (2) times a day. Indications: POTS    biotin 1,000 mcg chew Take  by mouth.  topiramate (TOPAMAX) 25 mg tablet Take 1 Tab by mouth two (2) times a day.  gabapentin (NEURONTIN) 100 mg capsule Take 1 Cap by mouth nightly.  meclizine (ANTIVERT) 12.5 mg tablet Take 1 Tab by mouth three (3) times daily as needed for Dizziness.  ibuprofen (MOTRIN) 200 mg tablet Take 2 Tabs by mouth every eight (8) hours as needed for Pain. No current facility-administered medications for this visit. Impression see above.       Written by Kalpesh Alvarenga, as dictated by Fadia Myers Michelle Nance MD.

## 2018-06-08 ENCOUNTER — OFFICE VISIT (OUTPATIENT)
Dept: NEUROLOGY | Age: 38
End: 2018-06-08

## 2018-06-08 VITALS
SYSTOLIC BLOOD PRESSURE: 120 MMHG | BODY MASS INDEX: 24.45 KG/M2 | HEIGHT: 63 IN | OXYGEN SATURATION: 98 % | HEART RATE: 92 BPM | RESPIRATION RATE: 14 BRPM | WEIGHT: 138 LBS | DIASTOLIC BLOOD PRESSURE: 80 MMHG

## 2018-06-08 DIAGNOSIS — R42 DIZZINESS: Primary | ICD-10-CM

## 2018-06-08 DIAGNOSIS — G43.009 MIGRAINE WITHOUT AURA AND WITHOUT STATUS MIGRAINOSUS, NOT INTRACTABLE: ICD-10-CM

## 2018-06-08 NOTE — LETTER
6/8/2018 Patient:  Amy Lambert YOB: 1980 Date of Visit: 6/8/2018 Dear Ivonne Miranda MD 
[address] [place] [address] Deyvi [place] VIA Facsimile: [phone] 
 : I was requested by Ivonne Miranda MD to evaluate Ms. Amy Lambert  for Chief Complaint Patient presents with  
 Headache [place] I am recommending the following:  
 
Diagnoses and all orders for this visit: 1. Dizziness 2. Migraine without aura and without status migrainosus, not intractable 
 
 
 
---------------------------------------------------------------------------------------------------------------------- Below is my encounter: Chief Complaint Patient presents with  
 Headache HPI 
 
44-year-old woman here to follow-up on headaches and dizziness.  [id number] utilized. Since I last saw her she has been diagnosed with sleep apnea and uses a CPAP machine and she has had very good improvement in her symptoms. She cannot remember the last time she had a headache. She has minimal dizziness now. Overall doing well and is no longer taking Topamax. Occasionally she may use gabapentin to help sleep. Review of Systems Neurological: Positive for dizziness. Psychiatric/Behavioral: The patient has insomnia. All other systems reviewed and are negative. Past Medical History:  
Diagnosis Date  Chlamydia 02/2018  Hypertension  Multiple thyroid nodules  Palpitations  POTS (postural orthostatic tachycardia syndrome)  Thyroid nodule Family History Problem Relation Age of Onset  Kidney Disease Mother MARCUS  Hypertension Father  Diabetes Father  Kidney Disease Maternal Grandmother  Hypertension Maternal Grandmother  Cancer Maternal Grandfather   
  sto,acj  Hypertension Maternal Grandfather  No Known Problems Paternal Grandmother  No Known Problems Paternal Grandfather  No Known Problems Sister  No Known Problems Brother  Thyroid Disease Cousin x4  Thyroid Cancer Neg Hx Social History Social History  Marital status: SINGLE Spouse name: N/A  
 Number of children: N/A  
 Years of education: N/A Occupational History  Not on file. Social History Main Topics  Smoking status: Never Smoker  Smokeless tobacco: Never Used  Alcohol use No  
 Drug use: No  
 Sexual activity: No  
 
Other Topics Concern  Not on file Social History Narrative ** Merged History Encounter ** No Known Allergies Current Outpatient Prescriptions Medication Sig  propranolol LA (INDERAL LA) 60 mg SR capsule Take 1 Cap by mouth two (2) times a day. Indications: POTS  
 biotin 1,000 mcg chew Take  by mouth.  fludrocortisone (FLORINEF) 0.1 mg tablet Take 1 Tab by mouth two (2) times a day. No current facility-administered medications for this visit. Neurologic Exam  
 
Mental Status WD/WN adult in NAD, normal grooming VSS 
A&O x 3 PERRL, nonicteric Face is symmetric, tongue midline Speech is fluent and clear No limb ataxia. No abnl movements. Moving all extemities spontaneously and symmetric Normal gait CVS RRR Lungs nonlabored Skin is warm and dry Visit Vitals  /80  Pulse 92  Resp 14  
 Ht 5' 3\" (1.6 m)  Wt 62.6 kg (138 lb)  SpO2 98%  BMI 24.45 kg/m2 Assessment and Plan Diagnoses and all orders for this visit: 1. Dizziness 2. Migraine without aura and without status migrainosus, not intractable 18-year-old woman who is having very frequent headaches and dizziness previously now with nearly resolution since her sleep disorder has been addressed. She is feeling better. I would defer any medications at this time.   Piter care can continue prescribing gabapentin nightly if needed for sleep. No need to follow-up here unless headaches return. Thank you for giving me the opportunity to assist in the care of Ms. Cara Dillon. If you have questions, please do not hesitate to contact me. Sincerely, [facility] [facility], DO Neurologist 
Diplomate AGGIE

## 2018-06-08 NOTE — COMMUNICATION BODY
Chief Complaint   Patient presents with    Headache       HPI    70-year-old woman here to follow-up on headaches and dizziness.  [id number] utilized. Since I last saw her she has been diagnosed with sleep apnea and uses a CPAP machine and she has had very good improvement in her symptoms. She cannot remember the last time she had a headache. She has minimal dizziness now. Overall doing well and is no longer taking Topamax. Occasionally she may use gabapentin to help sleep. Review of Systems   Neurological: Positive for dizziness. Psychiatric/Behavioral: The patient has insomnia. All other systems reviewed and are negative. Past Medical History:   Diagnosis Date    Chlamydia 02/2018    Hypertension     Multiple thyroid nodules     Palpitations     POTS (postural orthostatic tachycardia syndrome)     Thyroid nodule      Family History   Problem Relation Age of Onset    Kidney Disease Mother      MARCUS    Hypertension Father     Diabetes Father     Kidney Disease Maternal Grandmother     Hypertension Maternal Grandmother     Cancer Maternal Grandfather      sto,acj    Hypertension Maternal Grandfather     No Known Problems Paternal Grandmother     No Known Problems Paternal Grandfather     No Known Problems Sister     No Known Problems Brother     Thyroid Disease Cousin      x4    Thyroid Cancer Neg Hx      Social History     Social History    Marital status: SINGLE     Spouse name: N/A    Number of children: N/A    Years of education: N/A     Occupational History    Not on file.      Social History Main Topics    Smoking status: Never Smoker    Smokeless tobacco: Never Used    Alcohol use No    Drug use: No    Sexual activity: No     Other Topics Concern    Not on file     Social History Narrative    ** Merged History Encounter **          No Known Allergies      Current Outpatient Prescriptions   Medication Sig    propranolol LA (INDERAL LA) 60 mg SR capsule Take 1 Cap by mouth two (2) times a day. Indications: POTS    biotin 1,000 mcg chew Take  by mouth.  fludrocortisone (FLORINEF) 0.1 mg tablet Take 1 Tab by mouth two (2) times a day. No current facility-administered medications for this visit. Neurologic Exam     Mental Status        WD/WN adult in NAD, normal grooming  VSS  A&O x 3    PERRL, nonicteric  Face is symmetric, tongue midline  Speech is fluent and clear  No limb ataxia. No abnl movements. Moving all extemities spontaneously and symmetric  Normal gait    CVS RRR  Lungs nonlabored  Skin is warm and dry         Visit Vitals    /80    Pulse 92    Resp 14    Ht 5' 3\" (1.6 m)    Wt 62.6 kg (138 lb)    SpO2 98%    BMI 24.45 kg/m2       Assessment and Plan   Diagnoses and all orders for this visit:    1. Dizziness    2. Migraine without aura and without status migrainosus, not intractable      71-year-old woman who is having very frequent headaches and dizziness previously now with nearly resolution since her sleep disorder has been addressed. She is feeling better. I would defer any medications at this time. Piter care can continue prescribing gabapentin nightly if needed for sleep. No need to follow-up here unless headaches return. I reviewed and decided to continue the current medications.       Sameera Coker, [address]  Diplomate ABPN

## 2018-06-08 NOTE — PROGRESS NOTES
Chief Complaint   Patient presents with    Headache       HPI    42-year-old woman here to follow-up on headaches and dizziness.  [id number] utilized. Since I last saw her she has been diagnosed with sleep apnea and uses a CPAP machine and she has had very good improvement in her symptoms. She cannot remember the last time she had a headache. She has minimal dizziness now. Overall doing well and is no longer taking Topamax. Occasionally she may use gabapentin to help sleep. Review of Systems   Neurological: Positive for dizziness. Psychiatric/Behavioral: The patient has insomnia. All other systems reviewed and are negative. Past Medical History:   Diagnosis Date    Chlamydia 02/2018    Hypertension     Multiple thyroid nodules     Palpitations     POTS (postural orthostatic tachycardia syndrome)     Thyroid nodule      Family History   Problem Relation Age of Onset    Kidney Disease Mother      MARCUS    Hypertension Father     Diabetes Father     Kidney Disease Maternal Grandmother     Hypertension Maternal Grandmother     Cancer Maternal Grandfather      sto,acj    Hypertension Maternal Grandfather     No Known Problems Paternal Grandmother     No Known Problems Paternal Grandfather     No Known Problems Sister     No Known Problems Brother     Thyroid Disease Cousin      x4    Thyroid Cancer Neg Hx      Social History     Social History    Marital status: SINGLE     Spouse name: N/A    Number of children: N/A    Years of education: N/A     Occupational History    Not on file.      Social History Main Topics    Smoking status: Never Smoker    Smokeless tobacco: Never Used    Alcohol use No    Drug use: No    Sexual activity: No     Other Topics Concern    Not on file     Social History Narrative    ** Merged History Encounter **          No Known Allergies      Current Outpatient Prescriptions   Medication Sig    propranolol LA (INDERAL LA) 60 mg SR capsule Take 1 Cap by mouth two (2) times a day. Indications: POTS    biotin 1,000 mcg chew Take  by mouth.  fludrocortisone (FLORINEF) 0.1 mg tablet Take 1 Tab by mouth two (2) times a day. No current facility-administered medications for this visit. Neurologic Exam     Mental Status        WD/WN adult in NAD, normal grooming  VSS  A&O x 3    PERRL, nonicteric  Face is symmetric, tongue midline  Speech is fluent and clear  No limb ataxia. No abnl movements. Moving all extemities spontaneously and symmetric  Normal gait    CVS RRR  Lungs nonlabored  Skin is warm and dry         Visit Vitals    /80    Pulse 92    Resp 14    Ht 5' 3\" (1.6 m)    Wt 62.6 kg (138 lb)    SpO2 98%    BMI 24.45 kg/m2       Assessment and Plan   Diagnoses and all orders for this visit:    1. Dizziness    2. Migraine without aura and without status migrainosus, not intractable      27-year-old woman who is having very frequent headaches and dizziness previously now with nearly resolution since her sleep disorder has been addressed. She is feeling better. I would defer any medications at this time. [facility]iter care can continue prescribing gabapentin nightly if needed for sleep. No need to follow-up here unless headaches return. I reviewed and decided to continue the current medications.       [address] [facility], [address]  Diplomate ABPN

## 2018-06-08 NOTE — MR AVS SNAPSHOT
[address] 
[phone] Patient: Renato Marcial MRN: [id number] [place]  Visit Information Zahraa Hamm Personal Médico Departamento Teléfono del Dep. Número de visita 6/8/2018 10:40 AM Delia Guevara, DO [facility] at [address] [phone] Follow-up Instructions Return if symptoms worsen or fail to improve. Your Appointments 6/11/2018  2:00 PM  
ECHO CARDIOGRAMS 2D with Clyde Shore [facility] ([facility] SCHEDULING) Appt Note: stress echo dx cp per Dr Venkatesh Santana. Benna Him Benna Him Pt r/s 5-2-18 appt. ..atb 5.8.18 pt rs todays appt; stress echo dx cp per Dr Venkatesh Santana. Benna Him Benna Him Pt r/s 5-2-18 appt. ..atb 5.8.18 pt rs todays appt; Pt r/s 5-17-18 appt. .atb; pt r/s 5-29-18 appt. ..atb  
 [address] [facility] 57  
[phone] [address] [place] [address]   
  
    
 6/11/2018  2:00 PM  
STRESS ECHOCARDIOGRAMS with Arnel Funes [facility] ([facility] SCHEDULING) Appt Note: pt r/s 5-29-18 appt. ..atb  
 [address] [facility] [address]  
[facility] 63 [address] [facility] [address]  
  
    
 6/25/2018  8:00 AM  
ROUTINE CARE with Jose Dewitt MD  
[facility] [address] [facility]) Appt Note: Follow-up on Potassium level [address] [facility] [address] [place] [address]  
[address] [facility] [address]  
  
    
 7/2/2018 11:10 AM  
Follow Up with Mario Santos MD  
[facility] [address] [facility]) Appt Note: f/u Thyroid; r/s from 6/11/18/ follow up on thyroid; r/s from 3/30/18/  
 [address] [facility] [address] [id number] [phone] [address]  
  
    
 10/18/2018  9:20 AM  
ESTABLISHED PATIENT with Ap Brown MD  
[facility] ([facility]) Appt Note: 6 month f/u  
 [address] [place] [address] Upcoming Health Maintenance Date Due DTaP/Tdap/Td series (1 - Tdap) 8/8/2000 Influenza Age 5 to Adult 8/1/2018 PAP AKA CERVICAL CYTOLOGY 2/2/2021 Alergias  Review Complete El: 6/8/2018 Por: Kayla Poster A partir del:  6/8/2018 No Known Allergies Vacunas actuales Revisadas el:  2/2/2018 No hay ninguna vacuna archivada. No revisadas esta visita You Were Diagnosed With   
  
 Brooke Orn Dizziness    -  Primary ICD-10-CM: J65 ICD-9-CM: 780.4 Migraine without aura and without status migrainosus, not intractable     ICD-10-CM: K04.622 ICD-9-CM: 346.10 Partes vitales PS Pulso Resp [place] ( percentil de crecimiento) Peso (percentil de crecimiento) SpO2  
 120/80 92 14 5' 3\" (1.6 m) 138 lb (62.6 kg) 98% BMI [facility]) Estado obstétrico Estatus de tabaquísmo 24.45 kg/m2 Having regular periods Never Smoker Historial de signos vitales BMI and BSA Data Body Mass Index Body Surface Area  
 24.45 kg/m 2 1.67 m 2 [facility] Name Phone [place] [address]  [place] [phone] Aggarwal lista de medicamentos actualizada Adore Ruiz actualizada 6/8/18 10:55 AM.  Mónica Velozch use aggarwal lista de medicamentos más reciente. biotin 1,000 mcg Chew Take  by mouth. fludrocortisone 0.1 mg tablet También conocido kyle:  FLORINEF Take 1 Tab by mouth two (2) times a day. propranolol LA 60 mg SR capsule También conocido kyle:  INDERAL LA  
 Take 1 Cap by mouth two (2) times a day. Indications: POTS Instrucciones de seguimiento Return if symptoms worsen or fail to improve. Instrucciones para el Paciente A Healthy Lifestyle: Care Instructions Your Care Instructions A healthy lifestyle can help you feel good, stay at a healthy weight, and have plenty of energy for both work and play. A healthy lifestyle is something you can share with your whole family. A healthy lifestyle also can lower your risk for serious health problems, such as high blood pressure, heart disease, and diabetes. You can follow a few steps listed below to improve your health and the health of your family. Follow-up care is a key part of your treatment and safety. Be sure to make and go to all appointments, and call your doctor if you are having problems. It's also a good idea to know your test results and keep a list of the medicines you take. How can you care for yourself at home? · Do not eat too much sugar, fat, or fast foods. You can still have dessert and treats now and then. The goal is moderation. · Start small to improve your eating habits. Pay attention to portion sizes, drink less juice and soda pop, and eat more fruits and vegetables. ¨ Eat a healthy amount of food. A 3-ounce serving of meat, for example, is about the size of a deck of cards. Fill the rest of your plate with vegetables and whole grains. ¨ Limit the amount of soda and sports drinks you have every day. Drink more water when you are thirsty. ¨ Eat at least 5 servings of fruits and vegetables every day. It may seem like a lot, but it is not hard to reach this goal. A serving or helping is 1 piece of fruit, 1 cup of vegetables, or 2 cups of leafy, raw vegetables. Have an apple or some carrot sticks as an afternoon snack instead of a candy bar. Try to have fruits and/or vegetables at every meal. 
· Make exercise part of your daily routine.  You may want to start with simple activities, such as walking, bicycling, or slow swimming. Try to be active 30 to 60 minutes every day. You do not need to do all 30 to 60 minutes all at once. For example, you can exercise 3 times a day for 10 or 20 minutes. Moderate exercise is safe for most people, but it is always a good idea to talk to your doctor before starting an exercise program. 
· Keep moving. Camjoyce Ramirez the lawn, work in the garden, or [employer]. Take the stairs instead of the elevator at work. · If you smoke, quit. People who smoke have an increased risk for heart attack, stroke, cancer, and other lung illnesses. Quitting is hard, but there are ways to boost your chance of quitting tobacco for good. ¨ Use nicotine gum, patches, or lozenges. ¨ Ask your doctor about stop-smoking programs and medicines. ¨ Keep trying. In addition to reducing your risk of diseases in the future, you will notice some benefits soon after you stop using tobacco. If you have shortness of breath or asthma symptoms, they will likely get better within a few weeks after you quit. · Limit how much alcohol you drink. Moderate amounts of alcohol (up to 2 drinks a day for men, 1 drink a day for women) are okay. But drinking too much can lead to liver problems, high blood pressure, and other health problems. Family health If you have a family, there are many things you can do together to improve your health. · Eat meals together as a family as often as possible. · Eat healthy foods. This includes fruits, vegetables, lean meats and dairy, and whole grains. · Include your family in your fitness plan. Most people think of activities such as jogging or tennis as the way to fitness, but there are many ways you and your family can be more active. Anything that makes you breathe hard and gets your heart pumping is exercise. Here are some tips: 
¨ Walk to do errands or to take your child to school or the bus. ¨ Go for a family bike ride after dinner instead of watching TV. Where can you learn more? Go to http://jareth-femi.info/. Enter Y287 in the search box to learn more about \"A Healthy Lifestyle: Care Instructions. \" Current as of: May 12, 2017 Content Version: 11.4 © 0587-0667 [employer]. Care instructions adapted under license by [employer] (which disclaims liability or warranty for this information). If you have questions about a medical condition or this instruction, always ask your healthcare professional. [address] any warranty or liability for your use of this information. Introducing [facility]! Rajendra Harrison introduce portal paciente MyChart . Ahora se puede acceder a partes de glass expediente médico, enviar por correo electrónico la oficina de glass médico y solicitar renovaciones de medicamentos en línea. En glass navegador de Internet , Juhi Walter a [email or web address] Shyanne clic en el usuario por Tobey Nissen? Alice Stanley clic aquí en la sesión Brit Lilly. Verá la página de registro [place]. Ingrese glass código de [employer] diamond y kyle aparece a continuación. Usted no tendrá que [employer] código después de jackie completado el proceso de registro . Si usted no se inscribe antes de la fecha de caducidad , debe solicitar un nuevo código. · MyChart Código de acceso : [id number] Expires: 8/8/2018  9:50 PM 
 
Ingresa los últimos cuatro dígitos de glass Número de Seguro Social ( xxxx ) y fecha de nacimiento ( dd / mm / aaaa ) kyle se indica y shyanne clic en Enviar. Usted será llevado a la siguiente página de registro . Crear un ID MyChart . Esta será glass ID de inicio de sesión de MyChart y no puede ser [place] , por lo que pensar en buck que es Rheta Pointer y fácil de recordar . Crear buck contraseña MyChart . Usted puede cambiar glass contraseña en cualquier momento . Ingrese glass Password Reset de preguntas y [place] . [place] se puede utilizar en un momento posterior si usted olvida glass contraseña. Introduzca glass dirección de correo electrónico . Sylvie Fabry recibirá buck notificación por correo electrónico cuando la nueva información está disponible en MyChart . Ivania Powell clic en Registrarse. Corena Solitario marija y descargar porciones de glass expediente médico. 
Alisson clic en el enlace de descarga del menú Resumen para descargar buck copia portátil de glass información médica . Si tiene [employer] , por favor visite la sección de preguntas frecuentes del sitio web MyChart . Recuerde, SoNetJobhart NO es que se utilizará para las necesidades urgentes. Para emergencias médicas , llame al 911 . Ahora disponible en glass iPhone y Android ! Por favor proporcione omari resumen de la documentación de cuidado a glass próximo proveedor. Your primary care clinician is listed as Luis Cha. If you have any questions after today's visit, please call [phone].

## 2018-06-11 ENCOUNTER — CLINICAL SUPPORT (OUTPATIENT)
Dept: CARDIOLOGY CLINIC | Age: 38
End: 2018-06-11

## 2018-06-11 ENCOUNTER — OFFICE VISIT (OUTPATIENT)
Dept: OBGYN CLINIC | Age: 38
End: 2018-06-11

## 2018-06-11 VITALS
WEIGHT: 138 LBS | BODY MASS INDEX: 24.45 KG/M2 | DIASTOLIC BLOOD PRESSURE: 80 MMHG | SYSTOLIC BLOOD PRESSURE: 130 MMHG | HEIGHT: 63 IN

## 2018-06-11 DIAGNOSIS — R07.9 CHEST PAIN, UNSPECIFIED TYPE: Primary | ICD-10-CM

## 2018-06-11 DIAGNOSIS — N91.2 AMENORRHEA: Primary | ICD-10-CM

## 2018-06-11 DIAGNOSIS — R00.2 HEART PALPITATIONS: ICD-10-CM

## 2018-06-11 DIAGNOSIS — R42 DIZZINESS: ICD-10-CM

## 2018-06-11 LAB
HCG URINE, QL. (POC): NEGATIVE
VALID INTERNAL CONTROL?: YES

## 2018-06-11 NOTE — PROGRESS NOTES
Confirmation of Pregnancy Visit    Madalyn Poon is a 44 y.o. No obstetric history on file. presenting for confirmation of pregnancy. She did not have a period last month. She is well without complaints today    Pregnancy symptoms:  -N/V?n  -breast tenderness? n  -fatigue?n  -cramping?n  -weight change? n  -Vaginal bleeding?n    LMP- unsure  STI-denies    Review of Systems - History obtained from the patient  Constitutional: negative for weight loss, fever, night sweats  HEENT: negative for hearing loss, earache, congestion, snoring, sorethroat  CV: negative for chest pain, palpitations, edema  Resp: negative for cough, shortness of breath, wheezing  GI: negative for change in bowel habits, abdominal pain, black or bloody stools  : negative for frequency, dysuria, hematuria, vaginal discharge  MSK: negative for back pain, joint pain, muscle pain  Breast: negative for breast lumps, nipple discharge, galactorrhea  Skin :negative for itching, rash, hives  Neuro: negative for dizziness, headache, confusion, weakness  Psych: negative for anxiety, depression, change in mood  Heme/lymph: negative for bleeding, bruising, pallor    Physical Exam    Visit Vitals    /80 (BP 1 Location: Left arm, BP Patient Position: Sitting)    Ht 5' 3\" (1.6 m)    Wt 138 lb (62.6 kg)    LMP  (LMP Unknown)    BMI 24.45 kg/m2       Constitutional  · Appearance: well-nourished, well developed, alert, in no acute distress    HENT  · Head and Face: appears normal    Neck  · Inspection/Palpation: normal appearance, no masses or tenderness  · Lymph Nodes: no lymphadenopathy present  · Thyroid: gland size normal, nontender, no nodules or masses present on palpation    Chest  · Respiratory Effort: non-labored breathing  · Auscultation: CTAB, normal breath sounds    Cardiovascular  · Heart:  · Auscultation: regular rate and rhythm without murmur  · Extremities: no peripheral edema    Gastrointestinal  · Abdominal Examination: abdomen non-tender to palpation, normal bowel sounds, no masses present  · Liver and spleen: no hepatomegaly present, spleen not palpable  · Hernias: no hernias identified    Skin  · General Inspection: no rash, no lesions identified    Neurologic/Psychiatric  · Mental Status:  · Orientation: grossly oriented to person, place and time  · Mood and Affect: mood normal, affect appropriate    UPT negative today    Assessment/Plan:  44 y.o. with missed menses and negative UPT in office, discussed, she desires serum bhcg today to rule out preg.       RTC: 1 month, or sooner prn for problems or concerns  Cramping, pain, bleeding precautions reviewed  Handouts and instructions provided    Elaina Darden MD  6/11/2018  3:06 PM

## 2018-06-12 LAB — HCG INTACT+B SERPL-ACNC: <1 MIU/ML

## 2018-06-13 ENCOUNTER — TELEPHONE (OUTPATIENT)
Dept: OBGYN CLINIC | Age: 38
End: 2018-06-13

## 2018-06-18 NOTE — PROGRESS NOTES
Got to 82% with no ischemia  Seems low probability for CAD  Are her symptoms better? Is she seeing [facility] Dr Guido Cisneros?   She missed her appt with Dr Bere Whitmore    6/25/2018  8:00 AM    Vicente David MD CP           [facility]  7/2/2018   11:10 AM   Christian Foley MD       [facility]  7/23/2018  8:30 PM    [address]ROOM [address]        SLEEP LAB [place]  10/18/2018 9:20 AM    Nitin Lyon MD        [place] [address]

## 2018-06-19 ENCOUNTER — TELEPHONE (OUTPATIENT)
Dept: CARDIOLOGY CLINIC | Age: 38
End: 2018-06-19

## 2018-06-19 NOTE — TELEPHONE ENCOUNTER
----- Message from Magy Lyons MD sent at 6/18/2018  1:37 PM EDT -----  Got to 82% with no ischemia  Seems low probability for CAD  Are her symptoms better? Is she seeing [facility] Dr Mariza Cash?   She missed her appt with Dr Sonia Galvan    6/25/2018  8:00 AM    Cornelio Conway MD   [facility]  7/2/2018   11:10 AM   Ana Alvarez MD       [facility]  7/23/2018  8:30 PM    [address]EDROOM [address]        SLEEP LAB [place]  10/18/2018 9:20 AM    Magy Lyons MD        [place] [address]

## 2018-06-21 NOTE — TELEPHONE ENCOUNTER
Patient came into office. Results given using  service. Patient still had lots of questions. Appointment was scheduled to discuss with Dr. Italo Keenan    Patient reported that she was not able to get into see Dr. Ori Minaya until May 2019    Reported that she missed Dr. Rebeka Gary appointment because she also had her neurologist appointment.

## 2018-07-02 ENCOUNTER — OFFICE VISIT (OUTPATIENT)
Dept: ENDOCRINOLOGY | Age: 38
End: 2018-07-02

## 2018-07-02 VITALS
RESPIRATION RATE: 18 BRPM | HEIGHT: 63 IN | OXYGEN SATURATION: 99 % | WEIGHT: 136.6 LBS | DIASTOLIC BLOOD PRESSURE: 67 MMHG | HEART RATE: 88 BPM | TEMPERATURE: 98.2 F | SYSTOLIC BLOOD PRESSURE: 127 MMHG | BODY MASS INDEX: 24.2 KG/M2

## 2018-07-02 DIAGNOSIS — E05.90 SUBCLINICAL HYPERTHYROIDISM: Primary | ICD-10-CM

## 2018-07-02 DIAGNOSIS — E04.2 MULTINODULAR GOITER: ICD-10-CM

## 2018-07-02 DIAGNOSIS — R61 HYPERHIDROSIS: ICD-10-CM

## 2018-07-02 DIAGNOSIS — N92.6 IRREGULAR MENSES: ICD-10-CM

## 2018-07-02 DIAGNOSIS — R42 DIZZINESS: ICD-10-CM

## 2018-07-02 NOTE — PROGRESS NOTES
Chief Complaint   Patient presents with    Thyroid Problem     follow up       1. Have you been to the ER, urgent care clinic since your last visit? POTS   Hospitalized since your last visit? No    2. Have you seen or consulted any other health care providers outside of the [address] [place] [address] since your last visit?    No

## 2018-07-02 NOTE — PROGRESS NOTES
Chief Complaint   Patient presents with    Thyroid Problem     follow up   Records since last visit reviewed. History of Present Illness: Hallie Bautista is a 44 y.o. female here for follow up of Subclinical hyperthyroidism and MNG. Pt notes that in 2015 in [facility] she was diagnosed with \"a thyroid issue\" she had a biopsy but was never treated for her \"thyroid problems\". She was having issues of fatigue, tired all the time, low mood, GI upset and hair loss. \"I had blood tests and was told I had a thyroid issue\". She had an ultrasound and biopsy, but there was no evidence of cancer. Pt was also sent for Thyroid US in April 2017 at [facility], the report shows a right thyroid nodule 9x5x8 mm and Left hypoechoic nodule measuring 1.9x1. 1x1.7 cm with internal septation. In May 2017 I sent her for FNA which came back benign. At our initial visit in May 2017 I sent pt for repeat FNA of dominate nodule in Left thyroid lobe and it was read as benign. I checked her TFTs and her TSH was 0.398 with FT4 of 1.18, TT3 129, there TRAb, TPO and TgAb were not elevated. Since her T3/T4 were normal and she had no other evidence of hyperthyroidism, her low TSH technically fits with subclinical hyperthyroidism, which it was felt did not meet criteria for anti-thyroid therapy. At our last visit in September 2017 her TSH was 0.433 with FT4 of 1.18 and TT3 of 111. She has reported hx of dizziness. She saw her PCP for dizziness who ordered an MRI/MRA which was normal.  She was referred to neurology and cardiology. She saw Dr. Bryon Torres of Cardiology, he checked an Echo, which was normal. She was started on Flrinef and Mitodrine, she was not able to tolerate either. She was referred to Dr. Jaci Elizondo for tilt table test (she did not keep the appointment) and [facility] Yanick Sampson [facility]   She saw Dr. Jean Carlos Rg of neurology and repeat MRI did not show evidence of MS and was normal.    She continues to have the issues of dizziness and she has been having symptoms of pre-syncope and nausea. She was referred for sleep study, she had another stress echo, which was normal. She was referred to Dr. Andrew Serrano for the hypokalemia and POTS. She denies issues of dysphagia, or chocking. She does note that she has occasional palpitations, if she stands up too quickly or if she is very active. She denies CP, SOB, tremors. She does note that on occasions she will wake in the morning with sweats and \"shaky\". Pt notes her menstrual cycle has been irregular. Her LMP was June 22nd and it was \"shorter than normal\". She has noted irregularity in her cycle for about 5-6 months ago. She has her Gyn and Melida Delgado did a pregnancy test and told me that if my symptoms persisted to see my endocrinologist\". We have previously evaluated her for pheochromocytoma. Her plasma metanephrine levels were not elevated. Pt only speaks Armenian, the history was taken with the help of [facility] #[id number]. Current Outpatient Prescriptions   Medication Sig    propranolol LA (INDERAL LA) 60 mg SR capsule Take 1 Cap by mouth two (2) times a day. Indications: POTS    biotin 1,000 mcg chew Take  by mouth. No current facility-administered medications for this visit. No Known Allergies  Review of Systems:  - Cardiovascular: no chest pain  - Neurological: no tremors  - Integumentary: skin is normal    Physical Examination:  Blood pressure 127/67, pulse 88, temperature 98.2 °F (36.8 °C), temperature source Oral, resp. rate 18, height 5' 3\" (1.6 m), weight 136 lb 9.6 oz (62 kg), last menstrual period 06/22/2018, SpO2 99 %.   - General: pleasant, no distress, good eye contact   - Neck: no thyromegaly or thyroid bruits  - Cardiovascular: regular, normal rate, nl s1 and s2, no m/r/g   - Integumentary: skin is normal, no edema  - Neurological: reflexes 2+ at biceps, no tremors  - Psychiatric: normal mood and affect    Data Reviewed:   - none new for review    Assessment/Plan:   1) Subclinical Hyperthyroidism > Pt is clinically euthyroid, will repeat her TFTs today. 2) Multinodular Goiter > The FNA of the dominate left thyroid nodule was benign. Will repeat the thyroid US. 3) Hyperhidrosis > Will repeat the plasma metanephrine levels, check a IGF-1, FSH, LH and Estrogen levels. Will also test pt's insulin, proinsulin and c-peptide levels and BMP to look for evidence of hypoglycemia as a possible cause of her hyperhidrosis. Pt voices understanding and agreement with the plan. RTC 12 months    Follow-up Disposition:  Return in about 1 year (around 7/2/2019).     Copy sent to:  Dr. Cain Elena

## 2018-09-04 ENCOUNTER — OFFICE VISIT (OUTPATIENT)
Dept: CARDIOLOGY CLINIC | Age: 38
End: 2018-09-04

## 2018-09-04 VITALS
HEIGHT: 63 IN | SYSTOLIC BLOOD PRESSURE: 128 MMHG | BODY MASS INDEX: 24.1 KG/M2 | DIASTOLIC BLOOD PRESSURE: 80 MMHG | RESPIRATION RATE: 18 BRPM | OXYGEN SATURATION: 99 % | HEART RATE: 85 BPM | WEIGHT: 136 LBS

## 2018-09-04 DIAGNOSIS — E05.90 SUBCLINICAL HYPERTHYROIDISM: ICD-10-CM

## 2018-09-04 DIAGNOSIS — R00.2 HEART PALPITATIONS: ICD-10-CM

## 2018-09-04 DIAGNOSIS — G90.A POTS (POSTURAL ORTHOSTATIC TACHYCARDIA SYNDROME): Primary | ICD-10-CM

## 2018-09-04 DIAGNOSIS — R42 DIZZINESS: ICD-10-CM

## 2018-09-04 NOTE — PROGRESS NOTES
Chief Complaint   Patient presents with    Results       Visit Vitals    /80 (BP 1 Location: Left arm, BP Patient Position: Sitting)    Pulse 85    Resp 18    Ht 5' 3\" (1.6 m)    Wt 136 lb (61.7 kg)    SpO2 99%    BMI 24.09 kg/m2       Two patient indentifiers verified. Patient medication verified. Patient pharmacy was verified.

## 2018-09-04 NOTE — MR AVS SNAPSHOT
[address] [facility] [address] [facility] 
[phone] Patient: Kimberly Teresa MRN: [id number] Page Puna Visit Information Oswaldo Perez y Hortencia Personal Médico Departamento Teléfono del Dep. Número de visita 9/4/2018  9:40 AM Zenaida Curiel MD [facility] [phone] Your Appointments 7/1/2019 10:50 AM  
Follow Up with MD Brookyln [facility]) Appt Note: 1 year f/u  
 [address] [facility] [address] [id number] [address] [facility] Upcoming Health Maintenance Date Due DTaP/Tdap/Td series (1 - Tdap) 8/8/2000 Influenza Age 5 to Adult 8/1/2018 PAP AKA CERVICAL CYTOLOGY 2/2/2021 Alergias  Review Complete El: 9/4/2018 Por: Zenaida Curiel MD  
 Gisel Eaton del:  9/4/2018 No Known Allergies Vacunas actuales Revisadas el:  2/2/2018 No hay ninguna vacuna archivada. No revisadas esta visita Partes vitales PS Pulso Resp [place] ( percentil de crecimiento) Peso (percentil de crecimiento) SpO2  
 128/80 (BP 1 Location: Left arm, BP Patient Position: Sitting) 85 18 5' 3\" (1.6 m) 136 lb (61.7 kg) 99% BMI [facility]) Estado obstétrico Estatus de tabaquísmo 24.09 kg/m2 Having regular periods Never Smoker Historial de signos vitales BMI and BSA Data Body Mass Index Body Surface Area 24.09 kg/m 2 1.66 m 2 [facility] Name Phone [address]  [place] [phone] Aggarwal lista de medicamentos actualizada Lista actualizada 9/4/18 10:26 AM.  Cm Allan use aggarwal lista de medicamentos más reciente. biotin 1,000 mcg Chew Take  by mouth. propranolol LA 60 mg SR capsule También conocido kyle:  INDERAL LA Take 1 Cap by mouth two (2) times a day. Indications: POTS Instrucciones para el Paciente Canceled follow up scheduled with Dr. Blanche Erwin in October. Follow up with Dr. Ced Breen in 6 months. Introducing [facility]! [facility] introduce portal paciente MyChart . Ahora se puede acceder a partes de glass expediente médico, enviar por correo electrónico la oficina de glass médico y solicitar renovaciones de medicamentos en línea. En glass navegador de Internet , Joel Noriega a [email or web address] Shyanne clic en el usuario por Lisa Lamb? Kenn de guzmanic aquí en la sesión Deborha Majors. Verá la página de registro [place]. Ingrese glass código de Bank  Maricruz diamond y kyle aparece a continuación. Usted no tendrá que [employer] código después de jackie completado el proceso de registro . Si usted no se inscribe antes de la fecha de caducidad , debe solicitar un nuevo código. · MyChart Código de acceso : [facility] Expires: 12/3/2018 10:26 AM 
 
Ingresa los últimos cuatro dígitos de glass Número de Seguro Social ( xxxx ) y fecha de nacimiento ( dd / mm / aaaa ) kyle se indica y shyanne clic en Enviar. Usted será llevado a la siguiente página de registro . Crear un ID MyChart . Esta será glass ID de inicio de sesión de MyChart y no puede ser [place] , por lo que pensar en buck que es [place] y fácil de recordar . Crear buck contraseña MyChart . Usted puede cambiar glass contraseña en cualquier momento . Ingrese glass Password Reset de preguntas y [place] . College se puede utilizar en un momento posterior si usted olvida glass contraseña. Introduzca glass dirección de correo electrónico . Tayla Loyola recibirá buck notificación por correo electrónico cuando la nueva información está disponible en MyChart . Beau Donal cici en Registrarse.  Patrick Woodsan mraija y descargar porciones de glass expediente médico. 
 Alisson fiordaliza en el enlace de descarga del menú Resumen para descargar buck copia portátil de glass información médica . Si tiene [employer] , por favor visite la sección de preguntas frecuentes del sitio web MyChart . Recuerde, MyChart NO es que se utilizará para las necesidades urgentes. Para emergencias médicas , llame al 911 . Ahora disponible en glass iPhone y Android ! Por favor proporcione omari resumen de la documentación de cuidado a glass próximo proveedor. Your primary care clinician is listed as Willian Vicente. If you have any questions after today's visit, please call [phone].

## 2018-09-04 NOTE — PROGRESS NOTES
Angie Reyes     1980       Mattie Brush MD, [facility]  Date of Visit-9/4/2018   PCP is Yoseph Gómez MD   [facility]  [facility]  nterpretor #[id number] Beckie Aschoff  HPI:  Angie Reyes is a 43 y.o. female   Pt seen for orthostasis, has seen Dr. Jaja Jean and had a tilt table test. Symptomatic, unable to tolerate much florinef or midodrine. Is now on propanolol. Neurology had her on topamax for migraines, she is no longer taking this. Her orthostasis remains difficult to control. She missed her follow up with Dr. Jaja Jean. She takes Florinef intermittently. She takes Propranolol mostly. She told some doctors that her sleep apnea makes things better, but she tells me she's not been wearing her sleep mask . She continues to have a wide variety of positive symptoms that seem out of proportion to her clinical condition. These include some chest pain, tachycardia, syncope, palpitations, pain/swelling in the feet, aching joints, dizziness and shortness of breath. The dizziness is probably more than the syncope. She's always feel tired, says it's like she needs to get some air. She wonders why it would take so long to see Dr. Jaycob Shepherd, why she was to see nephrologist, why she was having no palpitations on Propranolol, and we went over that she had seen neurology and endocrine. Stress echo 06/11/18 - walked 3 minutes, EFT 60-65%, submaximal stress to 82% of predicted heart rate. Assessment/Plan:     1. POTS- orthostasis with near syncope but a wide variety of other complaints including tachycardia, palpitations, dizziness, headaches. She is not taking in fluids like Gatorade in the morning, nor eating breakfast very early in the morning and she is not wearing the stockings like Dr. Jaja Jean suggested. She had been told to start back on Florinef last visit, but only does it intermittently.   At this point I don't really have much more to offer her than Propranolol. It's POTS syndrome and I think Dr. Verona Wild expertise would be more helpful there, and she's seeing Dr. Kelly Washington long term and that has been requested by Dr. Marlo Azevedo. Dr. Heike Marroquin excellent note is reviewed, indicating that her thyroid is stable. 2. Poor compliance with suggested non medical therapies as above. Strongly urged  to increase fluid intake and salty fluids in the mornings especially since she is more symptomatic in the mornings. 3. Chest pain atypical but recurrent, previous normal echo,had normal  stress echo     4. Her potassium has normalized,      6. Dr. Marlo Azevedo has expressed about sending her to see Dr. Kelly Washington at [facility], I have told Ms. Kathia Monreal that I cannot explain all her symptoms and suspect that she needs a second opinion. Future Appointments  Date Time Provider Department Center   3/5/2019 8:20 AM Patric Baumgarten,  [address]   7/1/2019 10:50 AM Dragan Woods MD [facility] [address] [facility]      Patient Instructions   Canceled follow up scheduled with Dr. Fredy Romero in October. Follow up with Dr. Jose Holden in 6 months. Key CAD CHF Meds             propranolol LA (INDERAL LA) 60 mg SR capsule  (Taking) Take 1 Cap by mouth two (2) times a day. Indications: POTS            Impression:   1. POTS (postural orthostatic tachycardia syndrome)    2. Dizziness    3. Heart palpitations    4. Subclinical hyperthyroidism       Cardiac History:   ECHO 9-13-17 WNL   HOLTER 9-13-17 rare sinus arrhythmia ; 26 PACs, rare PVCs; sinus at  no prolonged tachycardia      ROS-except as noted above. . A complete cardiac and respiratory are reviewed and negative except as above ; Resp-denies wheezing  or productive cough,.  Const- No unusual weight loss or fever; Neuro-no recent seizure or CVA ; GI- No BRBPR, abdom pain, bloating ; - no  hematuria   see supplement sheet, initialed and to be scanned by staff  Past Medical History:   Diagnosis Date    Chlamydia 02/2018    Hypertension  Multiple thyroid nodules     Palpitations     POTS (postural orthostatic tachycardia syndrome)     POTS (postural orthostatic tachycardia syndrome) 2017    Thyroid nodule       Social Hx= reports that she has never smoked. She has never used smokeless tobacco. She reports that she does not drink alcohol or use illicit drugs. Exam and Labs:  /80 (BP 1 Location: Left arm, BP Patient Position: Sitting)  Pulse 85  Resp 18  Ht 5' 3\" (1.6 m)  Wt 136 lb (61.7 kg)  SpO2 99%  BMI 24.09 kg/p5Kwsuwxpvkvrhed:  NAD, comfortable  Head: NC,AT. Eyes: No scleral icterus. Neck:  Neck supple. No JVD present. Throat: moist mucous membranes. Chest: Effort normal & normal respiratory excursion . Neurological: alert, conversant and oriented . Skin: Skin is not cold. No obvious systemic rash noted. Not diaphoretic. No erythema. Psychiatric:  Grossly normal mood and affect. Behavior appears normal. Extremities:  no clubbing or cyanosis. Abdomen: non distended    Lungs:breath sounds normal. No stridor. distress, wheezes or  Rales. Heart: normal rate, regular rhythm, normal S1, S2, no murmurs, rubs, clicks or gallops , PMI non displaced. Edema: Edema is none.     Lab Results   Component Value Date/Time    Sodium 140 03/08/2018 03:02 PM    Potassium 4.6 03/08/2018 03:02 PM    Chloride 100 03/08/2018 03:02 PM    CO2 24 03/08/2018 03:02 PM    Anion gap 9 02/12/2018 01:45 PM    Glucose 92 03/08/2018 03:02 PM    BUN 20 03/08/2018 03:02 PM    Creatinine 0.61 03/08/2018 03:02 PM    BUN/Creatinine ratio 33 (H) 03/08/2018 03:02 PM    GFR est AA >60 02/12/2018 01:45 PM    GFR est non-AA >60 02/12/2018 01:45 PM    Calcium 10.1 03/08/2018 03:02 PM      Wt Readings from Last 3 Encounters:   09/04/18 136 lb (61.7 kg)   07/02/18 136 lb 9.6 oz (62 kg)   06/11/18 138 lb (62.6 kg)      BP Readings from Last 3 Encounters:   09/04/18 128/80   07/02/18 127/67   06/11/18 130/80      Current Outpatient Prescriptions   Medication Sig    propranolol LA (INDERAL LA) 60 mg SR capsule Take 1 Cap by mouth two (2) times a day. Indications: POTS (Patient taking differently: Take 60 mg by mouth daily. Indications: POTS)    biotin 1,000 mcg chew Take  by mouth. No current facility-administered medications for this visit. Impression see above.

## 2018-09-04 NOTE — PATIENT INSTRUCTIONS
Canceled follow up scheduled with Dr. Lyndsey Glass in October. Follow up with Dr. Verenice Higuera in 6 months.

## 2018-09-12 LAB
BUN SERPL-MCNC: 14 MG/DL (ref 6–20)
BUN/CREAT SERPL: 21 (ref 9–23)
C PEPTIDE SERPL-MCNC: 1.8 NG/ML (ref 1.1–4.4)
CALCIUM SERPL-MCNC: 9.7 MG/DL (ref 8.7–10.2)
CHLORIDE SERPL-SCNC: 103 MMOL/L (ref 96–106)
CO2 SERPL-SCNC: 24 MMOL/L (ref 20–29)
CREAT SERPL-MCNC: 0.67 MG/DL (ref 0.57–1)
ESTRADIOL SERPL-MCNC: 92.8 PG/ML
ESTRONE SERPL-MCNC: 60 PG/ML
FSH SERPL-ACNC: 5.8 MIU/ML
GLUCOSE SERPL-MCNC: 89 MG/DL (ref 65–99)
IGF-I SERPL-MCNC: 217 NG/ML
INSULIN SERPL-ACNC: 7.7 UIU/ML (ref 2.6–24.9)
LH SERPL-ACNC: 5.6 MIU/ML
METANEPH FREE SERPL-MCNC: <10 PG/ML (ref 0–62)
NORMETANEPHRINE SERPL-MCNC: 23 PG/ML (ref 0–145)
POTASSIUM SERPL-SCNC: 4.5 MMOL/L (ref 3.5–5.2)
PROINSULIN SERPL-SCNC: NORMAL PMOL/L
PROLACTIN SERPL-MCNC: 8.9 NG/ML (ref 4.8–23.3)
SODIUM SERPL-SCNC: 143 MMOL/L (ref 134–144)
T3 SERPL-MCNC: 117 NG/DL (ref 71–180)
T4 FREE SERPL-MCNC: 1.2 NG/DL (ref 0.82–1.77)
TSH SERPL DL<=0.005 MIU/L-ACNC: 0.33 UIU/ML (ref 0.45–4.5)

## 2018-09-14 LAB
BUN SERPL-MCNC: NORMAL MG/DL
C PEPTIDE SERPL-MCNC: NORMAL NG/ML
CALCIUM SERPL-MCNC: NORMAL MG/DL
CHLORIDE SERPL-SCNC: NORMAL MMOL/L
CO2 SERPL-SCNC: NORMAL MMOL/L
CREAT SERPL-MCNC: NORMAL MG/DL
ESTRADIOL SERPL-MCNC: NORMAL PG/ML
ESTRONE SERPL-MCNC: NORMAL PG/ML
FSH SERPL-ACNC: NORMAL M[IU]/ML
GLUCOSE SERPL-MCNC: NORMAL MG/DL
IGF-I SERPL-MCNC: 170 NG/ML
INSULIN SERPL-ACNC: NORMAL UIU/ML
LH SERPL-ACNC: NORMAL MIU/ML
METANEPH FREE SERPL-MCNC: NORMAL PG/ML
NORMETANEPHRINE SERPL-MCNC: NORMAL PG/ML
POTASSIUM SERPL-SCNC: NORMAL MMOL/L
PROINSULIN SERPL-SCNC: NORMAL PMOL/L
PROLACTIN SERPL-MCNC: NORMAL NG/ML
SODIUM SERPL-SCNC: NORMAL MMOL/L
T3 SERPL-MCNC: NORMAL NG/DL
T4 FREE SERPL-MCNC: NORMAL NG/DL
TSH SERPL DL<=0.005 MIU/L-ACNC: NORMAL UIU/ML

## 2018-09-25 ENCOUNTER — TELEPHONE (OUTPATIENT)
Dept: ENDOCRINOLOGY | Age: 38
End: 2018-09-25

## 2018-09-25 NOTE — TELEPHONE ENCOUNTER
Patient called to get her lab results that were done 1 week ago. She can be reached at:  [phone].

## 2018-09-25 NOTE — TELEPHONE ENCOUNTER
Spoke with patient. She had some questions regarding her results. The answers were read to her in her letter. (she wanted to know about her insulin level). She then expressed understanding. Asked her if she has scheduled the ultrasound. She said that she did not know that she needed to call scheduling. I called Deana at scheduling. She states that she will call patient and set up an appointment for the thyroid ultrasound.

## 2018-09-25 NOTE — TELEPHONE ENCOUNTER
I will call her and read the result letter to her. Should she still have the ultrasound performed? What should she do about her symptoms?

## 2018-09-25 NOTE — TELEPHONE ENCOUNTER
Yes, she needs to have the Ultrasound because of her thyroid nodules. We need to monitor to make sure they have not increased in size.

## 2018-12-11 ENCOUNTER — OFFICE VISIT (OUTPATIENT)
Dept: INTERNAL MEDICINE CLINIC | Age: 38
End: 2018-12-11

## 2018-12-11 VITALS
HEIGHT: 63 IN | WEIGHT: 142.5 LBS | SYSTOLIC BLOOD PRESSURE: 132 MMHG | HEART RATE: 78 BPM | BODY MASS INDEX: 25.25 KG/M2 | DIASTOLIC BLOOD PRESSURE: 70 MMHG | RESPIRATION RATE: 16 BRPM | OXYGEN SATURATION: 100 % | TEMPERATURE: 98.2 F

## 2018-12-11 DIAGNOSIS — G90.A POTS (POSTURAL ORTHOSTATIC TACHYCARDIA SYNDROME): ICD-10-CM

## 2018-12-11 DIAGNOSIS — R42 DIZZINESS: ICD-10-CM

## 2018-12-11 DIAGNOSIS — R25.1 OCCASIONAL TREMORS: Primary | ICD-10-CM

## 2018-12-11 RX ORDER — BUSPIRONE HYDROCHLORIDE 5 MG/1
TABLET ORAL
COMMUNITY
End: 2019-12-22

## 2018-12-11 RX ORDER — MECLIZINE HCL 12.5 MG 12.5 MG/1
TABLET ORAL
Refills: 0 | COMMUNITY
Start: 2018-12-08 | End: 2018-12-11 | Stop reason: SDUPTHER

## 2018-12-11 RX ORDER — MECLIZINE HYDROCHLORIDE 25 MG/1
25 TABLET ORAL
Qty: 90 TAB | Refills: 1 | Status: SHIPPED | OUTPATIENT
Start: 2018-12-11 | End: 2019-12-22

## 2018-12-11 NOTE — PATIENT INSTRUCTIONS
1.  Please contact Dr. Tianna Leal for evaluation of new symptoms. Please follow the following instructions to process/authorize your referral, if needed:    Referrals processing  Please verify with your insurance IF you need referral authorization submitted. For insurance plans which require this, please follow the following steps. FAILURE TO DO SO MAY RESULT IN INABILITY TO SEE THE SPECIALIST YOU HAVE BEEN REFERRED TO (once you are scheduled to see them). 1. Call and schedule appointment with specialist  2. Call our clinic and leave message with provider name, and date of appointment  3. We will then submit the referral to your insurance. This process takes 2-5 business days. If you have questions about scheduling or authorizing referral, you can review with our referral coordinators Kanu Osorio. or Tianna Leal) at the . You can review with them today if available/if you have time, or you can call to review with them once you have made your referral/appointment. If you are not sure if you need referral authorizations, please review with the referral coordinators, either prior to or after you have made the appointment, as reviewed. 2.  When you need refill of meclizine, please have pharmacy send here as reviewed. If needing more than once daily, can review with you then with further refills as needed.

## 2018-12-11 NOTE — PROGRESS NOTES
RM 18    Patient request  for speaking with MD.     Patient refused flu vaccine. Request refill for Meclizine. Chief Complaint   Patient presents with    Headache    Dizziness    Palpitations     Patient reports this i normal for her. 1. Have you been to the ER, urgent care clinic since your last visit? Hospitalized since your last visit? No    2. Have you seen or consulted any other health care providers outside of the [address] [place] [address] since your last visit? Include any pap smears or colon screening. No    Health Maintenance Due   Topic Date Due    DTaP/Tdap/Td series (1 - Tdap) 08/08/2000     PHQ over the last two weeks 12/11/2018   Little interest or pleasure in doing things Not at all   Feeling down, depressed, irritable, or hopeless Not at all   Total Score PHQ 2 0     Abuse Screening Questionnaire 12/11/2018   Do you ever feel afraid of your partner? N   Are you in a relationship with someone who physically or mentally threatens you? N   Is it safe for you to go home?  Marcela Aldana

## 2018-12-11 NOTE — PROGRESS NOTES
History of Present Illness:   Marques Puente is a 43 y.o. female here for evaluation:    Speaks [place] History, exam and education/communication with pt via [employer]  #[id number] [place] Chief Complaint   Patient presents with    Headache    Dizziness    Palpitations     Patient reports this i normal for her. Here to review above. POTS:  Last seen by me March 2018. When seen for follow-up with Dr. Vicki Paulson Sept 2018, she was not following their recommendations for mgt POTS. He recommended follow-up with Dr. Carlos Walker in 6mo and this is scheduled for March 2019. She was awaiting appt with Dr. Halle Banuelos for evaluation POTS but not seen yet. Dr. Vicki Paulson recommended she see another provider for her symptoms, and agreed with mgt with Dr. Halle Banuelos. Reviewed notes above with Dr. Vicki Paulson, and with Dr. Holly Saldana, and Dr. Bobbi Dias letter. Endocrine follow-up for thyroid nodules recommended 1yr. She notes has appt with Dr. Halle Banuelos May 2019. This is her first appt with Dr. Halle Banuelos. She notes has not been able to see Dr. Halle Banuelos yet. She was initially referred from here by me April 2018. Reviewed pending eval with Dr. Halle Banuelos, cardiology Dr. Carlos Walker, would be best provider which she could see to manage symptoms if needed in interim. He had recommended increaseing fluid intake and salty fluids in morning based on her symptoms, but she had not reported doing so at visit 9-4-18 with Dr. Liddie Mcburney.    She notes palpitations are well-controlled. She notes waking up in the AM very tired, but also with shaking/tremors. She has last seen neurology March 2018--prior to visit here. She cancelled appt April 2018 with neurology, but seen there June 2018. She was seen with neurology for only HA's. She did not have any eval there for these symptoms. She notes meclizine effective with managing dizziness. Notes with 12.5mg dose, may have to take up to TID.   With prior 25mg dose, one daily dose often effective to manage symptoms. PCP clarification:  She notes seen in interim by Dr. Rashawn Carter (listed today as her PCP)--but no changes there. PCP listed today as:  Daryl Berg MD  [facility]    She notes prefers to maintain PCP here. Prior to Admission medications    Medication Sig Start Date End Date Taking? Authorizing Provider   propranolol LA (INDERAL LA) 60 mg SR capsule Take 1 Cap by mouth two (2) times a day. Indications: POTS  Patient taking differently: Take 60 mg by mouth daily. Indications: POTS 4/19/18  Yes Neal Sherman MD   biotin 1,000 mcg chew Take  by mouth. Yes Provider, Historical   meclizine (ANTIVERT) 12.5 mg tablet TK 1 T PO TID PRF DIZZINESS 12/8/18   Provider, Historical   busPIRone (BUSPAR) 5 mg tablet buspirone 5 mg tablet   TK 1 T PO BID PRN    Provider, Historical        ROS    Vitals:    12/11/18 1142   BP: 132/70   Pulse: 78   Resp: 16   Temp: 98.2 °F (36.8 °C)   TempSrc: Oral   SpO2: 100%   Weight: 142 lb 8 oz (64.6 kg)   Height: 5' 3\" (1.6 m)   PainSc:   0 - No pain   LMP: 11/27/2018      Body mass index is 25.24 kg/m². Physical Exam:     Physical Exam   Constitutional: She appears well-developed and well-nourished. No distress. HENT:   Head: Normocephalic and atraumatic. Eyes: Conjunctivae are normal. Right eye exhibits no discharge. Left eye exhibits no discharge. No scleral icterus. Neck: Normal range of motion. Neck supple. No tracheal deviation present. No thyromegaly present. Cardiovascular: Normal rate, regular rhythm, normal heart sounds and intact distal pulses. Exam reveals no gallop and no friction rub. No murmur heard. Pulmonary/Chest: Effort normal and breath sounds normal. No stridor. No respiratory distress. She has no wheezes. She has no rales. She exhibits no tenderness. Abdominal: She exhibits no distension.    Musculoskeletal: She exhibits no edema or tenderness. Lymphadenopathy:     She has no cervical adenopathy. Neurological: She is alert. She exhibits normal muscle tone. Coordination normal.   Skin: Skin is warm. No rash noted. She is not diaphoretic. No erythema. No pallor. Psychiatric: She has a normal mood and affect. Her behavior is normal. Judgment and thought content normal.       Assessment and Plan:       ICD-10-CM ICD-9-CM    1. Occasional tremors R25.1 781.0 REFERRAL TO NEUROLOGY   2. POTS (postural orthostatic tachycardia syndrome) R00.0 427.89 meclizine (ANTIVERT) 12.5 mg tablet    I95.1  REFERRAL TO NEUROLOGY   3. Dizziness R42 780.4 meclizine (ANTIVERT) 12.5 mg tablet       1. Eval with neurology pending eval with Dr. Douglas Wiseman reviewed. She has seen prior, but for HA mgt, which remains well-controlled on current medications. 2,3:  Meclizine dosing and dose increase reviewed. She has better responses with 25mg dose, as above. Refill reviewed at visit. Follow-up Disposition:  Return in about 6 months (around 6/11/2019), or if symptoms worsen or fail to improve, for referral follow-up.  lab results and schedule of future lab studies reviewed with patient  reviewed diet, exercise and weight control  reviewed medications and side effects in detail    For additional documentation of information and/or recommendations discussed this visit, please see notes in instructions. Plan and evaluation (above) reviewed with pt at visit  Patient voiced understanding of plan and provided with time to ask/review questions. After Visit Summary (AVS) provided to pt after visit with additional instructions as needed/reviewed.

## 2019-03-05 ENCOUNTER — OFFICE VISIT (OUTPATIENT)
Dept: CARDIOLOGY CLINIC | Age: 39
End: 2019-03-05

## 2019-03-05 DIAGNOSIS — G90.A POTS (POSTURAL ORTHOSTATIC TACHYCARDIA SYNDROME): ICD-10-CM

## 2019-03-05 DIAGNOSIS — R42 DIZZINESS: Primary | ICD-10-CM

## 2019-11-08 ENCOUNTER — HOSPITAL ENCOUNTER (EMERGENCY)
Age: 39
Discharge: HOME | End: 2019-11-08
Attending: EMERGENCY MEDICINE
Payer: MEDICAID

## 2019-11-08 ENCOUNTER — APPOINTMENT (OUTPATIENT)
Dept: GENERAL RADIOLOGY | Age: 39
End: 2019-11-08
Attending: EMERGENCY MEDICINE
Payer: MEDICAID

## 2019-11-08 ENCOUNTER — APPOINTMENT (OUTPATIENT)
Dept: CT IMAGING | Age: 39
End: 2019-11-08
Attending: EMERGENCY MEDICINE
Payer: MEDICAID

## 2019-11-08 VITALS
OXYGEN SATURATION: 100 % | TEMPERATURE: 98.3 F | SYSTOLIC BLOOD PRESSURE: 123 MMHG | DIASTOLIC BLOOD PRESSURE: 73 MMHG | HEART RATE: 95 BPM | RESPIRATION RATE: 29 BRPM

## 2019-11-08 DIAGNOSIS — G90.A POTS (POSTURAL ORTHOSTATIC TACHYCARDIA SYNDROME): ICD-10-CM

## 2019-11-08 DIAGNOSIS — R07.9 CHEST PAIN WITH LOW RISK OF ACUTE CORONARY SYNDROME: Primary | ICD-10-CM

## 2019-11-08 LAB
ALBUMIN SERPL-MCNC: 3.6 G/DL (ref 3.5–5)
ALBUMIN/GLOB SERPL: 0.9 {RATIO} (ref 1.1–2.2)
ALP SERPL-CCNC: 73 U/L (ref 45–117)
ALT SERPL-CCNC: 17 U/L (ref 12–78)
ANION GAP SERPL CALC-SCNC: 8 MMOL/L (ref 5–15)
AST SERPL-CCNC: 15 U/L (ref 15–37)
ATRIAL RATE: 96 BPM
BASOPHILS # BLD: 0 K/UL (ref 0–0.1)
BASOPHILS NFR BLD: 0 % (ref 0–1)
BILIRUB SERPL-MCNC: 0.3 MG/DL (ref 0.2–1)
BUN SERPL-MCNC: 13 MG/DL (ref 6–20)
BUN/CREAT SERPL: 17 (ref 12–20)
CALCIUM SERPL-MCNC: 9 MG/DL (ref 8.5–10.1)
CALCULATED P AXIS, ECG09: 56 DEGREES
CALCULATED R AXIS, ECG10: 14 DEGREES
CALCULATED T AXIS, ECG11: 28 DEGREES
CHLORIDE SERPL-SCNC: 107 MMOL/L (ref 97–108)
CO2 SERPL-SCNC: 23 MMOL/L (ref 21–32)
CREAT SERPL-MCNC: 0.77 MG/DL (ref 0.55–1.02)
D DIMER PPP FEU-MCNC: 0.65 MG/L FEU (ref 0–0.65)
DIAGNOSIS, 93000: NORMAL
DIFFERENTIAL METHOD BLD: ABNORMAL
EOSINOPHIL # BLD: 0.3 K/UL (ref 0–0.4)
EOSINOPHIL NFR BLD: 3 % (ref 0–7)
ERYTHROCYTE [DISTWIDTH] IN BLOOD BY AUTOMATED COUNT: 13.1 % (ref 11.5–14.5)
GLOBULIN SER CALC-MCNC: 4.2 G/DL (ref 2–4)
GLUCOSE SERPL-MCNC: 112 MG/DL (ref 65–100)
HCG UR QL: NEGATIVE
HCT VFR BLD AUTO: 44.5 % (ref 35–47)
HGB BLD-MCNC: 15 G/DL (ref 11.5–16)
IMM GRANULOCYTES # BLD AUTO: 0 K/UL (ref 0–0.04)
IMM GRANULOCYTES NFR BLD AUTO: 1 % (ref 0–0.5)
LYMPHOCYTES # BLD: 2.9 K/UL (ref 0.8–3.5)
LYMPHOCYTES NFR BLD: 35 % (ref 12–49)
MCH RBC QN AUTO: 28.9 PG (ref 26–34)
MCHC RBC AUTO-ENTMCNC: 33.7 G/DL (ref 30–36.5)
MCV RBC AUTO: 85.7 FL (ref 80–99)
MONOCYTES # BLD: 0.7 K/UL (ref 0–1)
MONOCYTES NFR BLD: 8 % (ref 5–13)
NEUTS SEG # BLD: 4.4 K/UL (ref 1.8–8)
NEUTS SEG NFR BLD: 53 % (ref 32–75)
NRBC # BLD: 0 K/UL (ref 0–0.01)
NRBC BLD-RTO: 0 PER 100 WBC
P-R INTERVAL, ECG05: 140 MS
PLATELET # BLD AUTO: 420 K/UL (ref 150–400)
PMV BLD AUTO: 9.3 FL (ref 8.9–12.9)
POTASSIUM SERPL-SCNC: 3.6 MMOL/L (ref 3.5–5.1)
PROT SERPL-MCNC: 7.8 G/DL (ref 6.4–8.2)
Q-T INTERVAL, ECG07: 358 MS
QRS DURATION, ECG06: 76 MS
QTC CALCULATION (BEZET), ECG08: 452 MS
RBC # BLD AUTO: 5.19 M/UL (ref 3.8–5.2)
SODIUM SERPL-SCNC: 138 MMOL/L (ref 136–145)
TROPONIN I BLD-MCNC: <0.04 NG/ML (ref 0–0.08)
TROPONIN I BLD-MCNC: <0.04 NG/ML (ref 0–0.08)
VENTRICULAR RATE, ECG03: 96 BPM
WBC # BLD AUTO: 8.2 K/UL (ref 3.6–11)

## 2019-11-08 PROCEDURE — 71046 X-RAY EXAM CHEST 2 VIEWS: CPT

## 2019-11-08 PROCEDURE — 71275 CT ANGIOGRAPHY CHEST: CPT

## 2019-11-08 PROCEDURE — 93005 ELECTROCARDIOGRAM TRACING: CPT

## 2019-11-08 PROCEDURE — 84484 ASSAY OF TROPONIN QUANT: CPT

## 2019-11-08 PROCEDURE — 80053 COMPREHEN METABOLIC PANEL: CPT

## 2019-11-08 PROCEDURE — 74011250636 HC RX REV CODE- 250/636: Performed by: EMERGENCY MEDICINE

## 2019-11-08 PROCEDURE — 81025 URINE PREGNANCY TEST: CPT

## 2019-11-08 PROCEDURE — 99285 EMERGENCY DEPT VISIT HI MDM: CPT

## 2019-11-08 PROCEDURE — 74011636320 HC RX REV CODE- 636/320

## 2019-11-08 PROCEDURE — 85379 FIBRIN DEGRADATION QUANT: CPT

## 2019-11-08 PROCEDURE — 36415 COLL VENOUS BLD VENIPUNCTURE: CPT

## 2019-11-08 PROCEDURE — 85025 COMPLETE CBC W/AUTO DIFF WBC: CPT

## 2019-11-08 RX ORDER — BARIUM SULFATE 20 MG/ML
900 SUSPENSION ORAL
Status: DISCONTINUED | OUTPATIENT
Start: 2019-11-08 | End: 2019-11-08

## 2019-11-08 RX ORDER — SODIUM CHLORIDE 0.9 % (FLUSH) 0.9 %
10 SYRINGE (ML) INJECTION
Status: DISCONTINUED | OUTPATIENT
Start: 2019-11-08 | End: 2019-11-08

## 2019-11-08 RX ORDER — MECLIZINE HCL 12.5 MG 12.5 MG/1
25 TABLET ORAL
Status: COMPLETED | OUTPATIENT
Start: 2019-11-08 | End: 2019-11-08

## 2019-11-08 RX ADMIN — SODIUM CHLORIDE 1000 ML: 900 INJECTION, SOLUTION INTRAVENOUS at 11:25

## 2019-11-08 RX ADMIN — SODIUM CHLORIDE 1000 ML: 900 INJECTION, SOLUTION INTRAVENOUS at 08:30

## 2019-11-08 RX ADMIN — IOPAMIDOL 100 ML: 755 INJECTION, SOLUTION INTRAVENOUS at 10:20

## 2019-11-08 RX ADMIN — MECLIZINE 25 MG: 12.5 TABLET ORAL at 08:29

## 2019-11-08 RX ADMIN — Medication 10 ML: at 09:12

## 2019-11-08 NOTE — ED TRIAGE NOTES
Pt BIB EMS for 8/10 chest pain this morning. Pt awoke with chest pain and experienced lightheadedness when standing out of bed. Pt has a history of POTS. PT assisted to take 4 ASA with EMS without relief of pain. Pt states that it is a pressure and is a 6/10 at this time. Pt placed on monitor and EKG obtained at this time.

## 2019-11-08 NOTE — PROGRESS NOTES
217 [facility] [place]. [place], [address]  Tel.  [phone]  Fax. 100 [facility] 60  [place], [address] [facility]  Tel.  [phone]  Fax. [phone] [address]   Tel.  [phone]  Fax. [phone]         Subjective:      Mallory De Anda is an 44 y.o. female referred for evaluation for a sleep disorder. She complains of snoring associated with periods of not breathing, leg cramp and wakes up not breathing and tongue feels numb. Symptoms began 6 months ago, gradually worsening since that time. She usually can fall asleep in 60 minutes. Family or house members note snoring, periods of not breathing. She denies completely or partially paralyzed while falling asleep or waking up. Mallory De Anda does wake up frequently at night. She is bothered by waking up too early and left unable to get back to sleep. She actually sleeps about 5 hours at night and wakes up about 3 times during the night. She does not work shifts:  . Alice Jamison indicates she does get too little sleep at night. Her bedtime is 2200. She awakens at 0500. She does not take naps. She has the following observed behaviors: Loud snoring, Light snoring, Sleep talking, Pauses in breathing, Head rocking or banging, Twitching of legs or feet; Nightmares. Other remarks: Vivid dreams     Renner Sleepiness Score: 3     No Known Allergies      Current Outpatient Prescriptions:     propranolol LA (INDERAL LA) 60 mg SR capsule, Take 1 Cap by mouth nightly. Indications: POTS, Disp: 30 Cap, Rfl: 5    biotin 1,000 mcg chew, Take  by mouth., Disp: , Rfl:     topiramate (TOPAMAX) 25 mg tablet, Take 1 Tab by mouth two (2) times a day., Disp: 60 Tab, Rfl: 3    ibuprofen (MOTRIN) 200 mg tablet, Take 2 Tabs by mouth every eight (8) hours as needed for Pain., Disp: 30 Tab, Rfl: 0     She  has a past medical history of Hypertension; Multiple thyroid nodules;  Palpitations; and Thyroid nodule. She  has a past surgical history that includes tilt table evaluation (10/27/2017). She family history includes Cancer in her maternal grandfather; Diabetes in her father; Hypertension in her father, maternal grandfather, and maternal grandmother; Kidney Disease in her maternal grandmother and mother; No Known Problems in her brother, paternal grandfather, paternal grandmother, and sister; Thyroid Disease in her cousin. There is no history of Thyroid Cancer. She  reports that she has never smoked. She has never used smokeless tobacco. She reports that she does not drink alcohol or use illicit drugs. Review of Systems:  Constitutional:  No significant weight loss or weight gain  Eyes:  No blurred vision  CVS:  No significant chest pain  Pulm:  significant shortness of breath  GI: significant nausea no vomiting  :  No significant nocturia  Musculoskeletal:  No significant joint pain at night  Skin:  No significant rashes  Neuro:  No significant dizziness   Psych: active mood issues - tired, sleepy - missed work    Sleep Review of Systems: notable for difficulty falling asleep; frequent awakenings at night;  regular dreaming noted; nightmares ; early morning headaches; no memory problems; concentration issues; no history of any automobile or occupational accidents due to daytime drowsiness. Objective:     Visit Vitals    /80    Pulse 86    Ht 5' 3\" (1.6 m)    Wt 133 lb 9.6 oz (60.6 kg)    SpO2 98%    BMI 23.67 kg/m2         General:   Not in acute distress   Eyes:  Anicteric sclerae, no obvious strabismus   Nose:  No obvious nasal septum deviation    Oropharynx:   Class 4 oropharyngeal outlet, thick tongue base, uvula could not be seen due to low-lying soft palate, narrow tonsilo-pharyngeal pilars   Tonsils:   tonsils are not seen due to low-lying soft palate   Neck:   Neck circ.  in \"inches\": 11.5; midline trachea   Chest/Lungs:  Equal lung expansion, clear on auscultation    CVS:  Normal rate, regular rhythm; no JVD   Skin:  Warm to touch; no obvious rashes   Neuro:  No focal deficits ; no obvious tremor    Psych:  Normal affect,  normal countenance;          Assessment:       ICD-10-CM ICD-9-CM    1. JEANNIE (obstructive sleep apnea) G47.33 327.23 POLYSOMNOGRAPHY 1 NIGHT   2. Essential hypertension I10 401.9    3. Heart palpitations R00.2 785.1          Plan:     * The patient currently has a Moderate Risk for having sleep apnea. STOP-BANG score 4.  * Sleep testing was ordered for initial evaluation. * She was provided information on sleep apnea including coresponding risk factors and the importance of proper treatment. * Treatment options if indicated were reviewed today. Patient agrees to a trial of PAP therapy if indicated. * Counseling was provided regarding proper sleep hygiene (including effect of light on sleep) and safe driving. * Patient agrees to telephone [phone]  follow-up by myself or lead sleep technologist shortly after sleep study to review results and plan final management.     (patient has given permission for a message to be left regarding test results and further management if patient cannot be cannot be reached directly). Thank you for allowing us to participate in your patient's medical care. We'll keep you updated on these investigations. Jose Martin MD, FAASM  Electronically signed. 02/01/18      [employer] : [id number]. As per medicine

## 2019-11-08 NOTE — ED PROVIDER NOTES
EMERGENCY DEPARTMENT HISTORY AND PHYSICAL EXAM      Date: 11/8/2019  Patient Name: Lorena Zhou    History of Presenting Illness     Chief Complaint   Patient presents with    Chest Pain       History Provided By: Patient and EMS    HPI: Lorena Zhou, 35 y.o. female  presents to the ED with cc of chest pain. Patient states pressure in the center of her chest started about 20 minutes prior to arrival.  Patient does have a history of POTS. Patient states that she did initially have some shortness of breath but this is improving. She denies any abdominal pain. No nausea vomiting or diarrhea. No fevers or chills today. No cough. She has no swelling or pain in her legs. She does complain of lightheadedness and dizziness today. There are no other complaints, changes, or physical findings at this time. PCP: Chris Duarte MD    No current facility-administered medications on file prior to encounter. Current Outpatient Medications on File Prior to Encounter   Medication Sig Dispense Refill    busPIRone (BUSPAR) 5 mg tablet buspirone 5 mg tablet   TK 1 T PO BID PRN      meclizine (ANTIVERT) 25 mg tablet Take 1 Tab by mouth every eight (8) hours as needed. For dizziness. 90 Tab 1    propranolol LA (INDERAL LA) 60 mg SR capsule Take 1 Cap by mouth two (2) times a day. Indications: POTS (Patient taking differently: Take 60 mg by mouth daily. Indications: POTS) 180 Cap 1    biotin 1,000 mcg chew Take  by mouth.          Past History     Past Medical History:  Past Medical History:   Diagnosis Date    Anemia     Chlamydia 02/2018    Hypertension     Multiple thyroid nodules     Palpitations     POTS (postural orthostatic tachycardia syndrome)     POTS (postural orthostatic tachycardia syndrome) 2017    Thyroid nodule        Past Surgical History:  Past Surgical History:   Procedure Laterality Date    TILT TABLE EVALUATION  10/27/2017            Family History:  Family History   Problem Relation Age of Onset    Kidney Disease Mother         MARCUS    Hypertension Father     Diabetes Father     Kidney Disease Maternal Grandmother     Hypertension Maternal Grandmother     Cancer Maternal Grandfather         sto,acj    Hypertension Maternal Grandfather     No Known Problems Paternal Grandmother     No Known Problems Paternal Grandfather     No Known Problems Sister     No Known Problems Brother     Thyroid Disease Cousin         x4    Thyroid Cancer Neg Hx        Social History:  Social History     Tobacco Use    Smoking status: Never Smoker    Smokeless tobacco: Never Used   Substance Use Topics    Alcohol use: No    Drug use: No       Allergies:  No Known Allergies      Review of Systems   Review of Systems   Constitutional: Negative for chills and fever. HENT: Negative for congestion, ear pain, rhinorrhea, sore throat and trouble swallowing. Eyes: Negative for visual disturbance. Respiratory: Positive for shortness of breath. Negative for cough and chest tightness. Cardiovascular: Positive for chest pain. Negative for palpitations and leg swelling. Gastrointestinal: Negative for abdominal pain, blood in stool, constipation, diarrhea, nausea and vomiting. Genitourinary: Negative for decreased urine volume, difficulty urinating, dysuria and frequency. Musculoskeletal: Negative for back pain and neck pain. Skin: Negative for color change and rash. Neurological: Positive for dizziness and light-headedness. Negative for weakness and headaches. Physical Exam   Physical Exam   Constitutional: She is oriented to person, place, and time. She appears well-developed and well-nourished. She does not appear ill. No distress. HENT:   Mouth/Throat: Oropharynx is clear and moist.   Eyes: Conjunctivae are normal.   Neck: Neck supple. Cardiovascular: Regular rhythm. Tachycardia present.    Pulmonary/Chest: Effort normal and breath sounds normal. No accessory muscle usage. No respiratory distress. She exhibits no tenderness. Abdominal: Soft. She exhibits no distension. There is no tenderness. Lymphadenopathy:     She has no cervical adenopathy. Neurological: She is alert and oriented to person, place, and time. She has normal strength. No cranial nerve deficit or sensory deficit. Skin: Skin is warm and dry. Nursing note and vitals reviewed. Diagnostic Study Results     Labs -     Recent Results (from the past 24 hour(s))   EKG, 12 LEAD, INITIAL    Collection Time: 11/08/19  8:05 AM   Result Value Ref Range    Ventricular Rate 96 BPM    Atrial Rate 96 BPM    P-R Interval 140 ms    QRS Duration 76 ms    Q-T Interval 358 ms    QTC Calculation (Bezet) 452 ms    Calculated P Axis 56 degrees    Calculated R Axis 14 degrees    Calculated T Axis 28 degrees    Diagnosis       Normal sinus rhythm  Normal ECG    Confirmed by Eitan Lugo ([id number]) on 11/8/2019 8:35:23 AM     CBC WITH AUTOMATED DIFF    Collection Time: 11/08/19  8:18 AM   Result Value Ref Range    WBC 8.2 3.6 - 11.0 K/uL    RBC 5.19 3.80 - 5.20 M/uL    HGB 15.0 11.5 - 16.0 g/dL    HCT 44.5 35.0 - 47.0 %    MCV 85.7 80.0 - 99.0 FL    MCH 28.9 26.0 - 34.0 PG    MCHC 33.7 30.0 - 36.5 g/dL    RDW 13.1 11.5 - 14.5 %    PLATELET 394 (H) 191 - 400 K/uL    MPV 9.3 8.9 - 12.9 FL    NRBC 0.0 0  WBC    ABSOLUTE NRBC 0.00 0.00 - 0.01 K/uL    NEUTROPHILS 53 32 - 75 %    LYMPHOCYTES 35 12 - 49 %    MONOCYTES 8 5 - 13 %    EOSINOPHILS 3 0 - 7 %    BASOPHILS 0 0 - 1 %    IMMATURE GRANULOCYTES 1 (H) 0.0 - 0.5 %    ABS. NEUTROPHILS 4.4 1.8 - 8.0 K/UL    ABS. LYMPHOCYTES 2.9 0.8 - 3.5 K/UL    ABS. MONOCYTES 0.7 0.0 - 1.0 K/UL    ABS. EOSINOPHILS 0.3 0.0 - 0.4 K/UL    ABS. BASOPHILS 0.0 0.0 - 0.1 K/UL    ABS. IMM.  GRANS. 0.0 0.00 - 0.04 K/UL    DF AUTOMATED     METABOLIC PANEL, COMPREHENSIVE    Collection Time: 11/08/19  8:18 AM   Result Value Ref Range    Sodium 138 136 - 145 mmol/L Potassium 3.6 3.5 - 5.1 mmol/L    Chloride 107 97 - 108 mmol/L    CO2 23 21 - 32 mmol/L    Anion gap 8 5 - 15 mmol/L    Glucose 112 (H) 65 - 100 mg/dL    BUN 13 6 - 20 MG/DL    Creatinine 0.77 0.55 - 1.02 MG/DL    BUN/Creatinine ratio 17 12 - 20      GFR est AA >60 >60 ml/min/1.73m2    GFR est non-AA >60 >60 ml/min/1.73m2    Calcium 9.0 8.5 - 10.1 MG/DL    Bilirubin, total 0.3 0.2 - 1.0 MG/DL    ALT (SGPT) 17 12 - 78 U/L    AST (SGOT) 15 15 - 37 U/L    Alk. phosphatase 73 45 - 117 U/L    Protein, total 7.8 6.4 - 8.2 g/dL    Albumin 3.6 3.5 - 5.0 g/dL    Globulin 4.2 (H) 2.0 - 4.0 g/dL    A-G Ratio 0.9 (L) 1.1 - 2.2     POC TROPONIN-I    Collection Time: 11/08/19  8:21 AM   Result Value Ref Range    Troponin-I (POC) <0.04 0.00 - 0.08 ng/mL   D DIMER    Collection Time: 11/08/19  8:28 AM   Result Value Ref Range    D-dimer 0.65 0.00 - 0.65 mg/L FEU   HCG URINE, QL. - POC    Collection Time: 11/08/19  9:39 AM   Result Value Ref Range    Pregnancy test,urine (POC) NEGATIVE  NEG     POC TROPONIN-I    Collection Time: 11/08/19 11:21 AM   Result Value Ref Range    Troponin-I (POC) <0.04 0.00 - 0.08 ng/mL       Radiologic Studies -   CTA CHEST W OR W WO CONT   Final Result   IMPRESSION:   1. No evidence of pulmonary embolism. 2.  Mild age-indeterminate compression of the T7 vertebral body. 3.  No evidence of an infectious process in the lungs, of pleural effusion, or   pneumothorax. 4.  Bilateral symmetric thickening of the skin in the axilla, likely infectious   or inflammatory. XR CHEST PA LAT   Final Result   Impression: No acute process. CT Results  (Last 48 hours)               11/08/19 1019  CTA CHEST W OR W WO CONT Final result    Impression:  IMPRESSION:   1. No evidence of pulmonary embolism. 2.  Mild age-indeterminate compression of the T7 vertebral body. 3.  No evidence of an infectious process in the lungs, of pleural effusion, or   pneumothorax.    4.  Bilateral symmetric thickening of the skin in the axilla, likely infectious   or inflammatory. Narrative:  INDICATION:   Chest pain, acute, pulmonary embolism (PE) suspected        EXAMINATION:  CT ANGIOGRAPHY CHEST       COMPARISON:  Chest x-ray earlier the same day       TECHNIQUE:  CT Angiography of the chest was performed following the uneventful   administration of 100 cc Isovue 370.  3D postprocessing was performed. CT dose reduction was achieved through the use of a standardized protocol   tailored for this examination and automatic exposure control for dose   modulation. FINDINGS:   Motion artifact from the level of the mid to upper thorax through the upper   abdomen. .       Detailed evaluation is limited to the proximal segmental level due to motion   artifact. Within this caveat there is no evidence of pulmonary embolism. There   are no enlarged mediastinal or hilar lymph nodes. The heart size is normal.    There is no pericardial effusion. There is no focal air space disease. No pulmonary nodule or mass is seen. There   are no pleural effusions. The aorta is normal in caliber. Mild bilaterally symmetric thickening of the skin in the axilla, likely   infectious or inflammatory. Images of the upper abdomen demonstrates no abnormality. Mild age-indeterminate   compression of the T7 vertebral body. .               CXR Results  (Last 48 hours)               11/08/19 0831  XR CHEST PA LAT Final result    Impression:  Impression: No acute process. Narrative:  Exam:  2 view chest       Indication: Chest pain again this morning. COMPARISON: None       PA and lateral views demonstrate normal heart size. The patient is on a cardiac   monitor. There is no acute process in the lung fields. The osseous structures   reveal a moderate compression deformity of the T7 vertebral body that appears   chronic. Old left clavicle fracture is noted.                    Medical Decision Making   I am the first provider for this patient. I reviewed the vital signs, available nursing notes, past medical history, past surgical history, family history and social history. Vital Signs-Reviewed the patient's vital signs. Patient Vitals for the past 24 hrs:   Temp Pulse Resp BP SpO2   11/08/19 1200  93 19 116/67 97 %   11/08/19 1100  98 12 118/67 98 %   11/08/19 1035  (!) 120 17  98 %   11/08/19 1034    121/69    11/08/19 0930  96 17 126/77 97 %   11/08/19 0834 98.3 °F (36.8 °C) 93 20  100 %   11/08/19 0832    131/69        EKG interpretation: (Preliminary)  Rhythm: normal sinus rhythm; and regular . Rate (approx.): 96; Axis: normal; [place] interval: normal; QRS interval: normal ; ST/T wave: normal.    Records Reviewed: Nursing Notes, Old Medical Records and Ambulance Run Sheet    Provider Notes (Medical Decision Making): This patient with a history of POTS presents with acute onset chest pain while at work. She has a normal-appearing EKG. 2 sets cardiac enzymes were normal.  She is tachycardic and d-dimer was obtained which was slightly elevated and not consistent with patient's age. CTA of the chest was obtained that ruled out pulmonary embolism. Patient's pain may be related to her history of POTS. Will recommend that she follow-up with her cardiologist.    ED Course:   Initial assessment performed. The patients presenting problems have been discussed, and they are in agreement with the care plan formulated and outlined with them. I have encouraged them to ask questions as they arise throughout their visit.          Orders Placed This Encounter    XR CHEST PA LAT    CTA CHEST W OR W WO CONT    CBC WITH AUTOMATED DIFF    METABOLIC PANEL, COMPREHENSIVE    D DIMER    NURSING-MISCELLANEOUS: PO Challenge ONE TIME    POC URINE PREGNANCY TEST    POC TROPONIN-I    HCG URINE, QL. - POC    POC TROPONIN-I    POC TROPONIN-I    POC TROPONIN-I    EKG 12 LEAD INITIAL    sodium chloride 0.9 % bolus infusion 1,000 mL    meclizine (ANTIVERT) tablet 25 mg    DISCONTD: iopamidol (ISOVUE-370) 76 % injection 100 mL    DISCONTD: sodium chloride (NS) flush 10 mL    DISCONTD: barium sulfate (READICAT) 2.1 % (w/v), 2.0 % (w/w) oral suspension 900 mL    iopamidol (ISOVUE-370) 76 % injection    sodium chloride 0.9 % bolus infusion 1,000 mL         Critical Care Time:   0    Disposition:  Discharge  12:27 PM    The patient's emergency department evaluation is now complete. I have reviewed all labs, imaging, and pertinent information. I have discussed all results with the patient and/or family. Based on our evaluation today I do believe that the patient is safe to be discharged home. The patient has been provided with at home instructions that are pertinent to their complaint today, although these may not be specific to the exact diagnosis. I have reviewed the patient's home medications and attempted to reconcile if not already done so by pharmacy or nursing staff. I have discussed all new prescriptions with the patient. The patient has been encouraged to follow-up with primary care doctor and/or specialist, and these have been discussed with the patient. The patient has been advised that they may return to the emergency department if they have any worsening symptoms and or new symptoms that are of concern to them. Verbal discharge instructions may have also been provided to the patient that may not be specifically contained in the written discharge instructions. The patient has been given opportunity to ask questions prior to discharge. PLAN:  1. Current Discharge Medication List        2.    Follow-up Information     Follow up With Specialties Details Why Contact Info    Mary Palomo MD Cardiology Schedule an appointment as soon as possible for a visit   [place] [address]  [address] [facility] [phone]      [facility] EMERGENCY DEPT Emergency Medicine Go to  If symptoms worsen [phone]  [phone]        Return to ED if worse     Diagnosis     Clinical Impression:   1. Chest pain with low risk of acute coronary syndrome    2. POTS (postural orthostatic tachycardia syndrome)            This note will not be viewable in MyChart.

## 2019-11-08 NOTE — ED NOTES
Pt requesting to take home prescription of metoprolol. This RN verrified with MD Reese Bose that this was okay and assisted pt in taking meds. Pt aware of plan to be discharged following IV fluid administration.  Pt assisted to and from bathroom at this time

## 2019-11-28 ENCOUNTER — HOSPITAL ENCOUNTER (EMERGENCY)
Age: 39
Discharge: HOME | End: 2019-11-28
Attending: EMERGENCY MEDICINE | Admitting: EMERGENCY MEDICINE

## 2019-11-28 VITALS
TEMPERATURE: 97.9 F | RESPIRATION RATE: 16 BRPM | SYSTOLIC BLOOD PRESSURE: 122 MMHG | OXYGEN SATURATION: 99 % | DIASTOLIC BLOOD PRESSURE: 71 MMHG | HEART RATE: 85 BPM

## 2019-11-28 DIAGNOSIS — B34.9 VIRAL ILLNESS: Primary | ICD-10-CM

## 2019-11-28 DIAGNOSIS — R19.7 DIARRHEA, UNSPECIFIED TYPE: ICD-10-CM

## 2019-11-28 LAB
ALBUMIN SERPL-MCNC: 4.1 G/DL (ref 3.5–5)
ALBUMIN/GLOB SERPL: 0.9 {RATIO} (ref 1.1–2.2)
ALP SERPL-CCNC: 80 U/L (ref 45–117)
ALT SERPL-CCNC: 24 U/L (ref 12–78)
ANION GAP SERPL CALC-SCNC: 4 MMOL/L (ref 5–15)
APPEARANCE UR: CLEAR
AST SERPL-CCNC: 24 U/L (ref 15–37)
BACTERIA URNS QL MICRO: NEGATIVE /HPF
BASOPHILS # BLD: 0 K/UL (ref 0–0.1)
BASOPHILS NFR BLD: 0 % (ref 0–1)
BILIRUB SERPL-MCNC: 0.3 MG/DL (ref 0.2–1)
BILIRUB UR QL: NEGATIVE
BUN SERPL-MCNC: 6 MG/DL (ref 6–20)
BUN/CREAT SERPL: 9 (ref 12–20)
CALCIUM SERPL-MCNC: 8.8 MG/DL (ref 8.5–10.1)
CHLORIDE SERPL-SCNC: 103 MMOL/L (ref 97–108)
CO2 SERPL-SCNC: 29 MMOL/L (ref 21–32)
COLOR UR: ABNORMAL
COMMENT, HOLDF: NORMAL
CREAT SERPL-MCNC: 0.67 MG/DL (ref 0.55–1.02)
DIFFERENTIAL METHOD BLD: NORMAL
EOSINOPHIL # BLD: 0.1 K/UL (ref 0–0.4)
EOSINOPHIL NFR BLD: 3 % (ref 0–7)
EPITH CASTS URNS QL MICRO: ABNORMAL /LPF
ERYTHROCYTE [DISTWIDTH] IN BLOOD BY AUTOMATED COUNT: 13 % (ref 11.5–14.5)
GLOBULIN SER CALC-MCNC: 4.5 G/DL (ref 2–4)
GLUCOSE SERPL-MCNC: 102 MG/DL (ref 65–100)
GLUCOSE UR STRIP.AUTO-MCNC: NEGATIVE MG/DL
HCG UR QL: NEGATIVE
HCT VFR BLD AUTO: 44.5 % (ref 35–47)
HGB BLD-MCNC: 14.8 G/DL (ref 11.5–16)
HGB UR QL STRIP: ABNORMAL
IMM GRANULOCYTES # BLD AUTO: 0 K/UL (ref 0–0.04)
IMM GRANULOCYTES NFR BLD AUTO: 0 % (ref 0–0.5)
KETONES UR QL STRIP.AUTO: NEGATIVE MG/DL
LEUKOCYTE ESTERASE UR QL STRIP.AUTO: NEGATIVE
LIPASE SERPL-CCNC: 86 U/L (ref 73–393)
LYMPHOCYTES # BLD: 1.4 K/UL (ref 0.8–3.5)
LYMPHOCYTES NFR BLD: 35 % (ref 12–49)
MCH RBC QN AUTO: 29.1 PG (ref 26–34)
MCHC RBC AUTO-ENTMCNC: 33.3 G/DL (ref 30–36.5)
MCV RBC AUTO: 87.6 FL (ref 80–99)
MONOCYTES # BLD: 0.5 K/UL (ref 0–1)
MONOCYTES NFR BLD: 13 % (ref 5–13)
NEUTS SEG # BLD: 2 K/UL (ref 1.8–8)
NEUTS SEG NFR BLD: 49 % (ref 32–75)
NITRITE UR QL STRIP.AUTO: NEGATIVE
NRBC # BLD: 0 K/UL (ref 0–0.01)
NRBC BLD-RTO: 0 PER 100 WBC
PH UR STRIP: 6.5 [PH] (ref 5–8)
PLATELET # BLD AUTO: 380 K/UL (ref 150–400)
PMV BLD AUTO: 9 FL (ref 8.9–12.9)
POTASSIUM SERPL-SCNC: 3.1 MMOL/L (ref 3.5–5.1)
PROT SERPL-MCNC: 8.6 G/DL (ref 6.4–8.2)
PROT UR STRIP-MCNC: NEGATIVE MG/DL
RBC # BLD AUTO: 5.08 M/UL (ref 3.8–5.2)
RBC #/AREA URNS HPF: ABNORMAL /HPF (ref 0–5)
SAMPLES BEING HELD,HOLD: NORMAL
SODIUM SERPL-SCNC: 136 MMOL/L (ref 136–145)
SP GR UR REFRACTOMETRY: <1.005 (ref 1–1.03)
UR CULT HOLD, URHOLD: NORMAL
UROBILINOGEN UR QL STRIP.AUTO: 1 EU/DL (ref 0.2–1)
WBC # BLD AUTO: 4 K/UL (ref 3.6–11)
WBC URNS QL MICRO: ABNORMAL /HPF (ref 0–4)

## 2019-11-28 PROCEDURE — 83690 ASSAY OF LIPASE: CPT

## 2019-11-28 PROCEDURE — 96374 THER/PROPH/DIAG INJ IV PUSH: CPT

## 2019-11-28 PROCEDURE — 74011250636 HC RX REV CODE- 250/636: Performed by: PHYSICIAN ASSISTANT

## 2019-11-28 PROCEDURE — 96375 TX/PRO/DX INJ NEW DRUG ADDON: CPT

## 2019-11-28 PROCEDURE — 85025 COMPLETE CBC W/AUTO DIFF WBC: CPT

## 2019-11-28 PROCEDURE — 81001 URINALYSIS AUTO W/SCOPE: CPT

## 2019-11-28 PROCEDURE — 80053 COMPREHEN METABOLIC PANEL: CPT

## 2019-11-28 PROCEDURE — 99284 EMERGENCY DEPT VISIT MOD MDM: CPT

## 2019-11-28 PROCEDURE — 81025 URINE PREGNANCY TEST: CPT

## 2019-11-28 PROCEDURE — 96361 HYDRATE IV INFUSION ADD-ON: CPT

## 2019-11-28 PROCEDURE — 36415 COLL VENOUS BLD VENIPUNCTURE: CPT

## 2019-11-28 PROCEDURE — 74011250637 HC RX REV CODE- 250/637: Performed by: PHYSICIAN ASSISTANT

## 2019-11-28 RX ORDER — POTASSIUM CHLORIDE 750 MG/1
40 TABLET, FILM COATED, EXTENDED RELEASE ORAL
Status: COMPLETED | OUTPATIENT
Start: 2019-11-28 | End: 2019-11-28

## 2019-11-28 RX ORDER — POTASSIUM CHLORIDE 750 MG/1
20 TABLET, FILM COATED, EXTENDED RELEASE ORAL 2 TIMES DAILY
Qty: 20 TAB | Refills: 0 | Status: SHIPPED | OUTPATIENT
Start: 2019-11-28 | End: 2019-12-03

## 2019-11-28 RX ORDER — MECLIZINE HCL 12.5 MG 12.5 MG/1
25 TABLET ORAL
Status: COMPLETED | OUTPATIENT
Start: 2019-11-28 | End: 2019-11-28

## 2019-11-28 RX ORDER — KETOROLAC TROMETHAMINE 30 MG/ML
15 INJECTION, SOLUTION INTRAMUSCULAR; INTRAVENOUS
Status: COMPLETED | OUTPATIENT
Start: 2019-11-28 | End: 2019-11-28

## 2019-11-28 RX ORDER — ONDANSETRON 2 MG/ML
4 INJECTION INTRAMUSCULAR; INTRAVENOUS
Status: COMPLETED | OUTPATIENT
Start: 2019-11-28 | End: 2019-11-28

## 2019-11-28 RX ADMIN — KETOROLAC TROMETHAMINE 15 MG: 30 INJECTION, SOLUTION INTRAMUSCULAR at 17:01

## 2019-11-28 RX ADMIN — SODIUM CHLORIDE 1000 ML: 900 INJECTION, SOLUTION INTRAVENOUS at 17:02

## 2019-11-28 RX ADMIN — ONDANSETRON 4 MG: 2 INJECTION INTRAMUSCULAR; INTRAVENOUS at 17:01

## 2019-11-28 RX ADMIN — POTASSIUM CHLORIDE 40 MEQ: 750 TABLET, FILM COATED, EXTENDED RELEASE ORAL at 18:44

## 2019-11-28 RX ADMIN — MECLIZINE 25 MG: 12.5 TABLET ORAL at 18:44

## 2019-11-28 NOTE — ED PROVIDER NOTES
35year old female hx POTS, anemia, HTN presenting for diarrhea. Pt reports diarrhea since yesterday, \"all day,\" myalgias, headache, nausea, mild epigastric abdominal pain, Tmax 100.1 last night. No sick contacts. No melena or hematochezia. No urinary symptoms. Pt took Tylenol this morning. LMP: October 23rd. States that she feels like she needs hydration \"for my POTS. \"  No recent antibiotic use, travel, suspect food ingestions. PMHx: as above  Social: no tobacco or alcohol use. The history is provided by the patient. Abdominal Pain    Associated symptoms include diarrhea, nausea, headaches and myalgias. Pertinent negatives include no fever, no vomiting, no dysuria and no chest pain. Diarrhea    Associated symptoms include diarrhea, nausea, headaches and myalgias. Pertinent negatives include no fever, no vomiting, no dysuria and no chest pain.         Past Medical History:   Diagnosis Date    Anemia     Chlamydia 02/2018    Hypertension     Multiple thyroid nodules     Palpitations     POTS (postural orthostatic tachycardia syndrome)     POTS (postural orthostatic tachycardia syndrome) 2017    Thyroid nodule        Past Surgical History:   Procedure Laterality Date    TILT TABLE EVALUATION  10/27/2017              Family History:   Problem Relation Age of Onset    Kidney Disease Mother         MARCUS    Hypertension Father     Diabetes Father     Kidney Disease Maternal Grandmother     Hypertension Maternal Grandmother     Cancer Maternal Grandfather         sto,acj    Hypertension Maternal Grandfather     No Known Problems Paternal Grandmother     No Known Problems Paternal Grandfather     No Known Problems Sister     No Known Problems Brother     Thyroid Disease Cousin         x4    Thyroid Cancer Neg Hx        Social History     Socioeconomic History    Marital status: SINGLE     Spouse name: Not on file    Number of children: Not on file    Years of education: Not on file  Highest education level: Not on file   Occupational History    Not on file   Social Needs    Financial resource strain: Not on file    Food insecurity:     Worry: Not on file     Inability: Not on file    Transportation needs:     Medical: Not on file     Non-medical: Not on file   Tobacco Use    Smoking status: Never Smoker    Smokeless tobacco: Never Used   Substance and Sexual Activity    Alcohol use: No    Drug use: No    Sexual activity: Never   Lifestyle    Physical activity:     Days per week: Not on file     Minutes per session: Not on file    Stress: Not on file   Relationships    Social connections:     Talks on phone: Not on file     Gets together: Not on file     Attends [employer] service: Not on file     Active member of club or organization: Not on file     Attends meetings of clubs or organizations: Not on file     Relationship status: Not on file    Intimate partner violence:     Fear of current or ex partner: Not on file     Emotionally abused: Not on file     Physically abused: Not on file     Forced sexual activity: Not on file   Other Topics Concern    Not on file   Social History Narrative    ** Merged History Encounter **              ALLERGIES: Patient has no known allergies. Review of Systems   Constitutional: Negative for fever. HENT: Negative for facial swelling and trouble swallowing. Respiratory: Negative for shortness of breath. Cardiovascular: Negative for chest pain. Gastrointestinal: Positive for abdominal pain, diarrhea and nausea. Negative for vomiting. Genitourinary: Negative for dysuria. Musculoskeletal: Positive for myalgias. Skin: Negative for wound. Neurological: Positive for headaches. Negative for syncope. All other systems reviewed and are negative.       Vitals:    11/28/19 1613 11/28/19 1849   BP: 129/83 122/71   Pulse: 93 85   Resp: 16 16   Temp: 97.3 °F (36.3 °C) 97.9 °F (36.6 °C)   SpO2: 98% 99%            Physical Exam  Vitals signs and nursing note reviewed. Constitutional:       General: She is not in acute distress. Appearance: She is well-developed. Comments: Pleasant  female   HENT:      Head: Normocephalic and atraumatic. Right Ear: External ear normal.      Left Ear: External ear normal.   Eyes:      General: No scleral icterus. Conjunctiva/sclera: Conjunctivae normal.   Neck:      Musculoskeletal: Neck supple. Trachea: No tracheal deviation. Cardiovascular:      Rate and Rhythm: Normal rate and regular rhythm. Heart sounds: Normal heart sounds. No murmur. No friction rub. No gallop. Pulmonary:      Effort: Pulmonary effort is normal. No respiratory distress. Breath sounds: Normal breath sounds. No stridor. No wheezing. Abdominal:      General: There is no distension. Palpations: Abdomen is soft. Comments: Soft, non-tender   Musculoskeletal: Normal range of motion. Skin:     General: Skin is warm and dry. Neurological:      Mental Status: She is alert and oriented to person, place, and time. Psychiatric:         Behavior: Behavior normal.          MDM  Number of Diagnoses or Management Options  Diarrhea, unspecified type:   Viral illness:   Diagnosis management comments: 71-year-old female history of pots presenting to the ED for 2-day history of nausea, abdominal cramping, diarrhea. T-max 100.1 last night. No melena or hematochezia. Differential diagnosis includes viral illness, colitis, UTI, etc.  Her abdomen is soft, benign, nontender, patient overall appears very well with reassuring vital signs. Will order basic labs, treat symptoms, reassess. Patient improved after fluids in the ED. Potassium slightly low, will replete orally. Overall reassuring labs and vital signs. No fever, leukocytosis, abdominal tenderness or severe pain to necessitate imaging.   No recent risk factors for C. difficile, patient with only 24 hours of diarrhea, will not send stool studies at this time. Encouraged oral hydration at home, return precautions given.        Amount and/or Complexity of Data Reviewed  Clinical lab tests: ordered and reviewed  Discuss the patient with other providers: yes (Dr. Marysol Granda ED attending)           Procedures

## 2019-11-28 NOTE — ED TRIAGE NOTES
Patient started with mid abdominal pain yesterday morning with nausea (no vomiting) and diarrhea all day. Temp of 100.1 yesterday evening. Only 1 episode of \"very liquid\" diarrhea today.  +headache & generalized body aches

## 2019-11-28 NOTE — DISCHARGE INSTRUCTIONS
Patient Education        Diarrea: Instrucciones de cuidado - [ Diarrhea: Care Instructions ]  Instrucciones de cuidado    La diarrea es la evacuación de heces flojas y acuosas. Con frecuencia, la causa exacta es difícil de determinar. A veces, la diarrea es la manera que tiene el cuerpo de eliminar lo que le causó malestar estomacal. Los virus, la intoxicación por alimentos y muchos medicamentos pueden provocar diarrea. Algunas personas tienen diarrea kyle respuesta al estrés emocional, la ansiedad o determinados alimentos. Toshia todos tenemos diarrea de vez en cuando. Por lo general, no es grave y las heces regresarán pronto a la normalidad. Lo importante es que debe reponer los líquidos perdidos para que pueda prevenir la deshidratación. El médico lo cano revisado minuciosamente, ruslan se pueden presentar problemas más tarde. Si nota algún problema o síntomas nuevos, busque tratamiento médico inmediatamente. La atención de seguimiento es buck parte clave de glass tratamiento y seguridad. Asegúrese de hacer y acudir a todas las citas, y llame a glass médico si está teniendo problemas. También es buck buena idea saber los resultados de jayleen exámenes y mantener buck lista de los medicamentos que ezequiel. ¿Cómo puede cuidarse en el hogar? · Esté atento a señales de deshidratación, lo que significa que glass cuerpo cano perdido [facility]. La deshidratación es un problema médico grave y debe tratarse de inmediato. Las señales de la deshidratación son:  ? Aumento de sed y sequedad de la boca y los ojos. ? Sensación de desmayo o aturdimiento. ? Joseph cantidad de [place] de la normal.  · Para prevenir la deshidratación, antoine abundantes líquidos. Elija beber agua y otros líquidos owen sin cafeína hasta que se sienta mejor. Si tiene [facility] [employer], del corazón o del hígado y tiene que Santos's líquidos, hable con glass médico antes de aumentar la cantidad de líquidos que eddie.   · Comience ingiriendo cantidades pequeñas de alimentos suaves al día siguiente, si es que le apetece. ? Pruebe con yogur que tenga cultivos vivos de lactobacilos. (Kristin la etiqueta). ? Evite las comidas muy condimentadas, las frutas, el alcohol y la cafeína hasta 50 horas después de que desaparezcan todos los síntomas. ? Evite los chicles que contengan sorbitol. ? Evite los productos lácteos (excepto el yogur con lactobacilos) mientras tenga diarrea y tee 3 días después de que hayan desaparecido los síntomas. · El médico podría recomendarle que tome medicamentos de venta rey, kyle loperamida (Imodium), si persiste la diarrea después de 6 horas. Kristin y siga todas las instrucciones de la [place]. No use omari medicamento si tiene diarrea sanguinolenta (con charles), fiebre juan u otras señales de enfermedad grave. Llame a glass médico si danny estar teniendo problemas con glass medicamento. ¿Cuándo debe pedir ayuda? Llame al 911 en cualquier momento que considere que necesita atención de [place]. Por ejemplo, llame si:    · Se desmayó (perdió el conocimiento).   · Las heces son de color rojizo o muy sanguinolentas (con charles).    Llame a glass médico ahora mismo o busque atención médica inmediata si:    · Siente mareo o aturdimiento, o siente que se va a desmayar.     · Loli heces son negruzcas y parecidas al alquitrán o tienen rastros de charles.     · Tiene dolor abdominal nuevo o peor.     · Tiene síntomas de deshidratación, tales klye:  ? [place] y ojos secos. ? [place] orina de color oscuro. ? Tener más sed de lo normal.     · Tiene fiebre nueva o más juan.    Preste especial atención a los cambios en glass jaden y asegúrese de comunicarse con glass médico si:    · La diarrea está empeorando.     · Ve pus en la diarrea.     · No está mejorando después de 2 días (48 horas). ¿Dónde puede encontrar más información en inglés? Luan Vivas a http://jareth-femi.info/. Sofia [place] O[address] en la búsqueda para aprender Abdirizak Muñoz de \"Diarrea:  Instrucciones de cuidado - [ Diarrhea: Care Instructions ]. \"  Revisado: 26 junio, 2019  Versión del contenido: 12.2  © 1252-9964 [employer]. Las instrucciones de cuidado fueron adaptadas bajo licencia por [employer] (which disclaims liability or warranty for this information). Si usted tiene [place] afección médica o sobre estas instrucciones, siempre pregunte a glass profesional de jaden. [employer] niega toda garantía o responsabilidad por glass uso de esta información.

## 2019-12-22 ENCOUNTER — APPOINTMENT (OUTPATIENT)
Dept: GENERAL RADIOLOGY | Age: 39
End: 2019-12-22
Attending: EMERGENCY MEDICINE

## 2019-12-22 ENCOUNTER — HOSPITAL ENCOUNTER (EMERGENCY)
Age: 39
Discharge: HOME | End: 2019-12-22
Attending: EMERGENCY MEDICINE

## 2019-12-22 ENCOUNTER — HOSPITAL ENCOUNTER (EMERGENCY)
Age: 39
Discharge: HOME | End: 2019-12-22
Attending: STUDENT IN AN ORGANIZED HEALTH CARE EDUCATION/TRAINING PROGRAM | Admitting: STUDENT IN AN ORGANIZED HEALTH CARE EDUCATION/TRAINING PROGRAM
Payer: MEDICAID

## 2019-12-22 VITALS
RESPIRATION RATE: 17 BRPM | BODY MASS INDEX: 27.66 KG/M2 | HEART RATE: 102 BPM | TEMPERATURE: 98.1 F | WEIGHT: 156.09 LBS | HEIGHT: 63 IN | OXYGEN SATURATION: 99 % | DIASTOLIC BLOOD PRESSURE: 79 MMHG | SYSTOLIC BLOOD PRESSURE: 127 MMHG

## 2019-12-22 VITALS
OXYGEN SATURATION: 98 % | SYSTOLIC BLOOD PRESSURE: 116 MMHG | RESPIRATION RATE: 17 BRPM | TEMPERATURE: 98.5 F | HEART RATE: 81 BPM | DIASTOLIC BLOOD PRESSURE: 86 MMHG

## 2019-12-22 DIAGNOSIS — R42 DIZZINESS: ICD-10-CM

## 2019-12-22 DIAGNOSIS — R06.02 SOB (SHORTNESS OF BREATH): ICD-10-CM

## 2019-12-22 DIAGNOSIS — G90.A POTS (POSTURAL ORTHOSTATIC TACHYCARDIA SYNDROME): ICD-10-CM

## 2019-12-22 DIAGNOSIS — R42 DIZZINESS: Primary | ICD-10-CM

## 2019-12-22 DIAGNOSIS — G90.A POTS (POSTURAL ORTHOSTATIC TACHYCARDIA SYNDROME): Primary | ICD-10-CM

## 2019-12-22 LAB
ALBUMIN SERPL-MCNC: 3.8 G/DL (ref 3.5–5)
ALBUMIN/GLOB SERPL: 1 {RATIO} (ref 1.1–2.2)
ALP SERPL-CCNC: 70 U/L (ref 45–117)
ALT SERPL-CCNC: 21 U/L (ref 12–78)
ANION GAP SERPL CALC-SCNC: 5 MMOL/L (ref 5–15)
AST SERPL-CCNC: 17 U/L (ref 15–37)
BASOPHILS # BLD: 0 K/UL (ref 0–0.1)
BASOPHILS NFR BLD: 0 % (ref 0–1)
BILIRUB SERPL-MCNC: 0.3 MG/DL (ref 0.2–1)
BUN SERPL-MCNC: 11 MG/DL (ref 6–20)
BUN/CREAT SERPL: 13 (ref 12–20)
CALCIUM SERPL-MCNC: 9.1 MG/DL (ref 8.5–10.1)
CHLORIDE SERPL-SCNC: 110 MMOL/L (ref 97–108)
CO2 SERPL-SCNC: 26 MMOL/L (ref 21–32)
COMMENT, HOLDF: NORMAL
CREAT SERPL-MCNC: 0.85 MG/DL (ref 0.55–1.02)
DIFFERENTIAL METHOD BLD: ABNORMAL
EOSINOPHIL # BLD: 0.3 K/UL (ref 0–0.4)
EOSINOPHIL NFR BLD: 4 % (ref 0–7)
ERYTHROCYTE [DISTWIDTH] IN BLOOD BY AUTOMATED COUNT: 13.2 % (ref 11.5–14.5)
GLOBULIN SER CALC-MCNC: 3.9 G/DL (ref 2–4)
GLUCOSE BLD STRIP.AUTO-MCNC: 142 MG/DL (ref 65–100)
GLUCOSE SERPL-MCNC: 106 MG/DL (ref 65–100)
HCT VFR BLD AUTO: 41.8 % (ref 35–47)
HGB BLD-MCNC: 14.1 G/DL (ref 11.5–16)
IMM GRANULOCYTES # BLD AUTO: 0 K/UL (ref 0–0.04)
IMM GRANULOCYTES NFR BLD AUTO: 0 % (ref 0–0.5)
LYMPHOCYTES # BLD: 1.5 K/UL (ref 0.8–3.5)
LYMPHOCYTES NFR BLD: 17 % (ref 12–49)
MCH RBC QN AUTO: 29.1 PG (ref 26–34)
MCHC RBC AUTO-ENTMCNC: 33.7 G/DL (ref 30–36.5)
MCV RBC AUTO: 86.4 FL (ref 80–99)
MONOCYTES # BLD: 0.6 K/UL (ref 0–1)
MONOCYTES NFR BLD: 7 % (ref 5–13)
NEUTS SEG # BLD: 6.4 K/UL (ref 1.8–8)
NEUTS SEG NFR BLD: 72 % (ref 32–75)
NRBC # BLD: 0 K/UL (ref 0–0.01)
NRBC BLD-RTO: 0 PER 100 WBC
PLATELET # BLD AUTO: 460 K/UL (ref 150–400)
PMV BLD AUTO: 9 FL (ref 8.9–12.9)
POTASSIUM SERPL-SCNC: 3.5 MMOL/L (ref 3.5–5.1)
PROT SERPL-MCNC: 7.7 G/DL (ref 6.4–8.2)
RBC # BLD AUTO: 4.84 M/UL (ref 3.8–5.2)
SAMPLES BEING HELD,HOLD: NORMAL
SERVICE CMNT-IMP: ABNORMAL
SODIUM SERPL-SCNC: 141 MMOL/L (ref 136–145)
TROPONIN I SERPL-MCNC: <0.05 NG/ML
WBC # BLD AUTO: 8.9 K/UL (ref 3.6–11)

## 2019-12-22 PROCEDURE — 85025 COMPLETE CBC W/AUTO DIFF WBC: CPT

## 2019-12-22 PROCEDURE — 80053 COMPREHEN METABOLIC PANEL: CPT

## 2019-12-22 PROCEDURE — 99285 EMERGENCY DEPT VISIT HI MDM: CPT

## 2019-12-22 PROCEDURE — 93005 ELECTROCARDIOGRAM TRACING: CPT

## 2019-12-22 PROCEDURE — 71046 X-RAY EXAM CHEST 2 VIEWS: CPT

## 2019-12-22 PROCEDURE — 74011250636 HC RX REV CODE- 250/636: Performed by: STUDENT IN AN ORGANIZED HEALTH CARE EDUCATION/TRAINING PROGRAM

## 2019-12-22 PROCEDURE — 82962 GLUCOSE BLOOD TEST: CPT

## 2019-12-22 PROCEDURE — 74011250636 HC RX REV CODE- 250/636: Performed by: EMERGENCY MEDICINE

## 2019-12-22 PROCEDURE — 84484 ASSAY OF TROPONIN QUANT: CPT

## 2019-12-22 PROCEDURE — 36415 COLL VENOUS BLD VENIPUNCTURE: CPT

## 2019-12-22 RX ORDER — MECLIZINE HYDROCHLORIDE 25 MG/1
25 TABLET ORAL
Qty: 24 TAB | Refills: 0 | Status: SHIPPED | OUTPATIENT
Start: 2019-12-22

## 2019-12-22 RX ORDER — MECLIZINE HCL 12.5 MG 12.5 MG/1
25 TABLET ORAL
Status: COMPLETED | OUTPATIENT
Start: 2019-12-22 | End: 2019-12-22

## 2019-12-22 RX ORDER — PROCHLORPERAZINE EDISYLATE 5 MG/ML
10 INJECTION INTRAMUSCULAR; INTRAVENOUS
Status: DISCONTINUED | OUTPATIENT
Start: 2019-12-22 | End: 2019-12-22 | Stop reason: SDUPTHER

## 2019-12-22 RX ORDER — MECLIZINE HYDROCHLORIDE 25 MG/1
25 TABLET ORAL
Qty: 24 TAB | Refills: 0 | Status: SHIPPED | OUTPATIENT
Start: 2019-12-22 | End: 2019-12-22

## 2019-12-22 RX ORDER — METOPROLOL TARTRATE 25 MG/1
25 TABLET, FILM COATED ORAL 2 TIMES DAILY
COMMUNITY

## 2019-12-22 RX ADMIN — MECLIZINE 25 MG: 12.5 TABLET ORAL at 18:48

## 2019-12-22 RX ADMIN — SODIUM CHLORIDE 1000 ML: 900 INJECTION, SOLUTION INTRAVENOUS at 02:40

## 2019-12-22 RX ADMIN — SODIUM CHLORIDE 1000 ML: 900 INJECTION, SOLUTION INTRAVENOUS at 19:24

## 2019-12-22 RX ADMIN — MECLIZINE 25 MG: 12.5 TABLET ORAL at 03:37

## 2019-12-22 NOTE — DISCHARGE INSTRUCTIONS
Patient Education        Mareos: Estevan Welch - [ Dizziness: Care Instructions ]  Instrucciones de cuidado  Los mareos son [place] sensación de inestabilidad o confusión en la sosa. Son distintos al vértigo, buck sensación de que la habitación gira o de que usted se mueve o . También es distinto del aturdimiento, que es la sensación de que está a punto de desmayarse. Puede resultar difícil conocer la causa de los [place]. Algunas personas se sienten mareadas cuando tienen migrañas. A veces, los episodios gripales pueden hacer que se sienta mareado. Algunas afecciones médicas, kyle los problemas cardíacos o la presión arterial juan, pueden hacer que se sienta mareado. Muchos medicamentos pueden causar mareos, kyle los que se usan para la presión arterial juan, el dolor o la ansiedad. Si es un medicamento el que está causando los síntomas, glass médico podría recomendarle que lo cambie o deje de tomarlo. Si es un problema cardíaco, podría necesitar medicamentos para ayudar a que glass corazón funcione mejor. Si no hay razón aparente para los síntomas, glass médico podría sugerir vigilar y esperar tee un tiempo para marija si los mareos desaparecen por sí solos. La atención de seguimiento es buck parte clave de glass tratamiento y seguridad. Asegúrese de hacer y acudir a todas las citas, y llame a glass médico si está teniendo problemas. También es buck buena idea saber los resultados de jayleen exámenes y mantener buck lista de los medicamentos que ezequiel. ¿Cómo puede cuidarse en el hogar? · Si glass médico le recomienda o receta medicamentos, tómelos exactamente según las indicaciones. Llame a glass médico si danny estar teniendo un problema con glass medicamento. · No conduzca mientras se sienta mareado. · Trate de prevenir las caídas. Algunas medidas que puede ted son:  ? Usar tapetes antideslizantes, agregar agarraderas cerca de la kasey y usar luces nocturnas.   ? Ordenar glass casa de diamond manera que en los senderos no haya nada con lo que se pueda tropezar. ? Avisarles a familiares y [facility] que se ha estado sintiendo [place]. [place] les servirá para saber cómo ayudarle. ¿Cuándo debe pedir ayuda? Llame al 911 en cualquier momento que considere que necesita atención de [place]. Por ejemplo, llame si:    · Se desmayó (perdió el conocimiento).     · Tiene mareos junto con síntomas de un ataque cardíaco. Estos pueden incluir:  ? Dolor de [place], o presión o buck sensación extraña en el pecho.  ? Sudoración. ? Falta de aire. ? Náuseas o vómito. ? Dolor, presión, o buck sensación extraña en la espalda, el sean, la mandíbula o el abdomen superior, o en all o ambos hombros o brazos. ? Aturdimiento o debilidad repentina. ? Un latido cardíaco rápido o irregular.     · Tiene síntomas de un ataque cerebral. Estos pueden incluir:  ? Entumecimiento, hormigueo, debilidad o parálisis repentinos en la chris, el brazo o la pierna, sobre todo si ocurre en un solo lado del cuerpo. ? Cambios súbitos en la vista. ? Problemas repentinos para hablar. ? Confusión súbita o dificultad repentina para comprender frases sencillas. ? Problemas repentinos para caminar o mantener el equilibrio. ? Un dolor de sosa intenso y repentino, distinto a los sahara de sosa anteriores.    Llame a glass médico ahora mismo o busque atención médica inmediata si:    · Se siente mareado y tiene fiebre, dolor de sosa o zumbido en los oídos.     · Tiene nuevas náuseas y vómito o estos aumentan.     · Loli mareos no desaparecen o regresan.    Preste especial atención a los cambios en glass jaden y asegúrese de comunicarse con glass médico si:    · No mejora kyle se esperaba. ¿Dónde puede encontrar más información en inglés? Tobi Loya a http://jareth-femi.info/. Anabel BARAJAS en la búsqueda para aprender más acerca de \"Mareos: Instrucciones de cuidado - [ Dizziness: Care Instructions ]. \"  Revisado: 26 junio, 2019  Versión del contenido: 12.2  © 4827-0916 [employer]. Las instrucciones de cuidado fueron adaptadas bajo licencia por [employer] (which disclaims liability or warranty for this information). Si usted tiene [place] afección médica o sobre estas instrucciones, siempre pregunte a glass profesional de jaden. [email or web address] niega toda garantía o responsabilidad por glass uso de esta información.

## 2019-12-22 NOTE — ED NOTES
Provider reviewed discharge instructions and options with patient and patient verbalized understanding. RN reviewed discharge instructions using teachback method. Patient ambulated to exit without difficulty and in no signs of acute distress. VSS at time of discharge. No complaints, needs, or questions at this time. Patient to call PCP in the morning for follow up.

## 2019-12-22 NOTE — ED PROVIDER NOTES
Patient is a 76-year-old female with a history of pots who presents to the emergency department chief complaint of worsening positional dizziness. She states this feels consistent with her history of pots, but worse over the past 2 days, particularly with standing up suddenly. She endorses slight mild headache bilaterally. Has not taken anything for her symptoms but has been on meclizine in the past.  She denies recent falls or trauma, fever, cough, chest pain, shortness of breath, chalino pain, nausea, vomiting, bleeding, dysuria, vision changes or hearing problems. Patient reports she has seen multiple specialist in the past including ENT and cardiology.            Past Medical History:   Diagnosis Date    Anemia     Chlamydia 02/2018    Hypertension     Multiple thyroid nodules     Palpitations     POTS (postural orthostatic tachycardia syndrome)     POTS (postural orthostatic tachycardia syndrome) 2017    Thyroid nodule        Past Surgical History:   Procedure Laterality Date    TILT TABLE EVALUATION  10/27/2017              Family History:   Problem Relation Age of Onset    Kidney Disease Mother         MARCUS    Hypertension Father     Diabetes Father     Kidney Disease Maternal Grandmother     Hypertension Maternal Grandmother     Cancer Maternal Grandfather         sto,acj    Hypertension Maternal Grandfather     No Known Problems Paternal Grandmother     No Known Problems Paternal Grandfather     No Known Problems Sister     No Known Problems Brother     Thyroid Disease Cousin         x4    Thyroid Cancer Neg Hx        Social History     Socioeconomic History    Marital status: SINGLE     Spouse name: Not on file    Number of children: Not on file    Years of education: Not on file    Highest education level: Not on file   Occupational History    Not on file   Social Needs    Financial resource strain: Not on file    Food insecurity:     Worry: Not on file     Inability: Not on file   [employer] needs:     Medical: Not on file     Non-medical: Not on file   Tobacco Use    Smoking status: Never Smoker    Smokeless tobacco: Never Used   Substance and Sexual Activity    Alcohol use: No    Drug use: No    Sexual activity: Never   Lifestyle    Physical activity:     Days per week: Not on file     Minutes per session: Not on file    Stress: Not on file   Relationships    Social connections:     Talks on phone: Not on file     Gets together: Not on file     Attends [employer]agogue service: Not on file     Active member of club or organization: Not on file     Attends meetings of clubs or organizations: Not on file     Relationship status: Not on file    Intimate partner violence:     Fear of current or ex partner: Not on file     Emotionally abused: Not on file     Physically abused: Not on file     Forced sexual activity: Not on file   Other Topics Concern    Not on file   Social History Narrative    ** Merged History Encounter **              ALLERGIES: Patient has no known allergies. Review of Systems   Constitutional: Negative for fever. HENT: Negative for sore throat. Eyes: Negative for visual disturbance. Respiratory: Negative for cough and shortness of breath. Cardiovascular: Negative for chest pain. Gastrointestinal: Negative for abdominal pain and vomiting. Genitourinary: Negative for dysuria. Musculoskeletal: Negative for back pain. Skin: Negative for rash. Neurological: Positive for dizziness and headaches. Negative for syncope. All other systems reviewed and are negative. Vitals:    12/22/19 0130   BP: 123/81   Pulse: 91   Resp: 16   Temp: 98.5 °F (36.9 °C)   SpO2: 98%            Physical Exam  Constitutional:       General: She is not in acute distress. Appearance: She is well-developed. HENT:      Head: Normocephalic and atraumatic.       Right Ear: Tympanic membrane normal.   Eyes:      Extraocular Movements: Extraocular movements intact. Conjunctiva/sclera: Conjunctivae normal.      Pupils: Pupils are equal, round, and reactive to light. Comments: No nystagmus     Neck:      Musculoskeletal: Normal range of motion and neck supple. Cardiovascular:      Rate and Rhythm: Normal rate and regular rhythm. Heart sounds: Normal heart sounds. Pulmonary:      Effort: Pulmonary effort is normal. No respiratory distress. Breath sounds: Normal breath sounds. Abdominal:      Palpations: Abdomen is soft. Tenderness: There is no tenderness. There is no guarding. Musculoskeletal: Normal range of motion. Skin:     General: Skin is warm and dry. Neurological:      General: No focal deficit present. Mental Status: She is alert and oriented to person, place, and time. Motor: No abnormal muscle tone. MDM       Procedures    EKG interpretation: 1:51  Rhythm: normal sinus rhythm; and regular . Rate (approx.): 94; Axis: normal; Intervals: normal ; ST/T wave: non-specific changes, no STEMI; EKG documented and interpreted by Anna Mathur MD, ED MD.      The patient is resting comfortably and feels better, is alert, talkative, interactive and in no distress. The repeat examination is unremarkable and benign. The patient is neurologically intact, has a normal mental status and is ambulatory in the ED. The history, exam, diagnostic testing (if any) and the patient's current condition do not suggest pathologic dysrhythmias, stroke, sepsis, ACS, severe anemia, dehydration, or severe electrolyte abnormality or other significant pathology that would warrant further testing, continued ED treatment, admission, neurological consultation, or other specialist evaluation at this point. The vital signs have been stable. The patient's condition is stable and appropriate for discharge.  The patient will pursue further outpatient evaluation with the primary care physician or other designated or consulting physician as indicated in the discharge instructions.

## 2019-12-22 NOTE — ED TRIAGE NOTES
Patient arrives ambulatory from home. Patient is mainly Pakistani speaking. Patient reports dizziness and a headache for two days. Pt reports she has POTS and is unsure if it is related. Denies unilateral weakness/numbness. VAN negative.

## 2019-12-23 LAB
ATRIAL RATE: 90 BPM
CALCULATED P AXIS, ECG09: 55 DEGREES
CALCULATED R AXIS, ECG10: 17 DEGREES
CALCULATED T AXIS, ECG11: 21 DEGREES
DIAGNOSIS, 93000: NORMAL
P-R INTERVAL, ECG05: 164 MS
Q-T INTERVAL, ECG07: 352 MS
QRS DURATION, ECG06: 78 MS
QTC CALCULATION (BEZET), ECG08: 430 MS
VENTRICULAR RATE, ECG03: 90 BPM

## 2019-12-23 NOTE — ED NOTES
Assumed care of patient from [address]. Pt sitting in bed with no c/o at this time. Call bell is within reach. Pt updated on plan of care pending re-evaluation by MD. Understanding verbalized.

## 2019-12-23 NOTE — ED NOTES
Reminded patient to keep her arm straight to allow IVF to infuse. Pt verbalized understanding. She requested crackers and juice, both provided.

## 2019-12-23 NOTE — DISCHARGE INSTRUCTIONS
Patient Education        Mareos: Minor Muniz - [ Dizziness: Care Instructions ]  Instrucciones de cuidado  Los mareos son Milagros Lemon sensación de inestabilidad o confusión en la sosa. Son distintos al vértigo, buck sensación de que la habitación gira o de que usted se mueve o . También es distinto del aturdimiento, que es la sensación de que está a punto de desmayarse. Puede resultar difícil conocer la causa de los [place]. Algunas personas se sienten mareadas cuando tienen migrañas. A veces, los episodios gripales pueden hacer que se sienta mareado. Algunas afecciones médicas, kyle los problemas cardíacos o la presión arterial juan, pueden hacer que se sienta mareado. Muchos medicamentos pueden causar mareos, kyle los que se usan para la presión arterial juan, el dolor o la ansiedad. Si es un medicamento el que está causando los síntomas, glass médico podría recomendarle que lo cambie o deje de tomarlo. Si es un problema cardíaco, podría necesitar medicamentos para ayudar a que glass corazón funcione mejor. Si no hay razón aparente para los síntomas, glass médico podría sugerir vigilar y esperar tee un tiempo para marija si los mareos desaparecen por sí solos. La atención de seguimiento es buck parte clave de glass tratamiento y seguridad. Asegúrese de hacer y acudir a todas las citas, y llame a glass médico si está teniendo problemas. También es buck buena idea saber los resultados de jayleen exámenes y mantener buck lista de los medicamentos que ezequiel. ¿Cómo puede cuidarse en el hogar? · Si glass médico le recomienda o receta medicamentos, tómelos exactamente según las indicaciones. Llame a glass médico si danny estar teniendo un problema con glass medicamento. · No conduzca mientras se sienta mareado. · Trate de prevenir las caídas. Algunas medidas que puede ted son:  ? Usar tapetes antideslizantes, agregar agarraderas cerca de la kasey y usar luces nocturnas.   ? Ordenar glass casa de diamond manera que en los senderos no haya nada con lo que se pueda tropezar. ? Avisarles a familiares y [facility] que se ha estado sintiendo [place]. [place] les servirá para saber cómo ayudarle. ¿Cuándo debe pedir ayuda? Llame al 911 en cualquier momento que considere que necesita atención de [place]. Por ejemplo, llame si:    · Se desmayó (perdió el conocimiento).     · Tiene mareos junto con síntomas de un ataque cardíaco. Estos pueden incluir:  ? Dolor de [place], o presión o buck sensación extraña en el pecho.  ? Sudoración. ? Falta de aire. ? Náuseas o vómito. ? Dolor, presión, o buck sensación extraña en la espalda, el sean, la mandíbula o el abdomen superior, o en all o ambos hombros o brazos. ? Aturdimiento o debilidad repentina. ? Un latido cardíaco rápido o irregular.     · Tiene síntomas de un ataque cerebral. Estos pueden incluir:  ? Entumecimiento, hormigueo, debilidad o parálisis repentinos en la chris, el brazo o la pierna, sobre todo si ocurre en un solo lado del cuerpo. ? Cambios súbitos en la vista. ? Problemas repentinos para hablar. ? Confusión súbita o dificultad repentina para comprender frases sencillas. ? Problemas repentinos para caminar o mantener el equilibrio. ? Un dolor de sosa intenso y repentino, distinto a los sahara de sosa anteriores.    Llame a glass médico ahora mismo o busque atención médica inmediata si:    · Se siente mareado y tiene fiebre, dolor de sosa o zumbido en los oídos.     · Tiene nuevas náuseas y vómito o estos aumentan.     · Loli mareos no desaparecen o regresan.    Preste especial atención a los cambios en galss jaden y asegúrese de comunicarse con glass médico si:    · No mejora kyle se esperaba. ¿Dónde puede encontrar más información en inglés? Rosendo Pittman a http://jareth-femi.info/. Olivia Blake M601 en la búsqueda para aprender más acerca de \"Mareos: Instrucciones de cuidado - [ Dizziness: Care Instructions ]. \"  Revisado: 26 junio, 2019  Versión del contenido: 12.2  © 4864-3475 Healthwise, Incorporated. Las instrucciones de cuidado fueron adaptadas bajo licencia por [employer] (which disclaims liability or warranty for this information). Si usted tiene [place] afección médica o sobre estas instrucciones, siempre pregunte a glass profesional de jaden. [facility] niega toda garantía o responsabilidad por glass uso de esta información. Patient Education        Hipotensión ortostática: Instrucciones de cuidado - [ Orthostatic Hypotension: Care Instructions ]  Instrucciones de cuidado    La hipotensión ortostática es buck disminución rápida de la presión arterial. Puede suceder cuando se pone de pie después de estar sentado o recostado. Es posible que tenga sensación de [place], se sienta aturdido o se flory. Cuando buck persona se incorpora o se pone de pie, el cuerpo cambia la manera en que bombea la [place]. [place] puede hacer que el flujo de charles hacia el cerebro disminuya por un tiempo muy breve. Y eso puede hacer que se sienta aturdido. Muchos medicamentos pueden causar [employer], sobre todo en personas [place]. La falta de líquido (deshidratación) o las enfermedades, kyle la diabetes o la enfermedad del corazón, también pueden causarlo. La atención de seguimiento es buck parte clave de glass tratamiento y seguridad. Asegúrese de hacer y acudir a todas las citas, y llame a glass médico si está teniendo problemas. También es buck buena idea saber los resultados de jayleen exámenes y mantener buck lista de los medicamentos que ezequiel. ¿Cómo puede cuidarse en el hogar? · Avísele a glass médico si tiene cualquier problema con los medicamentos. · Si glass médico le receta un medicamento para ayudar a prevenir un problema de baja presión arterial, tómelo exactamente kyle le fue recetado. Llame a glass médico si danny estar teniendo un problema con glass medicamento. · Jeni abundantes líquidos, suficientes para que glass orina sea de color amarillo frank o hugo kyle el agua.  Elvera Connell ted agua y otros líquidos owen sin cafeína. Si tiene buck enfermedad del riñón, del corazón o del hígado y tiene que Santos's líquidos, hable con glass médico antes de aumentar glass consumo. · Limite o evite el consumo de alcohol y de cafeína. · Levántese lentamente de la cama o después de estar sentado tee mucho tiempo. Si está acostado, gire hacia un lado y deslice los pies por el borde de la cama al piso. [place] el cuerpo [employer] y siéntese. Espere un rato antes de ponerse de pie lentamente. Si está mareado o aturdido, siéntese o recuéstese. ¿Cuándo debe pedir ayuda? Llame al 911 en cualquier momento que considere que necesita atención de emergencia. Por ejemplo, llame si:    · Se desmayó (perdió el conocimiento).    Preste especial atención a los cambios en glass jaden y asegúrese de comunicarse con glass médico si:    · No mejora kyle se esperaba. ¿Dónde puede encontrar más información en inglés? Андрей Luis Fernando a http://jareth-femi.info/. Verona Ly A490 en la búsqueda para aprender más acerca de \"Hipotensión ortostática: Instrucciones de cuidado - [ Orthostatic Hypotension: Care Instructions ]. \"  Revisado: 9 abril, 2019  Versión del contenido: 12.2  © 2413-6424 Healthwise, Incorporated. Las instrucciones de cuidado fueron adaptadas bajo licencia por [employer] (which disclaims liability or warranty for this information). Si usted tiene [place] afección médica o sobre estas instrucciones, siempre pregunte a glass profesional de jaden. Healthwise, Incorporated niega toda garantía o responsabilidad por glass uso de esta información. Patient Education        Falta de aire: Instrucciones de cuidado - [ Shortness of Breath: Care Instructions ]  Instrucciones de cuidado  La falta de aire tiene muchas causas. A veces, las afecciones [address] [place] [address], kyle la ansiedad, pueden ocasionar falta de [place]. Algunas personas tienen buck leve falta de aire cuando hacen ejercicio.  Satya Abide dificultades para respirar [employer] ser síntoma de un problema grave, kyle asma, enfermedad de los pulmones, enfisema, problemas en el corazón y neumonía. Si continúa glass falta de aire, es posible que necesite exámenes y tratamiento. Esté alerta a cualquier cambio en la respiración y otros síntomas. La atención de seguimiento es buck parte clave de glass tratamiento y seguridad. Asegúrese de hacer y acudir a todas las citas, y llame a glass médico si está teniendo problemas. También es buck buena idea saber los resultados de jayleen exámenes y mantener buck lista de los medicamentos que ezequiel. ¿Cómo puede cuidarse en el hogar? · No fume ni permita que otros fumen cerca de usted. Si necesita ayuda para dejar de fumar, hable con glass médico [employer] y medicamentos para dejar de fumar. Éstos pueden aumentar jayleen probabilidades de dejar el hábito para siempre. · Descanse y duerma lo suficiente. · De International medicamentos exactamente kyle le fueron recetados. Llame a glass médico si danny que está teniendo un problema con glass medicamento. · Encuentre maneras saludables de [facility]. ? Erma Sailors a diario. ? Duerma lo suficiente. ? Coma con regularidad y shabbir. ¿Cuándo debe pedir ayuda? Llame al 911 en cualquier momento que considere que necesita atención de emergencia. Por ejemplo, llame si:    · Tiene buck grave falta de aire.     · Tiene síntomas de un ataque al corazón. Estos podrían incluir:  ? Ali Mg en el pecho, o buck sensación extraña en el pecho.  ? Sudoración. ? Falta de aire. ? Náuseas o vómito. ? Dolor, presión o buck sensación extraña en la espalda, el sean, la mandíbula, la parte superior del abdomen, o en all o ambos hombros o brazos. ? Aturdimiento o debilidad repentina. ? Latidos cardíacos rápidos o irregulares. Cuando llame al  911, es posible que el operador le diga que mastique 1 aspirina para adultos o 2 a 4 aspirinas de dosis baja. Espere a la ambulancia.  No trate de conducir usted mismo un automóvil.    Llame a glass médico ahora mismo o busque atención médica inmediata si:    · La falta de aire empeora o Georgie Fret a tener respiración sibilante (con silbidos). La respiración sibilante es un indra frida que se hace al respirar.     · Se despierta en la noche sin aire o necesita elevar glass sosa sobre varias almohadas para poder respirar.     · Le falta el aire después de buck actividad suave o cuando está descansando.    Preste especial atención a los cambios en glass jaden y asegúrese de comunicarse con glass médico si:    · No mejora en los siguientes 1 a 2 días. ¿Dónde puede encontrar más información en inglés? Penny Spurling a http://jareth-femi.info/. S[place] S780 en la búsqueda para aprender más acerca de \"Falta de aire: Instrucciones de cuidado - [ Shortness of Breath: Care Instructions ]. \"  Revisado: 9 junio, 2019  Versión del contenido: 12.2  © 5883-6877 Healthwise, Incorporated. Las instrucciones de cuidado fueron adaptadas bajo licencia por [employer] (which disclaims liability or warranty for this information). Si usted tiene [place] afección médica o sobre estas instrucciones, siempre pregunte a glass profesional de jaden. Healthwise, Incorporated niega toda garantía o responsabilidad por glass uso de esta información.

## 2019-12-23 NOTE — ED PROVIDER NOTES
EMERGENCY DEPARTMENT HISTORY AND PHYSICAL EXAM      Date: 12/22/2019  Patient Name: Layla Prince    History of Presenting Illness     Chief Complaint   Patient presents with    Dizziness     Sudden onset dizziness and shortness of breath 30 minutes ago. Hx of POTS. was seen at [facility] yesterday for the same    Abdominal Pain     generalized lower abdominal pain       History Provided By: Patient and EMS    HPI: Layla Prince, 35 y.o. female presents to the ED with cc of dizziness. The patient was seen at [facility] yesterday for a similar complaint. She has a history of pots and states that her symptoms are similar to what she has experienced with that previously. Symptoms worsened over the last 3 days, particularly when standing up suddenly. When she was seen at [facility], she stated that she had a mild headache. She states that she felt short of breath when she arrived in the emergency department, but denies that symptom currently. She denies chest pain, headache, abdominal pain or diarrhea. Two weeks ago, she did have episodes of diarrhea and abdominal pain which have resolved. She denies cough, leg pain or leg edema. She denies heavy periods or blood in her stool. There are no other complaints, changes, or physical findings at this time.     PCP: Unknown, Provider    Current Facility-Administered Medications on File Prior to Encounter   Medication Dose Route Frequency Provider Last Rate Last Dose    [COMPLETED] sodium chloride 0.9 % bolus infusion 1,000 mL  1,000 mL IntraVENous ONCE Ruthy Wheeler MD   Stopped at 12/22/19 0306    [COMPLETED] meclizine (ANTIVERT) tablet 25 mg  25 mg Oral NOW Ruthy Wheeler MD   25 mg at 12/22/19 1248    [DISCONTINUED] prochlorperazine (COMPAZINE) injection 10 mg  10 mg IntraVENous NOW Ruthy Wheeler MD        [DISCONTINUED] prochlorperazine (COMPAZINE) with saline injection 10 mg  10 mg IntraVENous NOW Ruthy Wheeler MD Current Outpatient Medications on File Prior to Encounter   Medication Sig Dispense Refill    metoprolol tartrate (LOPRESSOR) 25 mg tablet Take 25 mg by mouth two (2) times a day.  [DISCONTINUED] meclizine (ANTIVERT) 25 mg tablet Take 1 Tab by mouth every eight (8) hours as needed for Dizziness. For dizziness. 24 Tab 0    [DISCONTINUED] busPIRone (BUSPAR) 5 mg tablet buspirone 5 mg tablet   TK 1 T PO BID PRN      [DISCONTINUED] meclizine (ANTIVERT) 25 mg tablet Take 1 Tab by mouth every eight (8) hours as needed. For dizziness. 90 Tab 1    [DISCONTINUED] propranolol LA (INDERAL LA) 60 mg SR capsule Take 1 Cap by mouth two (2) times a day. Indications: POTS (Patient taking differently: Take 60 mg by mouth daily. Indications: POTS) 180 Cap 1    [DISCONTINUED] biotin 1,000 mcg chew Take  by mouth.          Past History     Past Medical History:  Past Medical History:   Diagnosis Date    Anemia     Anemia 12/22/2019    Chlamydia 02/2018    Hypertension     Multiple thyroid nodules     Palpitations     POTS (postural orthostatic tachycardia syndrome)     POTS (postural orthostatic tachycardia syndrome) 2017    Thyroid nodule     Vertigo 12/22/2019       Past Surgical History:  Past Surgical History:   Procedure Laterality Date    TILT TABLE EVALUATION  10/27/2017            Family History:  Family History   Problem Relation Age of Onset    Kidney Disease Mother         MARCUS    Hypertension Father     Diabetes Father     Kidney Disease Maternal Grandmother     Hypertension Maternal Grandmother     Cancer Maternal Grandfather         sto,acj    Hypertension Maternal Grandfather     No Known Problems Paternal Grandmother     No Known Problems Paternal Grandfather     No Known Problems Sister     No Known Problems Brother     Thyroid Disease Cousin         x4    Thyroid Cancer Neg Hx        Social History:  Social History     Tobacco Use    Smoking status: Never Smoker    Smokeless tobacco: Never Used   Substance Use Topics    Alcohol use: No    Drug use: No       Allergies:  No Known Allergies      Review of Systems   Review of Systems   Constitutional: Negative for fever. HENT: Negative for congestion. Eyes: Negative. Respiratory: Positive for shortness of breath. Cardiovascular: Negative for chest pain. Gastrointestinal: Negative for abdominal pain. Endocrine: Negative for heat intolerance. Genitourinary: Negative. Musculoskeletal: Negative for back pain. Skin: Negative for rash. Allergic/Immunologic: Negative for immunocompromised state. Neurological: Positive for dizziness and light-headedness. Negative for numbness. Hematological: Does not bruise/bleed easily. Psychiatric/Behavioral: Negative. All other systems reviewed and are negative. Physical Exam   Physical Exam  Vitals signs and nursing note reviewed. Constitutional:       General: She is not in acute distress. Appearance: She is well-developed. HENT:      Head: Normocephalic. Neck:      Musculoskeletal: Normal range of motion and neck supple. Cardiovascular:      Rate and Rhythm: Normal rate and regular rhythm. Heart sounds: Normal heart sounds. Pulmonary:      Effort: Pulmonary effort is normal.      Breath sounds: Normal breath sounds. Abdominal:      General: Bowel sounds are normal.      Palpations: Abdomen is soft. Tenderness: There is no tenderness. Musculoskeletal: Normal range of motion. Skin:     General: Skin is warm and dry. Neurological:      General: No focal deficit present. Mental Status: She is alert and oriented to person, place, and time.    Psychiatric:         Mood and Affect: Mood normal.         Behavior: Behavior normal.         Diagnostic Study Results     Labs -     Recent Results (from the past 12 hour(s))   CBC WITH AUTOMATED DIFF    Collection Time: 12/22/19  4:26 PM   Result Value Ref Range    WBC 8.9 3.6 - 11.0 K/uL    RBC 4. 84 3.80 - 5.20 M/uL    HGB 14.1 11.5 - 16.0 g/dL    HCT 41.8 35.0 - 47.0 %    MCV 86.4 80.0 - 99.0 FL    MCH 29.1 26.0 - 34.0 PG    MCHC 33.7 30.0 - 36.5 g/dL    RDW 13.2 11.5 - 14.5 %    PLATELET 430 (H) 046 - 400 K/uL    MPV 9.0 8.9 - 12.9 FL    NRBC 0.0 0  WBC    ABSOLUTE NRBC 0.00 0.00 - 0.01 K/uL    NEUTROPHILS 72 32 - 75 %    LYMPHOCYTES 17 12 - 49 %    MONOCYTES 7 5 - 13 %    EOSINOPHILS 4 0 - 7 %    BASOPHILS 0 0 - 1 %    IMMATURE GRANULOCYTES 0 0.0 - 0.5 %    ABS. NEUTROPHILS 6.4 1.8 - 8.0 K/UL    ABS. LYMPHOCYTES 1.5 0.8 - 3.5 K/UL    ABS. MONOCYTES 0.6 0.0 - 1.0 K/UL    ABS. EOSINOPHILS 0.3 0.0 - 0.4 K/UL    ABS. BASOPHILS 0.0 0.0 - 0.1 K/UL    ABS. IMM. GRANS. 0.0 0.00 - 0.04 K/UL    DF AUTOMATED     METABOLIC PANEL, COMPREHENSIVE    Collection Time: 12/22/19  4:26 PM   Result Value Ref Range    Sodium 141 136 - 145 mmol/L    Potassium 3.5 3.5 - 5.1 mmol/L    Chloride 110 (H) 97 - 108 mmol/L    CO2 26 21 - 32 mmol/L    Anion gap 5 5 - 15 mmol/L    Glucose 106 (H) 65 - 100 mg/dL    BUN 11 6 - 20 MG/DL    Creatinine 0.85 0.55 - 1.02 MG/DL    BUN/Creatinine ratio 13 12 - 20      GFR est AA >60 >60 ml/min/1.73m2    GFR est non-AA >60 >60 ml/min/1.73m2    Calcium 9.1 8.5 - 10.1 MG/DL    Bilirubin, total 0.3 0.2 - 1.0 MG/DL    ALT (SGPT) 21 12 - 78 U/L    AST (SGOT) 17 15 - 37 U/L    Alk. phosphatase 70 45 - 117 U/L    Protein, total 7.7 6.4 - 8.2 g/dL    Albumin 3.8 3.5 - 5.0 g/dL    Globulin 3.9 2.0 - 4.0 g/dL    A-G Ratio 1.0 (L) 1.1 - 2.2     TROPONIN I    Collection Time: 12/22/19  4:26 PM   Result Value Ref Range    Troponin-I, Qt. <0.05 <0.05 ng/mL       Radiologic Studies -   XR CHEST PA LAT   Final Result   Impression:   No acute cardiopulmonary process. CT Results  (Last 48 hours)    None        CXR Results  (Last 48 hours)               12/22/19 1716  XR CHEST PA LAT Final result    Impression:  Impression:   No acute cardiopulmonary process.        Narrative:  Chest PA and lateral History: Dizziness. Cough. Comparison: 11/8/2019       Findings: The lungs are well expanded. No focal consolidation, pleural   effusion, or pneumothorax. The cardiomediastinal silhouette is unremarkable. There is a chronic moderate compression deformity in the midthoracic spine. Medical Decision Making   I am the first provider for this patient. I reviewed the vital signs, available nursing notes, past medical history, past surgical history, family history and social history. Vital Signs-Reviewed the patient's vital signs. Patient Vitals for the past 12 hrs:   Temp Pulse Resp BP SpO2   12/22/19 1846  (!) 102 17 127/79    12/22/19 1845  84 17 129/74    12/22/19 1844  92 17 130/76 99 %   12/22/19 1613 98.1 °F (36.7 °C) 92 15 135/79 100 %       EKG interpretation: (Preliminary)  Rhythm: normal sinus rhythm; and regular . Rate (approx.): 90; Axis: normal; IL interval: normal; QRS interval: normal ; ST/T wave: normal; Other findings: normal.    Records Reviewed: Nursing Notes, Old Medical Records, Previous electrocardiograms, Ambulance Run Sheet, Previous Radiology Studies and Previous Laboratory Studies    Provider Notes (Medical Decision Making):   Pots syndrome, dehydration, orthostatic, anemia, electrolyte abnormality    ED Course:   Initial assessment performed. The patients presenting problems have been discussed, and they are in agreement with the care plan formulated and outlined with them. I have encouraged them to ask questions as they arise throughout their visit. Progress note: The patient's results were reviewed. The patient is feeling better. She is advised to follow-up and return to ER if worse         Critical Care Time:   0    Disposition:  home    PLAN:  1.    Current Discharge Medication List      CONTINUE these medications which have CHANGED    Details   meclizine (ANTIVERT) 25 mg tablet Take 1 Tab by mouth every eight (8) hours as needed for Dizziness. For dizziness. Qty: 24 Tab, Refills: 0    Associated Diagnoses: POTS (postural orthostatic tachycardia syndrome); Dizziness           2. Follow-up Information     Follow up With Specialties Details Why Contact Info    Your doctor  In 2 days As needed     [facility] EMERGENCY DEPT Emergency Medicine  If symptoms worsen [address]  [address] [facility]  [phone]        Return to ED if worse     Diagnosis     Clinical Impression:   1. POTS (postural orthostatic tachycardia syndrome)    2. Dizziness    3. SOB (shortness of breath)        Attestations:    Anthony Arreguin MD    Please note that this dictation was completed with [employer], the [employer] voice recognition software. Quite often unanticipated grammatical, syntax, homophones, and other interpretive errors are inadvertently transcribed by the computer software. Please disregard these errors. Please excuse any errors that have escaped final proofreading. Thank you.

## 2019-12-23 NOTE — ED NOTES
Patient (s)  given copy of dc instructions and 1 script(s). Patient (s)  verbalized understanding of instructions and script (s). Patient given a current medication reconciliation form and verbalized understanding of their medications. Patient (s) verbalized understanding of the importance of discussing medications with  his or her physician or clinic they will be following up with. Patient alert and oriented and in no acute distress. Patient discharged home ambulatory with all belongings and family.

## 2019-12-23 NOTE — ED NOTES
Bedside shift change report given to Rubi De Souza (oncoming nurse) by Daryl Bernal RN (offgoing nurse). Report included the following information SBAR, Kardex, ED Summary and MAR.

## 2022-04-08 NOTE — PATIENT INSTRUCTIONS
RFCA in progress.    There were changes made to your medications at this appointment.  Please note the following:  START Ibuprofen 200 mg, 2 tab 3X a day as needed for chest pain  Take for 3-5 days, if Chest pain continues please follow up with primary care    Please get a chest Xray

## 2022-08-22 NOTE — ROUTINE PROCESS
1526:  TRANSFER - IN REPORT:    Verbal report received from ELAINA Ricketts RN on Love Allen , from the Cardiac Cath lab, for routine progression of care. Report consisted of patients Situation, Background, Assessment and Recommendations(SBAR). Information from the following report(s) Procedure Summary, Intake/Output, MAR and Recent Results was reviewed with the receiving clinician. Opportunity for questions and clarification was provided. Assessment completed upon patients arrival to [address] [place] [address] and care assumed. Cardiac Cath Lab Recovery Arrival Note:     Love Allen arrived to [facility] recovery area. Patient procedure= Tilt table study. Patient on cardiac monitor, non-invasive blood pressure, Patient status doing well without problems. Patient is A&Ox 4. Patient reports no complaints. Advancement Flap (Single) Text: The defect edges were debeveled with a #15 scalpel blade.  Given the location of the defect and the proximity to free margins a single advancement flap was deemed most appropriate.  Using a sterile surgical marker, an appropriate advancement flap was drawn incorporating the defect and placing the expected incisions within the relaxed skin tension lines where possible.    The area thus outlined was incised deep to adipose tissue with a #15 scalpel blade.  The skin margins were undermined to an appropriate distance in all directions utilizing iris scissors.

## 2022-09-21 NOTE — PATIENT INSTRUCTIONS
The patient states that she submitted the documents, but hasn't heard about the status of Farxiga. Are there any updates? Thank you!   Your appointment with Dr. Mao Lucero has been moved to 3-13-18 @ 140 PM.    There were changes made to your medications at this appointment.  Please note the following:  INCREASE propranolol LA ( Inderal LA) to twice a day

## 2023-06-02 NOTE — PROGRESS NOTES
Stress echo completed. [facility] - [phone]  

## 2024-10-14 NOTE — PATIENT INSTRUCTIONS
"You have received 975 mg of Acetaminophen (Tylenol) at 0730. Please do not take an additional dose of Tylenol until after 1:30pm.     Do not exceed 4,000 mg of acetaminophen during a 24 hour period and keep in mind that acetaminophen can also be found in many over-the-counter cold medications as well as narcotics that may be given for pain.     You received a medication called Toradol (a stronger IV ibuprofen) at 8:50 AM. Do NOT take any Ibuprofen / Advil / Aleve / Naproxen or products containing Ibuprofen until 2:50 PM or later.     Post-Operative Symptom Control    While you recover from your procedure, you can take steps to ease your recovery.    Diet and Fluids:    Eating your normal diet is fine.  Drink a little more than normal but you don not have to \"flush\" your system.    Activity:    There are no activity restrictions after stone surgery.  Some people find bending twisting movements cause discomfort or increased blood in urine.  Activity may irritating but you will not hurt yourself.    Call the Clinic Immediately If You Have Any Of The Following Symptoms:   Nausea/vomiting that is uncontrolled with medications   You have a fever over 100.0   Chills   Are not able to urinate for 8 hours    Increasing back pain that is not relieved with pain medications   Large amounts of blood in urine or large clots    We can respond to your questions or concerns 24 hours a day at [phone].   For after hours phone calls please wait on call to be connected with the \"care connection\" service for after hours care.    Follow up:  We will have you follow-up in clinic in 2 to 3 months after kidney renal ultrasound and KUB.     Please complete your lab testing and 24 hr urine testing around the same time, at least 2 weeks prior to your follow up appointment.   " A Healthy Lifestyle: Care Instructions  Your Care Instructions    A healthy lifestyle can help you feel good, stay at a healthy weight, and have plenty of energy for both work and play. A healthy lifestyle is something you can share with your whole family. A healthy lifestyle also can lower your risk for serious health problems, such as high blood pressure, heart disease, and diabetes. You can follow a few steps listed below to improve your health and the health of your family. Follow-up care is a key part of your treatment and safety. Be sure to make and go to all appointments, and call your doctor if you are having problems. It's also a good idea to know your test results and keep a list of the medicines you take. How can you care for yourself at home? · Do not eat too much sugar, fat, or fast foods. You can still have dessert and treats now and then. The goal is moderation. · Start small to improve your eating habits. Pay attention to portion sizes, drink less juice and soda pop, and eat more fruits and vegetables. ¨ Eat a healthy amount of food. A 3-ounce serving of meat, for example, is about the size of a deck of cards. Fill the rest of your plate with vegetables and whole grains. ¨ Limit the amount of soda and sports drinks you have every day. Drink more water when you are thirsty. ¨ Eat at least 5 servings of fruits and vegetables every day. It may seem like a lot, but it is not hard to reach this goal. A serving or helping is 1 piece of fruit, 1 cup of vegetables, or 2 cups of leafy, raw vegetables. Have an apple or some carrot sticks as an afternoon snack instead of a candy bar. Try to have fruits and/or vegetables at every meal.  · Make exercise part of your daily routine. You may want to start with simple activities, such as walking, bicycling, or slow swimming. Try to be active 30 to 60 minutes every day. You do not need to do all 30 to 60 minutes all at once.  For example, you can exercise 3 times a day for 10 or 20 minutes. Moderate exercise is safe for most people, but it is always a good idea to talk to your doctor before starting an exercise program.  · Keep moving. Kamryn Puff the lawn, work in the garden, or Swing[employer]ot. Take the stairs instead of the elevator at work. · If you smoke, quit. People who smoke have an increased risk for heart attack, stroke, cancer, and other lung illnesses. Quitting is hard, but there are ways to boost your chance of quitting tobacco for good. ¨ Use nicotine gum, patches, or lozenges. ¨ Ask your doctor about stop-smoking programs and medicines. ¨ Keep trying. In addition to reducing your risk of diseases in the future, you will notice some benefits soon after you stop using tobacco. If you have shortness of breath or asthma symptoms, they will likely get better within a few weeks after you quit. · Limit how much alcohol you drink. Moderate amounts of alcohol (up to 2 drinks a day for men, 1 drink a day for women) are okay. But drinking too much can lead to liver problems, high blood pressure, and other health problems. Family health  If you have a family, there are many things you can do together to improve your health. · Eat meals together as a family as often as possible. · Eat healthy foods. This includes fruits, vegetables, lean meats and dairy, and whole grains. · Include your family in your fitness plan. Most people think of activities such as jogging or tennis as the way to fitness, but there are many ways you and your family can be more active. Anything that makes you breathe hard and gets your heart pumping is exercise. Here are some tips:  ¨ Walk to do errands or to take your child to school or the bus. ¨ Go for a family bike ride after dinner instead of watching TV. Where can you learn more? Go to http://jareth-femi.info/. Enter D093 in the search box to learn more about \"A Healthy Lifestyle: Care Instructions. \"  Current as of: May 12, 2017  Content Version: 11.4  © 9601-3602 [employer]. Care instructions adapted under license by [employer] (which disclaims liability or warranty for this information). If you have questions about a medical condition or this instruction, always ask your healthcare professional. [address] any warranty or liability for your use of this information.